# Patient Record
Sex: FEMALE | Race: BLACK OR AFRICAN AMERICAN | NOT HISPANIC OR LATINO | Employment: OTHER | URBAN - METROPOLITAN AREA
[De-identification: names, ages, dates, MRNs, and addresses within clinical notes are randomized per-mention and may not be internally consistent; named-entity substitution may affect disease eponyms.]

---

## 2017-04-01 ENCOUNTER — HISTORICAL (OUTPATIENT)
Dept: LAB | Facility: HOSPITAL | Age: 67
End: 2017-04-01

## 2017-07-03 ENCOUNTER — HISTORICAL (OUTPATIENT)
Dept: RADIOLOGY | Facility: HOSPITAL | Age: 67
End: 2017-07-03

## 2017-09-30 ENCOUNTER — HISTORICAL (OUTPATIENT)
Dept: LAB | Facility: HOSPITAL | Age: 67
End: 2017-09-30

## 2017-09-30 LAB
BUN SERPL-MCNC: 22 MG/DL (ref 7–18)
CALCIUM SERPL-MCNC: 9.8 MG/DL (ref 8.5–10.1)
CHLORIDE SERPL-SCNC: 104 MMOL/L (ref 98–107)
CO2 SERPL-SCNC: 30.2 MMOL/L (ref 21–32)
CREAT SERPL-MCNC: 1.04 MG/DL (ref 0.6–1.3)
EST. AVERAGE GLUCOSE BLD GHB EST-MCNC: 177 MG/DL
GLUCOSE SERPL-MCNC: 160 MG/DL (ref 74–106)
HBA1C MFR BLD: 7.8 % (ref 4.5–6.2)
POTASSIUM SERPL-SCNC: 4.1 MMOL/L (ref 3.5–5.1)
SODIUM SERPL-SCNC: 141 MMOL/L (ref 136–145)

## 2018-01-31 ENCOUNTER — HISTORICAL (OUTPATIENT)
Dept: LAB | Facility: HOSPITAL | Age: 68
End: 2018-01-31

## 2018-01-31 LAB
ALBUMIN SERPL-MCNC: 3.8 GM/DL (ref 3.4–5)
ALP SERPL-CCNC: 63 UNIT/L (ref 46–116)
ALT SERPL-CCNC: 24 UNIT/L (ref 12–78)
AST SERPL-CCNC: 13 UNIT/L (ref 15–37)
BILIRUB SERPL-MCNC: 0.7 MG/DL (ref 0.2–1)
BILIRUBIN DIRECT+TOT PNL SERPL-MCNC: 0.18 MG/DL (ref 0–0.2)
BILIRUBIN DIRECT+TOT PNL SERPL-MCNC: 0.53 MG/DL (ref 0–0.8)
BUN SERPL-MCNC: 12.1 MG/DL (ref 7–18)
CALCIUM SERPL-MCNC: 10 MG/DL (ref 8.5–10.1)
CHLORIDE SERPL-SCNC: 102 MMOL/L (ref 98–107)
CHOLEST SERPL-MCNC: 197 MG/DL (ref 0–200)
CHOLEST/HDLC SERPL: 3.2 {RATIO} (ref 0–4)
CO2 SERPL-SCNC: 32.9 MMOL/L (ref 21–32)
CREAT SERPL-MCNC: 0.84 MG/DL (ref 0.6–1.3)
ERYTHROCYTE [DISTWIDTH] IN BLOOD BY AUTOMATED COUNT: 14.6 % (ref 11.5–17)
EST. AVERAGE GLUCOSE BLD GHB EST-MCNC: 169 MG/DL
FT4I SERPL CALC-MCNC: 2.28
GLUCOSE SERPL-MCNC: 161 MG/DL (ref 74–106)
HBA1C MFR BLD: 7.5 % (ref 4.5–6.2)
HCT VFR BLD AUTO: 40 % (ref 37–47)
HDLC SERPL-MCNC: 61 MG/DL (ref 40–60)
HGB BLD-MCNC: 12.7 GM/DL (ref 12–16)
LDLC SERPL CALC-MCNC: 113 MG/DL (ref 0–129)
MCH RBC QN AUTO: 28.9 PG (ref 27–31)
MCHC RBC AUTO-ENTMCNC: 31.8 GM/DL (ref 33–36)
MCV RBC AUTO: 91.1 FL (ref 80–94)
PLATELET # BLD AUTO: 187 X10(3)/MCL (ref 130–400)
PMV BLD AUTO: 9.9 FL (ref 7.4–10.4)
POTASSIUM SERPL-SCNC: 4.1 MMOL/L (ref 3.5–5.1)
PROT SERPL-MCNC: 7.7 GM/DL (ref 6.4–8.2)
RBC # BLD AUTO: 4.39 X10(6)/MCL (ref 4.2–5.4)
SODIUM SERPL-SCNC: 140 MMOL/L (ref 136–145)
T3RU NFR SERPL: 35 % (ref 31–39)
T4 SERPL-MCNC: 6.5 MCG/DL (ref 4.7–13.3)
TRIGL SERPL-MCNC: 113 MG/DL
TSH SERPL-ACNC: 1.42 MIU/ML (ref 0.36–3.74)
VLDLC SERPL CALC-MCNC: 23 MG/DL
WBC # SPEC AUTO: 5.1 X10(3)/MCL (ref 4.5–11.5)

## 2018-05-14 ENCOUNTER — HISTORICAL (OUTPATIENT)
Dept: RADIOLOGY | Facility: HOSPITAL | Age: 68
End: 2018-05-14

## 2018-05-18 ENCOUNTER — HISTORICAL (OUTPATIENT)
Dept: LAB | Facility: HOSPITAL | Age: 68
End: 2018-05-18

## 2018-05-18 LAB
ABS NEUT (OLG): 2.3 X10(3)/MCL (ref 2.1–9.2)
ALBUMIN SERPL-MCNC: 3.5 GM/DL (ref 3.4–5)
ALP SERPL-CCNC: 112 UNIT/L (ref 46–116)
ALT SERPL-CCNC: 108 UNIT/L (ref 12–78)
AST SERPL-CCNC: 66 UNIT/L (ref 15–37)
BASOPHILS NFR BLD AUTO: 1 % (ref 0–2)
BILIRUB SERPL-MCNC: 0.5 MG/DL (ref 0.2–1)
BILIRUBIN DIRECT+TOT PNL SERPL-MCNC: 0.14 MG/DL (ref 0–0.2)
BILIRUBIN DIRECT+TOT PNL SERPL-MCNC: 0.36 MG/DL (ref 0–0.8)
BUN SERPL-MCNC: 15.3 MG/DL (ref 7–18)
CALCIUM SERPL-MCNC: 9.6 MG/DL (ref 8.5–10.1)
CHLORIDE SERPL-SCNC: 101 MMOL/L (ref 98–107)
CO2 SERPL-SCNC: 30.5 MMOL/L (ref 21–32)
CREAT SERPL-MCNC: 0.92 MG/DL (ref 0.6–1.3)
CREAT/UREA NIT SERPL: 17
EOSINOPHIL # BLD AUTO: 0.1 X10(3)/MCL
EOSINOPHIL NFR BLD AUTO: 1 %
ERYTHROCYTE [DISTWIDTH] IN BLOOD BY AUTOMATED COUNT: 15.1 % (ref 11.5–17)
GLUCOSE SERPL-MCNC: 193 MG/DL (ref 74–106)
HCT VFR BLD AUTO: 40.1 % (ref 37–47)
HGB BLD-MCNC: 13 GM/DL (ref 12–16)
LYMPHOCYTES # BLD AUTO: 1.9 X10(3)/MCL
LYMPHOCYTES NFR BLD AUTO: 41 % (ref 13–40)
MCH RBC QN AUTO: 29.3 PG (ref 27–31)
MCHC RBC AUTO-ENTMCNC: 32.3 GM/DL (ref 33–36)
MCV RBC AUTO: 90.4 FL (ref 80–94)
MONOCYTES # BLD AUTO: 0.4 X10(3)/MCL
MONOCYTES NFR BLD AUTO: 8 % (ref 2–11)
NEUTROPHILS # BLD AUTO: 2.3 X10(3)/MCL (ref 2.1–9.2)
NEUTROPHILS NFR BLD AUTO: 49 % (ref 47–80)
PLATELET # BLD AUTO: 180 X10(3)/MCL (ref 130–400)
PMV BLD AUTO: 10 FL (ref 7.4–10.4)
POTASSIUM SERPL-SCNC: 4.1 MMOL/L (ref 3.5–5.1)
PROT SERPL-MCNC: 7.5 GM/DL (ref 6.4–8.2)
RBC # BLD AUTO: 4.43 X10(6)/MCL (ref 4.2–5.4)
SODIUM SERPL-SCNC: 140 MMOL/L (ref 136–145)
WBC # SPEC AUTO: 4.6 X10(3)/MCL (ref 4.5–11.5)

## 2018-06-27 ENCOUNTER — HISTORICAL (OUTPATIENT)
Dept: RADIOLOGY | Facility: HOSPITAL | Age: 68
End: 2018-06-27

## 2018-10-03 ENCOUNTER — HISTORICAL (OUTPATIENT)
Dept: RADIOLOGY | Facility: HOSPITAL | Age: 68
End: 2018-10-03

## 2018-12-03 ENCOUNTER — HISTORICAL (OUTPATIENT)
Dept: LAB | Facility: HOSPITAL | Age: 68
End: 2018-12-03

## 2018-12-03 LAB
ABS NEUT (OLG): 2.66 X10(3)/MCL (ref 2.1–9.2)
ALBUMIN SERPL-MCNC: 3.7 GM/DL (ref 3.4–5)
ALP SERPL-CCNC: 68 UNIT/L (ref 46–116)
ALT SERPL-CCNC: 31 UNIT/L (ref 12–78)
AST SERPL-CCNC: 15 UNIT/L (ref 15–37)
BASOPHILS # BLD AUTO: 0 X10(3)/MCL (ref 0–0.2)
BASOPHILS NFR BLD AUTO: 0 %
BILIRUB SERPL-MCNC: 0.8 MG/DL (ref 0.2–1)
BILIRUBIN DIRECT+TOT PNL SERPL-MCNC: 0.19 MG/DL (ref 0–0.2)
BILIRUBIN DIRECT+TOT PNL SERPL-MCNC: 0.61 MG/DL (ref 0–0.8)
BUN SERPL-MCNC: 19.3 MG/DL (ref 7–18)
CALCIUM SERPL-MCNC: 10.4 MG/DL (ref 8.5–10.1)
CHLORIDE SERPL-SCNC: 99 MMOL/L (ref 98–107)
CHOLEST SERPL-MCNC: 234 MG/DL (ref 0–200)
CHOLEST/HDLC SERPL: 4.2 {RATIO} (ref 0–4)
CO2 SERPL-SCNC: 34.1 MMOL/L (ref 21–32)
CREAT SERPL-MCNC: 1.02 MG/DL (ref 0.6–1.3)
CREAT/UREA NIT SERPL: 19
DEPRECATED CALCIDIOL+CALCIFEROL SERPL-MC: 72.71 NG/ML (ref 30–80)
EOSINOPHIL # BLD AUTO: 0.1 X10(3)/MCL (ref 0–0.9)
EOSINOPHIL NFR BLD AUTO: 2 %
ERYTHROCYTE [DISTWIDTH] IN BLOOD BY AUTOMATED COUNT: 13.6 % (ref 11.5–17)
EST. AVERAGE GLUCOSE BLD GHB EST-MCNC: 197 MG/DL
FT4I SERPL CALC-MCNC: 1.73
GLUCOSE SERPL-MCNC: 194 MG/DL (ref 74–106)
HBA1C MFR BLD: 8.5 % (ref 4.5–6.2)
HCT VFR BLD AUTO: 41.1 % (ref 37–47)
HDLC SERPL-MCNC: 56 MG/DL (ref 40–60)
HGB BLD-MCNC: 12.8 GM/DL (ref 12–16)
LDLC SERPL CALC-MCNC: 128 MG/DL (ref 0–129)
LYMPHOCYTES # BLD AUTO: 1.8 X10(3)/MCL (ref 0.6–4.6)
LYMPHOCYTES NFR BLD AUTO: 37 %
MCH RBC QN AUTO: 29.3 PG (ref 27–31)
MCHC RBC AUTO-ENTMCNC: 31.1 GM/DL (ref 33–36)
MCV RBC AUTO: 94.1 FL (ref 80–94)
MONOCYTES # BLD AUTO: 0.3 X10(3)/MCL (ref 0.1–1.3)
MONOCYTES NFR BLD AUTO: 7 %
NEUTROPHILS # BLD AUTO: 2.66 X10(3)/MCL (ref 1.4–7.9)
NEUTROPHILS NFR BLD AUTO: 54 %
PLATELET # BLD AUTO: 199 X10(3)/MCL (ref 130–400)
PMV BLD AUTO: 10.9 FL (ref 9.4–12.4)
POTASSIUM SERPL-SCNC: 4.5 MMOL/L (ref 3.5–5.1)
PROT SERPL-MCNC: 7.6 GM/DL (ref 6.4–8.2)
RBC # BLD AUTO: 4.37 X10(6)/MCL (ref 4.2–5.4)
SODIUM SERPL-SCNC: 139 MMOL/L (ref 136–145)
T3RU NFR SERPL: 32 % (ref 31–39)
T4 SERPL-MCNC: 5.4 MCG/DL (ref 4.7–13.3)
TRIGL SERPL-MCNC: 251 MG/DL
TSH SERPL-ACNC: 1.73 MIU/ML (ref 0.36–3.74)
VLDLC SERPL CALC-MCNC: 50 MG/DL
WBC # SPEC AUTO: 4.9 X10(3)/MCL (ref 4.5–11.5)

## 2019-01-09 ENCOUNTER — HISTORICAL (OUTPATIENT)
Dept: RADIOLOGY | Facility: HOSPITAL | Age: 69
End: 2019-01-09

## 2019-05-24 ENCOUNTER — HISTORICAL (OUTPATIENT)
Dept: LAB | Facility: HOSPITAL | Age: 69
End: 2019-05-24

## 2019-05-24 LAB
BUN SERPL-MCNC: 21.4 MG/DL (ref 7–18)
CALCIUM SERPL-MCNC: 10 MG/DL (ref 8.5–10.1)
CHLORIDE SERPL-SCNC: 102 MMOL/L (ref 98–107)
CO2 SERPL-SCNC: 31.5 MMOL/L (ref 21–32)
CREAT SERPL-MCNC: 0.97 MG/DL (ref 0.6–1.3)
CREAT/UREA NIT SERPL: 22
EST. AVERAGE GLUCOSE BLD GHB EST-MCNC: 157 MG/DL
GLUCOSE SERPL-MCNC: 157 MG/DL (ref 74–106)
HBA1C MFR BLD: 7.1 % (ref 4.5–6.2)
POTASSIUM SERPL-SCNC: 4 MMOL/L (ref 3.5–5.1)
SODIUM SERPL-SCNC: 143 MMOL/L (ref 136–145)

## 2019-09-03 ENCOUNTER — HISTORICAL (OUTPATIENT)
Dept: LAB | Facility: HOSPITAL | Age: 69
End: 2019-09-03

## 2019-09-03 LAB
ABS NEUT (OLG): 3.1 X10(3)/MCL (ref 2.1–9.2)
BASOPHILS # BLD AUTO: 0 X10(3)/MCL (ref 0–0.2)
BASOPHILS NFR BLD AUTO: 0 %
BUN SERPL-MCNC: 35 MG/DL (ref 7–18)
CALCIUM SERPL-MCNC: 9.2 MG/DL (ref 8.5–10.1)
CHLORIDE SERPL-SCNC: 103 MMOL/L (ref 98–107)
CO2 SERPL-SCNC: 30.9 MMOL/L (ref 21–32)
CREAT SERPL-MCNC: 1.54 MG/DL (ref 0.6–1.3)
CREAT/UREA NIT SERPL: 23
EOSINOPHIL # BLD AUTO: 0.1 X10(3)/MCL (ref 0–0.9)
EOSINOPHIL NFR BLD AUTO: 1 %
ERYTHROCYTE [DISTWIDTH] IN BLOOD BY AUTOMATED COUNT: 14.9 % (ref 11.5–17)
EST. AVERAGE GLUCOSE BLD GHB EST-MCNC: 214 MG/DL
GLUCOSE SERPL-MCNC: 176 MG/DL (ref 74–106)
HBA1C MFR BLD: 9.1 % (ref 4.2–6.3)
HCT VFR BLD AUTO: 40.2 % (ref 37–47)
HGB BLD-MCNC: 12.1 GM/DL (ref 12–16)
LYMPHOCYTES # BLD AUTO: 2.1 X10(3)/MCL (ref 0.6–4.6)
LYMPHOCYTES NFR BLD AUTO: 37 %
MCH RBC QN AUTO: 27.9 PG (ref 27–31)
MCHC RBC AUTO-ENTMCNC: 30.1 GM/DL (ref 33–36)
MCV RBC AUTO: 92.6 FL (ref 80–94)
MONOCYTES # BLD AUTO: 0.4 X10(3)/MCL (ref 0.1–1.3)
MONOCYTES NFR BLD AUTO: 7 %
NEUTROPHILS # BLD AUTO: 3.1 X10(3)/MCL (ref 1.4–7.9)
NEUTROPHILS NFR BLD AUTO: 55 %
PLATELET # BLD AUTO: 212 X10(3)/MCL (ref 130–400)
PMV BLD AUTO: 10.5 FL (ref 9.4–12.4)
POTASSIUM SERPL-SCNC: 4.1 MMOL/L (ref 3.5–5.1)
RBC # BLD AUTO: 4.34 X10(6)/MCL (ref 4.2–5.4)
SODIUM SERPL-SCNC: 141 MMOL/L (ref 136–145)
WBC # SPEC AUTO: 5.6 X10(3)/MCL (ref 4.5–11.5)

## 2019-10-07 ENCOUNTER — HISTORICAL (OUTPATIENT)
Dept: RADIOLOGY | Facility: HOSPITAL | Age: 69
End: 2019-10-07

## 2019-11-25 ENCOUNTER — HISTORICAL (OUTPATIENT)
Dept: ADMINISTRATIVE | Facility: HOSPITAL | Age: 69
End: 2019-11-25

## 2019-11-25 LAB
ABS NEUT (OLG): 3.05 X10(3)/MCL (ref 2.1–9.2)
ALBUMIN SERPL-MCNC: 3.7 GM/DL (ref 3.4–5)
ALBUMIN/GLOB SERPL: 0.9 RATIO (ref 1.1–2)
ALP SERPL-CCNC: 56 UNIT/L (ref 45–117)
ALT SERPL-CCNC: 16 UNIT/L (ref 12–78)
AST SERPL-CCNC: 11 UNIT/L (ref 15–37)
BASOPHILS # BLD AUTO: 0 X10(3)/MCL (ref 0–0.2)
BASOPHILS NFR BLD AUTO: 0 %
BILIRUB SERPL-MCNC: 0.4 MG/DL (ref 0.2–1)
BILIRUBIN DIRECT+TOT PNL SERPL-MCNC: 0.1 MG/DL (ref 0–0.2)
BILIRUBIN DIRECT+TOT PNL SERPL-MCNC: 0.3 MG/DL
BUN SERPL-MCNC: 25 MG/DL (ref 7–18)
CALCIUM SERPL-MCNC: 9.9 MG/DL (ref 8.5–10.1)
CHLORIDE SERPL-SCNC: 106 MMOL/L (ref 98–107)
CO2 SERPL-SCNC: 31 MMOL/L (ref 21–32)
CREAT SERPL-MCNC: 1.3 MG/DL (ref 0.6–1.3)
CRP SERPL-MCNC: <0.3 MG/DL
EOSINOPHIL # BLD AUTO: 0.1 X10(3)/MCL (ref 0–0.9)
EOSINOPHIL NFR BLD AUTO: 2 %
ERYTHROCYTE [DISTWIDTH] IN BLOOD BY AUTOMATED COUNT: 15.7 % (ref 11.5–14.5)
ERYTHROCYTE [SEDIMENTATION RATE] IN BLOOD: 28 MM/HR (ref 0–20)
GLOBULIN SER-MCNC: 4 GM/ML (ref 2.3–3.5)
GLUCOSE SERPL-MCNC: 66 MG/DL (ref 74–106)
HCT VFR BLD AUTO: 38.6 % (ref 35–46)
HGB BLD-MCNC: 11.5 GM/DL (ref 12–16)
IMM GRANULOCYTES # BLD AUTO: 0.01 10*3/UL
IMM GRANULOCYTES NFR BLD AUTO: 0 %
LYMPHOCYTES # BLD AUTO: 2.8 X10(3)/MCL (ref 0.6–4.6)
LYMPHOCYTES NFR BLD AUTO: 43 %
MCH RBC QN AUTO: 28.2 PG (ref 26–34)
MCHC RBC AUTO-ENTMCNC: 29.8 GM/DL (ref 31–37)
MCV RBC AUTO: 94.6 FL (ref 80–100)
MONOCYTES # BLD AUTO: 0.5 X10(3)/MCL (ref 0.1–1.3)
MONOCYTES NFR BLD AUTO: 8 %
NEUTROPHILS # BLD AUTO: 3.05 X10(3)/MCL (ref 2.1–9.2)
NEUTROPHILS NFR BLD AUTO: 47 %
PLATELET # BLD AUTO: 220 X10(3)/MCL (ref 130–400)
PMV BLD AUTO: 10.5 FL (ref 7.4–10.4)
POTASSIUM SERPL-SCNC: 3.8 MMOL/L (ref 3.5–5.1)
PROT SERPL-MCNC: 7.7 GM/DL (ref 6.4–8.2)
RBC # BLD AUTO: 4.08 X10(6)/MCL (ref 4–5.2)
SODIUM SERPL-SCNC: 140 MMOL/L (ref 136–145)
WBC # SPEC AUTO: 6.5 X10(3)/MCL (ref 4.5–11)

## 2020-02-25 ENCOUNTER — HISTORICAL (OUTPATIENT)
Dept: LAB | Facility: HOSPITAL | Age: 70
End: 2020-02-25

## 2020-02-26 LAB
ABS NEUT (OLG): 2.22 X10(3)/MCL (ref 2.1–9.2)
BASOPHILS # BLD AUTO: 0 X10(3)/MCL (ref 0–0.2)
BASOPHILS NFR BLD AUTO: 0 %
BUN SERPL-MCNC: 23.4 MG/DL (ref 7–18)
CALCIUM SERPL-MCNC: 9.7 MG/DL (ref 8.5–10.1)
CHLORIDE SERPL-SCNC: 105 MMOL/L (ref 98–107)
CO2 SERPL-SCNC: 30 MMOL/L (ref 21–32)
CREAT SERPL-MCNC: 1.21 MG/DL (ref 0.6–1.3)
CREAT/UREA NIT SERPL: 19
EOSINOPHIL # BLD AUTO: 0.1 X10(3)/MCL (ref 0–0.9)
EOSINOPHIL NFR BLD AUTO: 2 %
ERYTHROCYTE [DISTWIDTH] IN BLOOD BY AUTOMATED COUNT: 15.4 % (ref 11.5–17)
EST. AVERAGE GLUCOSE BLD GHB EST-MCNC: 174 MG/DL
GLUCOSE SERPL-MCNC: 129 MG/DL (ref 74–106)
HBA1C MFR BLD: 7.7 % (ref 4.5–6.2)
HCT VFR BLD AUTO: 34.9 % (ref 37–47)
HGB BLD-MCNC: 10.4 GM/DL (ref 12–16)
LYMPHOCYTES # BLD AUTO: 1.9 X10(3)/MCL (ref 0.6–4.6)
LYMPHOCYTES NFR BLD AUTO: 41 %
MCH RBC QN AUTO: 26.4 PG (ref 27–31)
MCHC RBC AUTO-ENTMCNC: 29.8 GM/DL (ref 33–36)
MCV RBC AUTO: 88.6 FL (ref 80–94)
MONOCYTES # BLD AUTO: 0.4 X10(3)/MCL (ref 0.1–1.3)
MONOCYTES NFR BLD AUTO: 8 %
NEUTROPHILS # BLD AUTO: 2.22 X10(3)/MCL (ref 1.4–7.9)
NEUTROPHILS NFR BLD AUTO: 49 %
PLATELET # BLD AUTO: 241 X10(3)/MCL (ref 130–400)
PMV BLD AUTO: 11.4 FL (ref 9.4–12.4)
POTASSIUM SERPL-SCNC: 4.2 MMOL/L (ref 3.5–5.1)
RBC # BLD AUTO: 3.94 X10(6)/MCL (ref 4.2–5.4)
SODIUM SERPL-SCNC: 145 MMOL/L (ref 136–145)
WBC # SPEC AUTO: 4.5 X10(3)/MCL (ref 4.5–11.5)

## 2020-06-01 ENCOUNTER — HISTORICAL (OUTPATIENT)
Dept: LAB | Facility: HOSPITAL | Age: 70
End: 2020-06-01

## 2020-06-01 LAB
ABS NEUT (OLG): 4.28 X10(3)/MCL (ref 2.1–9.2)
ALBUMIN SERPL-MCNC: 3.7 GM/DL (ref 3.4–5)
ALP SERPL-CCNC: 59 UNIT/L (ref 46–116)
ALT SERPL-CCNC: 13 UNIT/L (ref 12–78)
AST SERPL-CCNC: 17 UNIT/L (ref 15–37)
BASOPHILS # BLD AUTO: 0 X10(3)/MCL (ref 0–0.2)
BASOPHILS NFR BLD AUTO: 0 %
BILIRUB SERPL-MCNC: 0.6 MG/DL (ref 0.2–1)
BILIRUBIN DIRECT+TOT PNL SERPL-MCNC: 0.12 MG/DL (ref 0–0.2)
BILIRUBIN DIRECT+TOT PNL SERPL-MCNC: 0.48 MG/DL (ref 0–0.8)
BUN SERPL-MCNC: 27.9 MG/DL (ref 7–18)
CALCIUM SERPL-MCNC: 10 MG/DL (ref 8.5–10.1)
CHLORIDE SERPL-SCNC: 106 MMOL/L (ref 98–107)
CHOLEST SERPL-MCNC: 226 MG/DL (ref 0–200)
CHOLEST/HDLC SERPL: 4.5 {RATIO} (ref 0–4)
CO2 SERPL-SCNC: 29.5 MMOL/L (ref 21–32)
CREAT SERPL-MCNC: 1.6 MG/DL (ref 0.6–1.3)
CREAT/UREA NIT SERPL: 17
DEPRECATED CALCIDIOL+CALCIFEROL SERPL-MC: 64.3 NG/ML (ref 6.6–49.9)
EOSINOPHIL # BLD AUTO: 0.1 X10(3)/MCL (ref 0–0.9)
EOSINOPHIL NFR BLD AUTO: 1 %
ERYTHROCYTE [DISTWIDTH] IN BLOOD BY AUTOMATED COUNT: 17.3 % (ref 11.5–17)
EST. AVERAGE GLUCOSE BLD GHB EST-MCNC: 166 MG/DL
FT4I SERPL CALC-MCNC: 2.18
GLUCOSE SERPL-MCNC: 158 MG/DL (ref 74–106)
HBA1C MFR BLD: 7.4 % (ref 4.5–6.2)
HCT VFR BLD AUTO: 35.1 % (ref 37–47)
HDLC SERPL-MCNC: 50 MG/DL (ref 40–60)
HGB BLD-MCNC: 10.6 GM/DL (ref 12–16)
LDLC SERPL CALC-MCNC: 150 MG/DL (ref 0–129)
LYMPHOCYTES # BLD AUTO: 1.9 X10(3)/MCL (ref 0.6–4.6)
LYMPHOCYTES NFR BLD AUTO: 28 %
MCH RBC QN AUTO: 24.9 PG (ref 27–31)
MCHC RBC AUTO-ENTMCNC: 30.2 GM/DL (ref 33–36)
MCV RBC AUTO: 82.6 FL (ref 80–94)
MONOCYTES # BLD AUTO: 0.5 X10(3)/MCL (ref 0.1–1.3)
MONOCYTES NFR BLD AUTO: 7 %
NEUTROPHILS # BLD AUTO: 4.28 X10(3)/MCL (ref 1.4–7.9)
NEUTROPHILS NFR BLD AUTO: 63 %
PLATELET # BLD AUTO: 256 X10(3)/MCL (ref 130–400)
PMV BLD AUTO: 10.2 FL (ref 9.4–12.4)
POTASSIUM SERPL-SCNC: 4.4 MMOL/L (ref 3.5–5.1)
PROT SERPL-MCNC: 7.8 GM/DL (ref 6.4–8.2)
RBC # BLD AUTO: 4.25 X10(6)/MCL (ref 4.2–5.4)
SODIUM SERPL-SCNC: 144 MMOL/L (ref 136–145)
T3RU NFR SERPL: 34 % (ref 31–39)
T4 SERPL-MCNC: 6.4 MCG/DL (ref 4.7–13.3)
TRIGL SERPL-MCNC: 132 MG/DL
TSH SERPL-ACNC: 2 MIU/ML (ref 0.36–3.74)
VLDLC SERPL CALC-MCNC: 26 MG/DL
WBC # SPEC AUTO: 6.8 X10(3)/MCL (ref 4.5–11.5)

## 2021-01-05 ENCOUNTER — HISTORICAL (OUTPATIENT)
Dept: ADMINISTRATIVE | Facility: HOSPITAL | Age: 71
End: 2021-01-05

## 2021-01-05 ENCOUNTER — HISTORICAL (OUTPATIENT)
Dept: LAB | Facility: HOSPITAL | Age: 71
End: 2021-01-05

## 2021-01-05 LAB
ALBUMIN SERPL-MCNC: 3.4 GM/DL (ref 3.4–4.8)
ALP SERPL-CCNC: 61 UNIT/L (ref 40–150)
ALT SERPL-CCNC: 14 UNIT/L (ref 0–55)
AST SERPL-CCNC: 16 UNIT/L (ref 5–34)
BILIRUB SERPL-MCNC: 0.7 MG/DL
BILIRUBIN DIRECT+TOT PNL SERPL-MCNC: 0.2 MG/DL (ref 0–0.5)
BILIRUBIN DIRECT+TOT PNL SERPL-MCNC: 0.5 MG/DL (ref 0–0.8)
BNP BLD-MCNC: 73 PG/ML (ref 0–125)
BUN SERPL-MCNC: 16.3 MG/DL (ref 9.8–20.1)
CALCIUM SERPL-MCNC: 9.5 MG/DL (ref 8.4–10.2)
CHLORIDE SERPL-SCNC: 100 MMOL/L (ref 98–107)
CHOLEST SERPL-MCNC: 226 MG/DL
CHOLEST/HDLC SERPL: 5 {RATIO} (ref 0–5)
CO2 SERPL-SCNC: 32 MMOL/L (ref 23–31)
CREAT SERPL-MCNC: 1.04 MG/DL (ref 0.55–1.02)
CREAT/UREA NIT SERPL: 16
DEPRECATED CALCIDIOL+CALCIFEROL SERPL-MC: 59.2 NG/ML (ref 30–80)
ERYTHROCYTE [DISTWIDTH] IN BLOOD BY AUTOMATED COUNT: 17.7 % (ref 11.5–17)
EST. AVERAGE GLUCOSE BLD GHB EST-MCNC: 180 MG/DL
GLUCOSE SERPL-MCNC: 154 MG/DL (ref 82–115)
HBA1C MFR BLD: 7.9 %
HCT VFR BLD AUTO: 34.9 % (ref 37–47)
HDLC SERPL-MCNC: 49 MG/DL (ref 35–60)
HGB BLD-MCNC: 10.2 GM/DL (ref 12–16)
LDLC SERPL CALC-MCNC: 148 MG/DL (ref 50–140)
MCH RBC QN AUTO: 24.4 PG (ref 27–31)
MCHC RBC AUTO-ENTMCNC: 29.2 GM/DL (ref 33–36)
MCV RBC AUTO: 83.5 FL (ref 80–94)
PLATELET # BLD AUTO: 237 X10(3)/MCL (ref 130–400)
PMV BLD AUTO: 10.4 FL (ref 9.4–12.4)
POTASSIUM SERPL-SCNC: 4.1 MMOL/L (ref 3.5–5.1)
PROT SERPL-MCNC: 6.5 GM/DL (ref 5.8–7.6)
RBC # BLD AUTO: 4.18 X10(6)/MCL (ref 4.2–5.4)
SODIUM SERPL-SCNC: 140 MMOL/L (ref 136–145)
TRIGL SERPL-MCNC: 143 MG/DL (ref 37–140)
TSH SERPL-ACNC: 1.53 UIU/ML (ref 0.35–4.94)
VLDLC SERPL CALC-MCNC: 29 MG/DL
WBC # SPEC AUTO: 5 X10(3)/MCL (ref 4.5–11.5)

## 2021-05-06 ENCOUNTER — HISTORICAL (OUTPATIENT)
Dept: RADIOLOGY | Facility: HOSPITAL | Age: 71
End: 2021-05-06

## 2021-07-22 ENCOUNTER — HISTORICAL (OUTPATIENT)
Dept: LAB | Facility: HOSPITAL | Age: 71
End: 2021-07-22

## 2021-07-22 LAB
ABS NEUT (OLG): 3.81 X10(3)/MCL (ref 2.1–9.2)
ALBUMIN SERPL-MCNC: 3.5 GM/DL (ref 3.4–4.8)
ALBUMIN/GLOB SERPL: 0.9 RATIO (ref 1.1–2)
ALP SERPL-CCNC: 67 UNIT/L (ref 40–150)
ALT SERPL-CCNC: 10 UNIT/L (ref 0–55)
ANTINUCLEAR ANTIBODY SCREEN (OHS): NEGATIVE
AST SERPL-CCNC: 11 UNIT/L (ref 5–34)
BASOPHILS # BLD AUTO: 0 X10(3)/MCL (ref 0–0.2)
BASOPHILS NFR BLD AUTO: 0 %
BILIRUB SERPL-MCNC: 0.8 MG/DL
BILIRUBIN DIRECT+TOT PNL SERPL-MCNC: 0.2 MG/DL (ref 0–0.5)
BILIRUBIN DIRECT+TOT PNL SERPL-MCNC: 0.6 MG/DL (ref 0–0.8)
BUN SERPL-MCNC: 18.1 MG/DL (ref 9.8–20.1)
CALCIUM SERPL-MCNC: 9.5 MG/DL (ref 8.4–10.2)
CENTROMERE PROTEIN ANTIBODY (OHS): NEGATIVE
CHLORIDE SERPL-SCNC: 99 MMOL/L (ref 98–107)
CO2 SERPL-SCNC: 23 MMOL/L (ref 23–31)
CREAT SERPL-MCNC: 1.48 MG/DL (ref 0.55–1.02)
CRP SERPL HS-MCNC: 3 MG/L
DEPRECATED CALCIDIOL+CALCIFEROL SERPL-MC: 50.2 NG/ML (ref 30–80)
DSDNA ANTIBODY (OHS): NEGATIVE
EOSINOPHIL # BLD AUTO: 0 X10(3)/MCL (ref 0–0.9)
EOSINOPHIL NFR BLD AUTO: 1 %
ERYTHROCYTE [DISTWIDTH] IN BLOOD BY AUTOMATED COUNT: 17.1 % (ref 11.5–17)
ERYTHROCYTE [SEDIMENTATION RATE] IN BLOOD: 35 MM/HR (ref 0–20)
GLOBULIN SER-MCNC: 3.8 GM/DL (ref 2.4–3.5)
GLUCOSE SERPL-MCNC: 293 MG/DL (ref 82–115)
HBV SURFACE AG SERPL QL IA: NONREACTIVE
HCT VFR BLD AUTO: 39.1 % (ref 37–47)
HCV AB SERPL QL IA: NONREACTIVE
HGB BLD-MCNC: 11.5 GM/DL (ref 12–16)
IMM GRANULOCYTES # BLD AUTO: 0.01 % (ref 0–0.02)
IMM GRANULOCYTES NFR BLD AUTO: 0.2 % (ref 0–0.43)
JO-1 ANTIBODY (OHS): NEGATIVE
LYMPHOCYTES # BLD AUTO: 2.3 X10(3)/MCL (ref 0.6–4.6)
LYMPHOCYTES NFR BLD AUTO: 36 %
MCH RBC QN AUTO: 24.9 PG (ref 27–31)
MCHC RBC AUTO-ENTMCNC: 29.4 GM/DL (ref 33–36)
MCV RBC AUTO: 84.8 FL (ref 80–94)
MONOCYTES # BLD AUTO: 0.4 X10(3)/MCL (ref 0.1–1.3)
MONOCYTES NFR BLD AUTO: 6 %
NEUTROPHILS # BLD AUTO: 3.81 X10(3)/MCL (ref 1.4–7.9)
NEUTROPHILS NFR BLD AUTO: 58 %
PLATELET # BLD AUTO: 228 X10(3)/MCL (ref 130–400)
PMV BLD AUTO: 10.8 FL (ref 9.4–12.4)
POTASSIUM SERPL-SCNC: 3.5 MMOL/L (ref 3.5–5.1)
PROT SERPL-MCNC: 7.3 GM/DL (ref 5.8–7.6)
RBC # BLD AUTO: 4.61 X10(6)/MCL (ref 4.2–5.4)
RHEUMATOID FACT SERPL-ACNC: <13 IU/ML
RNP70 ANTIBODY (OHS): NEGATIVE
SCLERODERMA (SCL-70S) ANTIBODY (OHS): NEGATIVE
SMITH DP IGG (OHS): NEGATIVE
SODIUM SERPL-SCNC: 135 MMOL/L (ref 136–145)
SSA(RO) ANTIBODY (OHS): NEGATIVE
SSB(LA) ANTIBODY (OHS): NEGATIVE
TSH SERPL-ACNC: 1.55 UIU/ML (ref 0.35–4.94)
U1RNP ANTIBODY (OHS): NEGATIVE
WBC # SPEC AUTO: 6.6 X10(3)/MCL (ref 4.5–11.5)

## 2021-10-02 ENCOUNTER — HISTORICAL (OUTPATIENT)
Dept: LAB | Facility: HOSPITAL | Age: 71
End: 2021-10-02

## 2021-10-02 LAB
ABS NEUT (OLG): 2.35 X10(3)/MCL (ref 2.1–9.2)
ALBUMIN SERPL-MCNC: 3.6 GM/DL (ref 3.4–4.8)
ALP SERPL-CCNC: 65 UNIT/L (ref 40–150)
ALT SERPL-CCNC: 15 UNIT/L (ref 0–55)
AST SERPL-CCNC: 16 UNIT/L (ref 5–34)
BASOPHILS # BLD AUTO: 0 X10(3)/MCL (ref 0–0.2)
BASOPHILS NFR BLD AUTO: 0 %
BILIRUB SERPL-MCNC: 0.6 MG/DL
BILIRUBIN DIRECT+TOT PNL SERPL-MCNC: 0.2 MG/DL (ref 0–0.5)
BILIRUBIN DIRECT+TOT PNL SERPL-MCNC: 0.4 MG/DL (ref 0–0.8)
BUN SERPL-MCNC: 23.1 MG/DL (ref 9.8–20.1)
CALCIUM SERPL-MCNC: 9.9 MG/DL (ref 8.4–10.2)
CHLORIDE SERPL-SCNC: 103 MMOL/L (ref 98–107)
CHOLEST SERPL-MCNC: 211 MG/DL
CHOLEST/HDLC SERPL: 5 {RATIO} (ref 0–5)
CO2 SERPL-SCNC: 30 MMOL/L (ref 23–31)
CREAT SERPL-MCNC: 1.58 MG/DL (ref 0.55–1.02)
CREAT/UREA NIT SERPL: 15
EOSINOPHIL # BLD AUTO: 0.1 X10(3)/MCL (ref 0–0.9)
EOSINOPHIL NFR BLD AUTO: 3 %
ERYTHROCYTE [DISTWIDTH] IN BLOOD BY AUTOMATED COUNT: 16.6 % (ref 11.5–17)
EST. AVERAGE GLUCOSE BLD GHB EST-MCNC: 200.1 MG/DL
FT4I SERPL CALC-MCNC: 2.48 (ref 2.6–3.6)
GLUCOSE SERPL-MCNC: 141 MG/DL (ref 82–115)
HBA1C MFR BLD: 8.6 %
HCT VFR BLD AUTO: 40.9 % (ref 37–47)
HDLC SERPL-MCNC: 46 MG/DL (ref 35–60)
HGB BLD-MCNC: 12 GM/DL (ref 12–16)
IMM GRANULOCYTES # BLD AUTO: 0.01 % (ref 0–0.02)
IMM GRANULOCYTES NFR BLD AUTO: 0.2 % (ref 0–0.43)
LDLC SERPL CALC-MCNC: 137 MG/DL (ref 50–140)
LYMPHOCYTES # BLD AUTO: 1.9 X10(3)/MCL (ref 0.6–4.6)
LYMPHOCYTES NFR BLD AUTO: 40 %
MCH RBC QN AUTO: 26.3 PG (ref 27–31)
MCHC RBC AUTO-ENTMCNC: 29.3 GM/DL (ref 33–36)
MCV RBC AUTO: 89.5 FL (ref 80–94)
MONOCYTES # BLD AUTO: 0.3 X10(3)/MCL (ref 0.1–1.3)
MONOCYTES NFR BLD AUTO: 7 %
NEUTROPHILS # BLD AUTO: 2.35 X10(3)/MCL (ref 1.4–7.9)
NEUTROPHILS NFR BLD AUTO: 50 %
PLATELET # BLD AUTO: 193 X10(3)/MCL (ref 130–400)
PMV BLD AUTO: 11.2 FL (ref 9.4–12.4)
POTASSIUM SERPL-SCNC: 4.2 MMOL/L (ref 3.5–5.1)
PROT SERPL-MCNC: 7.5 GM/DL (ref 5.8–7.6)
RBC # BLD AUTO: 4.57 X10(6)/MCL (ref 4.2–5.4)
SODIUM SERPL-SCNC: 142 MMOL/L (ref 136–145)
T3RU NFR SERPL: 39.6 % (ref 31–39)
T4 SERPL-MCNC: 6.27 UG/DL (ref 4.87–11.72)
TRIGL SERPL-MCNC: 140 MG/DL (ref 37–140)
TSH SERPL-ACNC: 1.37 UIU/ML (ref 0.35–4.94)
VLDLC SERPL CALC-MCNC: 28 MG/DL
WBC # SPEC AUTO: 4.7 X10(3)/MCL (ref 4.5–11.5)

## 2022-02-16 ENCOUNTER — HISTORICAL (OUTPATIENT)
Dept: LAB | Facility: HOSPITAL | Age: 72
End: 2022-02-16

## 2022-02-16 LAB
ABS NEUT (OLG): 2.4 (ref 2.1–9.2)
ALBUMIN SERPL-MCNC: 3.5 G/DL (ref 3.4–4.8)
ALP SERPL-CCNC: 50 U/L (ref 40–150)
ALT SERPL-CCNC: 16 U/L (ref 0–55)
AST SERPL-CCNC: 15 U/L (ref 5–34)
BASOPHILS # BLD AUTO: 0 10*3/UL (ref 0–0.2)
BASOPHILS NFR BLD AUTO: 1 %
BILIRUB SERPL-MCNC: 0.9 MG/DL
BILIRUBIN DIRECT+TOT PNL SERPL-MCNC: 0.3 (ref 0–0.5)
BILIRUBIN DIRECT+TOT PNL SERPL-MCNC: 0.6 (ref 0–0.8)
BUN SERPL-MCNC: 21.8 MG/DL (ref 9.8–20.1)
CALCIUM SERPL-MCNC: 10.4 MG/DL (ref 8.7–10.5)
CHLORIDE SERPL-SCNC: 101 MMOL/L (ref 98–107)
CHOLEST SERPL-MCNC: 199 MG/DL
CHOLEST/HDLC SERPL: 5 {RATIO} (ref 0–5)
CO2 SERPL-SCNC: 30 MMOL/L (ref 23–31)
CREAT SERPL-MCNC: 1.14 MG/DL (ref 0.55–1.02)
CREAT/UREA NIT SERPL: 19
DEPRECATED CALCIDIOL+CALCIFEROL SERPL-MC: 70.1 NG/ML (ref 30–80)
EOSINOPHIL # BLD AUTO: 0.1 10*3/UL (ref 0–0.9)
EOSINOPHIL NFR BLD AUTO: 2 %
ERYTHROCYTE [DISTWIDTH] IN BLOOD BY AUTOMATED COUNT: 16.1 % (ref 11.5–17)
EST. AVERAGE GLUCOSE BLD GHB EST-MCNC: 165.7 MG/DL
FT4I SERPL CALC-MCNC: 2.91 (ref 2.6–3.6)
GLUCOSE SERPL-MCNC: 172 MG/DL (ref 82–115)
HBA1C MFR BLD: 7.4 %
HCT VFR BLD AUTO: 40.2 % (ref 37–47)
HDLC SERPL-MCNC: 39 MG/DL (ref 35–60)
HEMOLYSIS INTERF INDEX SERPL-ACNC: <0
HEMOLYSIS INTERF INDEX SERPL-ACNC: <0
HGB BLD-MCNC: 11.9 G/DL (ref 12–16)
ICTERIC INTERF INDEX SERPL-ACNC: 1
ICTERIC INTERF INDEX SERPL-ACNC: 1
IMM GRANULOCYTES # BLD AUTO: 0.01 10*3/UL (ref 0–0.02)
IMM GRANULOCYTES NFR BLD AUTO: 0.2 % (ref 0–0.43)
LDLC SERPL CALC-MCNC: 124 MG/DL (ref 50–140)
LIPEMIC INTERF INDEX SERPL-ACNC: 1
LIPEMIC INTERF INDEX SERPL-ACNC: 1
LYMPHOCYTES # BLD AUTO: 1.3 10*3/UL (ref 0.6–4.6)
LYMPHOCYTES NFR BLD AUTO: 31 %
MANUAL DIFF? (OHS): NO
MCH RBC QN AUTO: 28.1 PG (ref 27–31)
MCHC RBC AUTO-ENTMCNC: 29.6 G/DL (ref 33–36)
MCV RBC AUTO: 95 FL (ref 80–94)
MONOCYTES # BLD AUTO: 0.4 10*3/UL (ref 0.1–1.3)
MONOCYTES NFR BLD AUTO: 10 %
NEUTROPHILS # BLD AUTO: 2.4 10*3/UL (ref 1.4–7.9)
NEUTROPHILS NFR BLD AUTO: 56 %
PLATELET # BLD AUTO: 432 10*3/UL (ref 130–400)
PMV BLD AUTO: 9.7 FL (ref 9.4–12.4)
POTASSIUM SERPL-SCNC: 4.5 MMOL/L (ref 3.5–5.1)
PROT SERPL-MCNC: 7.3 G/DL (ref 5.8–7.6)
RBC # BLD AUTO: 4.23 10*6/UL (ref 4.2–5.4)
SODIUM SERPL-SCNC: 141 MMOL/L (ref 136–145)
T3RU NFR SERPL: 40.59 % (ref 31–39)
T4 SERPL-MCNC: 7.16 UG/DL (ref 4.87–11.72)
TRIGL SERPL-MCNC: 178 MG/DL (ref 37–140)
TSH SERPL-ACNC: 1.17 M[IU]/L (ref 0.35–4.94)
VLDLC SERPL CALC-MCNC: 36 MG/DL
WBC # SPEC AUTO: 4.3 10*3/UL (ref 4.5–11.5)

## 2022-04-11 ENCOUNTER — HISTORICAL (OUTPATIENT)
Dept: ADMINISTRATIVE | Facility: HOSPITAL | Age: 72
End: 2022-04-11
Payer: MEDICARE

## 2022-04-28 VITALS
OXYGEN SATURATION: 96 % | HEIGHT: 64 IN | BODY MASS INDEX: 41.5 KG/M2 | WEIGHT: 243.06 LBS | DIASTOLIC BLOOD PRESSURE: 81 MMHG | SYSTOLIC BLOOD PRESSURE: 123 MMHG

## 2022-05-12 DIAGNOSIS — Z12.31 SCREENING MAMMOGRAM FOR BREAST CANCER: Primary | ICD-10-CM

## 2022-05-17 ENCOUNTER — HOSPITAL ENCOUNTER (OUTPATIENT)
Dept: RADIOLOGY | Facility: HOSPITAL | Age: 72
Discharge: HOME | End: 2022-05-17
Attending: FAMILY MEDICINE
Payer: MEDICARE

## 2022-05-17 DIAGNOSIS — Z12.31 SCREENING MAMMOGRAM FOR BREAST CANCER: ICD-10-CM

## 2022-05-17 PROCEDURE — 77067 MAMMO DIGITAL SCREENING BILAT WITH TOMO: ICD-10-PCS | Mod: 26,,, | Performed by: RADIOLOGY

## 2022-05-17 PROCEDURE — 77067 SCR MAMMO BI INCL CAD: CPT | Mod: TC

## 2022-05-17 PROCEDURE — 77067 SCR MAMMO BI INCL CAD: CPT | Mod: 26,,, | Performed by: RADIOLOGY

## 2022-05-17 PROCEDURE — 77063 BREAST TOMOSYNTHESIS BI: CPT | Mod: 26,,, | Performed by: RADIOLOGY

## 2022-05-17 PROCEDURE — 77063 MAMMO DIGITAL SCREENING BILAT WITH TOMO: ICD-10-PCS | Mod: 26,,, | Performed by: RADIOLOGY

## 2022-09-09 ENCOUNTER — LAB VISIT (OUTPATIENT)
Dept: LAB | Facility: HOSPITAL | Age: 72
End: 2022-09-09
Attending: FAMILY MEDICINE
Payer: MEDICARE

## 2022-09-09 DIAGNOSIS — E11.69 DIABETES MELLITUS ASSOCIATED WITH HORMONAL ETIOLOGY: Primary | ICD-10-CM

## 2022-09-09 LAB
ANION GAP SERPL CALC-SCNC: 9 MEQ/L
BUN SERPL-MCNC: 31.9 MG/DL (ref 9.8–20.1)
CALCIUM SERPL-MCNC: 9.7 MG/DL (ref 8.4–10.2)
CHLORIDE SERPL-SCNC: 108 MMOL/L (ref 98–107)
CO2 SERPL-SCNC: 27 MMOL/L (ref 23–31)
CREAT SERPL-MCNC: 2.3 MG/DL (ref 0.55–1.02)
CREAT/UREA NIT SERPL: 14
EST. AVERAGE GLUCOSE BLD GHB EST-MCNC: 122.6 MG/DL
GFR SERPLBLD CREATININE-BSD FMLA CKD-EPI: 22 MLS/MIN/1.73/M2
GLUCOSE SERPL-MCNC: 91 MG/DL (ref 82–115)
HBA1C MFR BLD: 5.9 %
POTASSIUM SERPL-SCNC: 5.1 MMOL/L (ref 3.5–5.1)
SODIUM SERPL-SCNC: 144 MMOL/L (ref 136–145)

## 2022-09-09 PROCEDURE — 80048 BASIC METABOLIC PNL TOTAL CA: CPT

## 2022-09-09 PROCEDURE — 36415 COLL VENOUS BLD VENIPUNCTURE: CPT

## 2022-09-09 PROCEDURE — 83036 HEMOGLOBIN GLYCOSYLATED A1C: CPT

## 2022-09-20 ENCOUNTER — HOSPITAL ENCOUNTER (OUTPATIENT)
Dept: RADIOLOGY | Facility: HOSPITAL | Age: 72
Discharge: HOME | End: 2022-09-20
Attending: FAMILY MEDICINE
Payer: MEDICARE

## 2022-09-20 DIAGNOSIS — Z78.0 MENOPAUSE: ICD-10-CM

## 2022-09-20 PROCEDURE — 77080 DXA BONE DENSITY AXIAL: CPT | Mod: TC

## 2022-10-06 ENCOUNTER — LAB VISIT (OUTPATIENT)
Dept: LAB | Facility: HOSPITAL | Age: 72
End: 2022-10-06
Attending: FAMILY MEDICINE
Payer: MEDICARE

## 2022-10-06 DIAGNOSIS — R03.0 ELEVATED BLOOD PRESSURE READING WITHOUT DIAGNOSIS OF HYPERTENSION: Primary | ICD-10-CM

## 2022-10-06 DIAGNOSIS — E11.69 DIABETES MELLITUS ASSOCIATED WITH HORMONAL ETIOLOGY: ICD-10-CM

## 2022-10-06 LAB
ANION GAP SERPL CALC-SCNC: 11 MEQ/L
BUN SERPL-MCNC: 28.6 MG/DL (ref 9.8–20.1)
CALCIUM SERPL-MCNC: 10.2 MG/DL (ref 8.4–10.2)
CHLORIDE SERPL-SCNC: 103 MMOL/L (ref 98–107)
CO2 SERPL-SCNC: 29 MMOL/L (ref 23–31)
CREAT SERPL-MCNC: 1.27 MG/DL (ref 0.55–1.02)
CREAT/UREA NIT SERPL: 23
GFR SERPLBLD CREATININE-BSD FMLA CKD-EPI: 45 MLS/MIN/1.73/M2
GLUCOSE SERPL-MCNC: 102 MG/DL (ref 82–115)
POTASSIUM SERPL-SCNC: 4.5 MMOL/L (ref 3.5–5.1)
SODIUM SERPL-SCNC: 143 MMOL/L (ref 136–145)

## 2022-10-06 PROCEDURE — 80048 BASIC METABOLIC PNL TOTAL CA: CPT

## 2022-10-06 PROCEDURE — 36415 COLL VENOUS BLD VENIPUNCTURE: CPT

## 2022-11-08 ENCOUNTER — LAB VISIT (OUTPATIENT)
Dept: LAB | Facility: HOSPITAL | Age: 72
End: 2022-11-08
Attending: FAMILY MEDICINE
Payer: MEDICARE

## 2022-11-08 DIAGNOSIS — R79.89 ELEVATED SERUM CREATININE: Primary | ICD-10-CM

## 2022-11-08 DIAGNOSIS — I10 ESSENTIAL HYPERTENSION, MALIGNANT: ICD-10-CM

## 2022-11-08 LAB
ANION GAP SERPL CALC-SCNC: 8 MEQ/L
BUN SERPL-MCNC: 27 MG/DL (ref 9.8–20.1)
CALCIUM SERPL-MCNC: 9.6 MG/DL (ref 8.4–10.2)
CHLORIDE SERPL-SCNC: 102 MMOL/L (ref 98–107)
CO2 SERPL-SCNC: 33 MMOL/L (ref 23–31)
CREAT SERPL-MCNC: 1.41 MG/DL (ref 0.55–1.02)
CREAT/UREA NIT SERPL: 19
GFR SERPLBLD CREATININE-BSD FMLA CKD-EPI: 39 MLS/MIN/1.73/M2
GLUCOSE SERPL-MCNC: 100 MG/DL (ref 82–115)
POTASSIUM SERPL-SCNC: 4.3 MMOL/L (ref 3.5–5.1)
SODIUM SERPL-SCNC: 143 MMOL/L (ref 136–145)

## 2022-11-08 PROCEDURE — 36415 COLL VENOUS BLD VENIPUNCTURE: CPT

## 2022-11-08 PROCEDURE — 80048 BASIC METABOLIC PNL TOTAL CA: CPT

## 2023-02-21 ENCOUNTER — LAB VISIT (OUTPATIENT)
Dept: LAB | Facility: HOSPITAL | Age: 73
End: 2023-02-21
Attending: FAMILY MEDICINE
Payer: MEDICARE

## 2023-02-21 DIAGNOSIS — Z00.00 ROUTINE GENERAL MEDICAL EXAMINATION AT A HEALTH CARE FACILITY: ICD-10-CM

## 2023-02-21 DIAGNOSIS — E55.9 AVITAMINOSIS D: ICD-10-CM

## 2023-02-21 DIAGNOSIS — E78.5 HYPERLIPIDEMIA, UNSPECIFIED HYPERLIPIDEMIA TYPE: ICD-10-CM

## 2023-02-21 DIAGNOSIS — E11.69 DIABETES MELLITUS ASSOCIATED WITH HORMONAL ETIOLOGY: Primary | ICD-10-CM

## 2023-02-21 LAB
ALBUMIN SERPL-MCNC: 3.8 G/DL (ref 3.4–4.8)
ALP SERPL-CCNC: 57 UNIT/L (ref 40–150)
ALT SERPL-CCNC: 15 UNIT/L (ref 0–55)
ANION GAP SERPL CALC-SCNC: 11 MEQ/L
AST SERPL-CCNC: 14 UNIT/L (ref 5–34)
BASOPHILS # BLD AUTO: 0.02 X10(3)/MCL (ref 0–0.2)
BASOPHILS NFR BLD AUTO: 0.4 %
BILIRUBIN DIRECT+TOT PNL SERPL-MCNC: 0.3 MG/DL (ref 0–?)
BILIRUBIN DIRECT+TOT PNL SERPL-MCNC: 0.4 MG/DL (ref 0–0.8)
BILIRUBIN DIRECT+TOT PNL SERPL-MCNC: 0.7 MG/DL
BUN SERPL-MCNC: 30.9 MG/DL (ref 9.8–20.1)
CALCIUM SERPL-MCNC: 10 MG/DL (ref 8.4–10.2)
CHLORIDE SERPL-SCNC: 101 MMOL/L (ref 98–107)
CHOLEST SERPL-MCNC: 251 MG/DL
CHOLEST/HDLC SERPL: 5 {RATIO} (ref 0–5)
CO2 SERPL-SCNC: 29 MMOL/L (ref 23–31)
CREAT SERPL-MCNC: 1.23 MG/DL (ref 0.55–1.02)
CREAT/UREA NIT SERPL: 25
DEPRECATED CALCIDIOL+CALCIFEROL SERPL-MC: 72.6 NG/ML (ref 30–80)
EOSINOPHIL # BLD AUTO: 0.06 X10(3)/MCL (ref 0–0.9)
EOSINOPHIL NFR BLD AUTO: 1.3 %
ERYTHROCYTE [DISTWIDTH] IN BLOOD BY AUTOMATED COUNT: 15.1 % (ref 11.5–17)
EST. AVERAGE GLUCOSE BLD GHB EST-MCNC: 137 MG/DL
FT4I SERPL CALC-MCNC: 2.36 (ref 2.6–3.6)
GFR SERPLBLD CREATININE-BSD FMLA CKD-EPI: 46 MLS/MIN/1.73/M2
GLUCOSE SERPL-MCNC: 108 MG/DL (ref 82–115)
HBA1C MFR BLD: 6.4 %
HCT VFR BLD AUTO: 39.6 % (ref 37–47)
HDLC SERPL-MCNC: 53 MG/DL (ref 35–60)
HGB BLD-MCNC: 12.1 G/DL (ref 12–16)
IMM GRANULOCYTES # BLD AUTO: 0 X10(3)/MCL (ref 0–0.04)
IMM GRANULOCYTES NFR BLD AUTO: 0 %
LDLC SERPL CALC-MCNC: 172 MG/DL (ref 50–140)
LYMPHOCYTES # BLD AUTO: 1.98 X10(3)/MCL (ref 0.6–4.6)
LYMPHOCYTES NFR BLD AUTO: 41.9 %
MCH RBC QN AUTO: 28 PG
MCHC RBC AUTO-ENTMCNC: 30.6 G/DL (ref 33–36)
MCV RBC AUTO: 91.7 FL (ref 80–94)
MONOCYTES # BLD AUTO: 0.32 X10(3)/MCL (ref 0.1–1.3)
MONOCYTES NFR BLD AUTO: 6.8 %
NEUTROPHILS # BLD AUTO: 2.34 X10(3)/MCL (ref 2.1–9.2)
NEUTROPHILS NFR BLD AUTO: 49.6 %
PLATELET # BLD AUTO: 205 X10(3)/MCL (ref 130–400)
PMV BLD AUTO: 10.3 FL (ref 7.4–10.4)
POTASSIUM SERPL-SCNC: 4 MMOL/L (ref 3.5–5.1)
PROT SERPL-MCNC: 7.1 GM/DL (ref 5.8–7.6)
RBC # BLD AUTO: 4.32 X10(6)/MCL (ref 4.2–5.4)
SODIUM SERPL-SCNC: 141 MMOL/L (ref 136–145)
T3RU NFR SERPL: 42.04 % (ref 31–39)
T4 SERPL-MCNC: 5.62 UG/DL (ref 4.87–11.72)
TRIGL SERPL-MCNC: 130 MG/DL (ref 37–140)
TSH SERPL-ACNC: 1.3 UIU/ML (ref 0.35–4.94)
VLDLC SERPL CALC-MCNC: 26 MG/DL
WBC # SPEC AUTO: 4.7 X10(3)/MCL (ref 4.5–11.5)

## 2023-02-21 PROCEDURE — 84479 ASSAY OF THYROID (T3 OR T4): CPT

## 2023-02-21 PROCEDURE — 80076 HEPATIC FUNCTION PANEL: CPT

## 2023-02-21 PROCEDURE — 85025 COMPLETE CBC W/AUTO DIFF WBC: CPT

## 2023-02-21 PROCEDURE — 84443 ASSAY THYROID STIM HORMONE: CPT

## 2023-02-21 PROCEDURE — 36415 COLL VENOUS BLD VENIPUNCTURE: CPT

## 2023-02-21 PROCEDURE — 80061 LIPID PANEL: CPT

## 2023-02-21 PROCEDURE — 82306 VITAMIN D 25 HYDROXY: CPT

## 2023-02-21 PROCEDURE — 80048 BASIC METABOLIC PNL TOTAL CA: CPT

## 2023-02-21 PROCEDURE — 84436 ASSAY OF TOTAL THYROXINE: CPT

## 2023-02-21 PROCEDURE — 83036 HEMOGLOBIN GLYCOSYLATED A1C: CPT

## 2023-07-14 ENCOUNTER — HOSPITAL ENCOUNTER (OUTPATIENT)
Dept: RADIOLOGY | Facility: HOSPITAL | Age: 73
Discharge: HOME | End: 2023-07-14
Attending: FAMILY MEDICINE
Payer: MEDICARE

## 2023-07-14 DIAGNOSIS — Z12.31 SCREENING MAMMOGRAM, ENCOUNTER FOR: ICD-10-CM

## 2023-07-14 PROCEDURE — 77063 MAMMO DIGITAL SCREENING BILAT WITH TOMO: ICD-10-PCS | Mod: 26,,, | Performed by: RADIOLOGY

## 2023-07-14 PROCEDURE — 77063 BREAST TOMOSYNTHESIS BI: CPT | Mod: 26,,, | Performed by: RADIOLOGY

## 2023-07-14 PROCEDURE — 77067 SCR MAMMO BI INCL CAD: CPT | Mod: 26,,, | Performed by: RADIOLOGY

## 2023-07-14 PROCEDURE — 77067 SCR MAMMO BI INCL CAD: CPT | Mod: TC

## 2023-07-14 PROCEDURE — 77067 MAMMO DIGITAL SCREENING BILAT WITH TOMO: ICD-10-PCS | Mod: 26,,, | Performed by: RADIOLOGY

## 2023-08-14 ENCOUNTER — LAB VISIT (OUTPATIENT)
Dept: LAB | Facility: HOSPITAL | Age: 73
End: 2023-08-14
Attending: FAMILY MEDICINE
Payer: MEDICARE

## 2023-08-14 DIAGNOSIS — E11.69 DIABETES MELLITUS ASSOCIATED WITH HORMONAL ETIOLOGY: ICD-10-CM

## 2023-08-14 DIAGNOSIS — I10 ESSENTIAL HYPERTENSION, MALIGNANT: Primary | ICD-10-CM

## 2023-08-14 LAB
ANION GAP SERPL CALC-SCNC: 9 MEQ/L
BUN SERPL-MCNC: 25 MG/DL (ref 9.8–20.1)
CALCIUM SERPL-MCNC: 9.8 MG/DL (ref 8.4–10.2)
CHLORIDE SERPL-SCNC: 105 MMOL/L (ref 98–107)
CO2 SERPL-SCNC: 29 MMOL/L (ref 23–31)
CREAT SERPL-MCNC: 1.41 MG/DL (ref 0.55–1.02)
CREAT/UREA NIT SERPL: 18
GFR SERPLBLD CREATININE-BSD FMLA CKD-EPI: 39 MLS/MIN/1.73/M2
GLUCOSE SERPL-MCNC: 98 MG/DL (ref 82–115)
POTASSIUM SERPL-SCNC: 5.1 MMOL/L (ref 3.5–5.1)
SODIUM SERPL-SCNC: 143 MMOL/L (ref 136–145)

## 2023-08-14 PROCEDURE — 36415 COLL VENOUS BLD VENIPUNCTURE: CPT

## 2023-08-14 PROCEDURE — 80048 BASIC METABOLIC PNL TOTAL CA: CPT

## 2023-09-07 ENCOUNTER — LAB VISIT (OUTPATIENT)
Dept: LAB | Facility: HOSPITAL | Age: 73
End: 2023-09-07
Attending: FAMILY MEDICINE
Payer: MEDICARE

## 2023-09-07 DIAGNOSIS — L40.3 SAPHO SYNDROME: ICD-10-CM

## 2023-09-07 DIAGNOSIS — M85.80 SAPHO SYNDROME: ICD-10-CM

## 2023-09-07 DIAGNOSIS — I10 ESSENTIAL HYPERTENSION, MALIGNANT: ICD-10-CM

## 2023-09-07 DIAGNOSIS — E11.42 DIABETIC POLYNEUROPATHY: ICD-10-CM

## 2023-09-07 DIAGNOSIS — M86.9 SAPHO SYNDROME: ICD-10-CM

## 2023-09-07 DIAGNOSIS — Z79.899 ENCOUNTER FOR LONG-TERM (CURRENT) USE OF OTHER MEDICATIONS: ICD-10-CM

## 2023-09-07 DIAGNOSIS — E11.69 DIABETES MELLITUS ASSOCIATED WITH HORMONAL ETIOLOGY: Primary | ICD-10-CM

## 2023-09-07 DIAGNOSIS — E55.9 AVITAMINOSIS D: ICD-10-CM

## 2023-09-07 DIAGNOSIS — R79.89 HYPOURICEMIA: ICD-10-CM

## 2023-09-07 DIAGNOSIS — K21.9 GASTROESOPHAGEAL REFLUX DISEASE, UNSPECIFIED WHETHER ESOPHAGITIS PRESENT: ICD-10-CM

## 2023-09-07 DIAGNOSIS — L70.9 SAPHO SYNDROME: ICD-10-CM

## 2023-09-07 DIAGNOSIS — M35.9 DIFFUSE DISEASE OF CONNECTIVE TISSUE: ICD-10-CM

## 2023-09-07 DIAGNOSIS — M65.9 SAPHO SYNDROME: ICD-10-CM

## 2023-09-07 DIAGNOSIS — E78.5 HYPERLIPIDEMIA, UNSPECIFIED HYPERLIPIDEMIA TYPE: ICD-10-CM

## 2023-09-07 LAB
ANION GAP SERPL CALC-SCNC: 8 MEQ/L
BUN SERPL-MCNC: 21.6 MG/DL (ref 9.8–20.1)
CALCIUM SERPL-MCNC: 9.7 MG/DL (ref 8.4–10.2)
CHLORIDE SERPL-SCNC: 105 MMOL/L (ref 98–107)
CO2 SERPL-SCNC: 30 MMOL/L (ref 23–31)
CREAT SERPL-MCNC: 1.31 MG/DL (ref 0.55–1.02)
CREAT/UREA NIT SERPL: 16
EST. AVERAGE GLUCOSE BLD GHB EST-MCNC: 125.5 MG/DL
GFR SERPLBLD CREATININE-BSD FMLA CKD-EPI: 43 MLS/MIN/1.73/M2
GLUCOSE SERPL-MCNC: 126 MG/DL (ref 82–115)
HBA1C MFR BLD: 6 %
POTASSIUM SERPL-SCNC: 4.2 MMOL/L (ref 3.5–5.1)
SODIUM SERPL-SCNC: 143 MMOL/L (ref 136–145)

## 2023-09-07 PROCEDURE — 83036 HEMOGLOBIN GLYCOSYLATED A1C: CPT

## 2023-09-07 PROCEDURE — 80048 BASIC METABOLIC PNL TOTAL CA: CPT

## 2023-09-07 PROCEDURE — 36415 COLL VENOUS BLD VENIPUNCTURE: CPT

## 2023-11-27 DIAGNOSIS — R10.30 PAIN IN THE GROIN: Primary | ICD-10-CM

## 2023-11-29 ENCOUNTER — HOSPITAL ENCOUNTER (OUTPATIENT)
Dept: RADIOLOGY | Facility: HOSPITAL | Age: 73
Discharge: HOME | End: 2023-11-29
Payer: MEDICARE

## 2023-11-29 DIAGNOSIS — R10.30 PAIN IN THE GROIN: ICD-10-CM

## 2023-11-29 PROCEDURE — 76700 US EXAM ABDOM COMPLETE: CPT | Mod: TC

## 2023-11-29 PROCEDURE — 76830 TRANSVAGINAL US NON-OB: CPT | Mod: TC

## 2024-01-31 ENCOUNTER — OFFICE VISIT (OUTPATIENT)
Dept: INTERNAL MEDICINE | Facility: CLINIC | Age: 74
End: 2024-01-31
Payer: MEDICARE

## 2024-01-31 VITALS
BODY MASS INDEX: 43.23 KG/M2 | DIASTOLIC BLOOD PRESSURE: 81 MMHG | HEART RATE: 80 BPM | TEMPERATURE: 98 F | SYSTOLIC BLOOD PRESSURE: 116 MMHG | WEIGHT: 244 LBS | RESPIRATION RATE: 18 BRPM | HEIGHT: 63 IN

## 2024-01-31 DIAGNOSIS — M35.9 CONNECTIVE TISSUE DISORDER: ICD-10-CM

## 2024-01-31 DIAGNOSIS — M79.7 FIBROMYALGIA: Primary | ICD-10-CM

## 2024-01-31 DIAGNOSIS — E11.22 TYPE 2 DIABETES MELLITUS WITH STAGE 3B CHRONIC KIDNEY DISEASE, WITHOUT LONG-TERM CURRENT USE OF INSULIN: ICD-10-CM

## 2024-01-31 DIAGNOSIS — M17.0 PRIMARY OSTEOARTHRITIS OF BOTH KNEES: ICD-10-CM

## 2024-01-31 DIAGNOSIS — E11.22 CONTROLLED TYPE 2 DIABETES MELLITUS WITH STAGE 3 CHRONIC KIDNEY DISEASE, WITHOUT LONG-TERM CURRENT USE OF INSULIN: ICD-10-CM

## 2024-01-31 DIAGNOSIS — N18.30 CONTROLLED TYPE 2 DIABETES MELLITUS WITH STAGE 3 CHRONIC KIDNEY DISEASE, WITHOUT LONG-TERM CURRENT USE OF INSULIN: ICD-10-CM

## 2024-01-31 DIAGNOSIS — E66.9 OBESITY, UNSPECIFIED CLASSIFICATION, UNSPECIFIED OBESITY TYPE, UNSPECIFIED WHETHER SERIOUS COMORBIDITY PRESENT: ICD-10-CM

## 2024-01-31 DIAGNOSIS — M85.80 OSTEOPENIA, UNSPECIFIED LOCATION: ICD-10-CM

## 2024-01-31 DIAGNOSIS — R79.89 ELEVATED SERUM CREATININE: ICD-10-CM

## 2024-01-31 DIAGNOSIS — N18.32 STAGE 3B CHRONIC KIDNEY DISEASE: ICD-10-CM

## 2024-01-31 DIAGNOSIS — E78.2 MIXED HYPERLIPIDEMIA: ICD-10-CM

## 2024-01-31 DIAGNOSIS — N18.32 TYPE 2 DIABETES MELLITUS WITH STAGE 3B CHRONIC KIDNEY DISEASE, WITHOUT LONG-TERM CURRENT USE OF INSULIN: ICD-10-CM

## 2024-01-31 DIAGNOSIS — E66.01 CLASS 3 OBESITY: ICD-10-CM

## 2024-01-31 PROBLEM — I10 HYPERTENSION: Status: ACTIVE | Noted: 2024-01-31

## 2024-01-31 PROBLEM — I10 HYPERTENSION: Status: RESOLVED | Noted: 2024-01-31 | Resolved: 2024-01-31

## 2024-01-31 PROBLEM — N18.9 CKD (CHRONIC KIDNEY DISEASE): Status: ACTIVE | Noted: 2024-01-31

## 2024-01-31 PROBLEM — E78.5 HYPERLIPIDEMIA: Status: ACTIVE | Noted: 2024-01-31

## 2024-01-31 PROBLEM — E66.813 CLASS 3 OBESITY: Status: ACTIVE | Noted: 2024-01-31

## 2024-01-31 PROBLEM — E11.29 CONTROLLED TYPE 2 DIABETES MELLITUS WITH KIDNEY COMPLICATION, WITHOUT LONG-TERM CURRENT USE OF INSULIN: Status: ACTIVE | Noted: 2024-01-31

## 2024-01-31 PROCEDURE — 1126F AMNT PAIN NOTED NONE PRSNT: CPT | Mod: CPTII,,, | Performed by: NURSE PRACTITIONER

## 2024-01-31 PROCEDURE — 99215 OFFICE O/P EST HI 40 MIN: CPT | Mod: PBBFAC | Performed by: NURSE PRACTITIONER

## 2024-01-31 PROCEDURE — 99417 PROLNG OP E/M EACH 15 MIN: CPT | Mod: S$PBB,,, | Performed by: NURSE PRACTITIONER

## 2024-01-31 PROCEDURE — 3288F FALL RISK ASSESSMENT DOCD: CPT | Mod: CPTII,,, | Performed by: NURSE PRACTITIONER

## 2024-01-31 PROCEDURE — 1101F PT FALLS ASSESS-DOCD LE1/YR: CPT | Mod: CPTII,,, | Performed by: NURSE PRACTITIONER

## 2024-01-31 PROCEDURE — 3074F SYST BP LT 130 MM HG: CPT | Mod: CPTII,,, | Performed by: NURSE PRACTITIONER

## 2024-01-31 PROCEDURE — 99215 OFFICE O/P EST HI 40 MIN: CPT | Mod: S$PBB,,, | Performed by: NURSE PRACTITIONER

## 2024-01-31 PROCEDURE — 1159F MED LIST DOCD IN RCRD: CPT | Mod: CPTII,,, | Performed by: NURSE PRACTITIONER

## 2024-01-31 PROCEDURE — 3079F DIAST BP 80-89 MM HG: CPT | Mod: CPTII,,, | Performed by: NURSE PRACTITIONER

## 2024-01-31 RX ORDER — DICLOFENAC SODIUM 10 MG/G
4 GEL TOPICAL 4 TIMES DAILY PRN
Qty: 450 G | Refills: 4 | Status: SHIPPED | OUTPATIENT
Start: 2024-01-31

## 2024-01-31 RX ORDER — PRAVASTATIN SODIUM 40 MG/1
TABLET ORAL
Qty: 90 TABLET | Refills: 1 | Status: SHIPPED | OUTPATIENT
Start: 2024-01-31 | End: 2024-06-05 | Stop reason: SDUPTHER

## 2024-01-31 RX ORDER — BLOOD SUGAR DIAGNOSTIC
STRIP MISCELLANEOUS
COMMUNITY
Start: 2024-01-24 | End: 2024-03-15 | Stop reason: SDUPTHER

## 2024-01-31 RX ORDER — CLOBETASOL PROPIONATE 0.5 MG/G
CREAM TOPICAL
COMMUNITY
Start: 2023-11-07

## 2024-01-31 RX ORDER — DICLOFENAC SODIUM AND MISOPROSTOL 50; 200 MG/1; UG/1
TABLET, DELAYED RELEASE ORAL
COMMUNITY
Start: 2023-10-16 | End: 2024-01-31

## 2024-01-31 RX ORDER — SPIRONOLACTONE 25 MG/1
TABLET ORAL
Qty: 90 TABLET | Refills: 1 | Status: SHIPPED | OUTPATIENT
Start: 2024-01-31 | End: 2024-06-05 | Stop reason: SDUPTHER

## 2024-01-31 RX ORDER — DICLOFENAC SODIUM 10 MG/G
4 GEL TOPICAL
COMMUNITY
Start: 2021-07-22 | End: 2024-01-31 | Stop reason: SDUPTHER

## 2024-01-31 RX ORDER — DAPAGLIFLOZIN 5 MG/1
5 TABLET, FILM COATED ORAL DAILY
Qty: 90 TABLET | Refills: 1 | Status: SHIPPED | OUTPATIENT
Start: 2024-01-31 | End: 2024-06-05 | Stop reason: SDUPTHER

## 2024-01-31 RX ORDER — PIOGLITAZONEHYDROCHLORIDE 15 MG/1
TABLET ORAL
COMMUNITY
Start: 2023-11-07 | End: 2024-01-31 | Stop reason: SDUPTHER

## 2024-01-31 RX ORDER — SPIRONOLACTONE 25 MG/1
TABLET ORAL
COMMUNITY
Start: 2023-09-29 | End: 2024-01-31 | Stop reason: SDUPTHER

## 2024-01-31 RX ORDER — GLIPIZIDE 5 MG/1
TABLET ORAL
Qty: 90 TABLET | Refills: 1 | Status: SHIPPED | OUTPATIENT
Start: 2024-01-31 | End: 2024-06-05 | Stop reason: SDUPTHER

## 2024-01-31 RX ORDER — ERGOCALCIFEROL 1.25 MG/1
50000 CAPSULE ORAL
COMMUNITY
Start: 2023-12-11

## 2024-01-31 RX ORDER — DESONIDE 0.5 MG/G
CREAM TOPICAL
COMMUNITY
Start: 2023-10-31

## 2024-01-31 RX ORDER — GLIPIZIDE 5 MG/1
TABLET ORAL
COMMUNITY
Start: 2023-11-07 | End: 2024-01-31 | Stop reason: SDUPTHER

## 2024-01-31 RX ORDER — LANCETS
EACH MISCELLANEOUS
COMMUNITY
Start: 2024-01-24 | End: 2024-04-22

## 2024-01-31 RX ORDER — PRAVASTATIN SODIUM 40 MG/1
TABLET ORAL
COMMUNITY
Start: 2023-11-07 | End: 2024-01-31 | Stop reason: SDUPTHER

## 2024-01-31 RX ORDER — PIOGLITAZONEHYDROCHLORIDE 15 MG/1
TABLET ORAL
Qty: 90 TABLET | Refills: 1 | Status: SHIPPED | OUTPATIENT
Start: 2024-01-31 | End: 2024-06-05 | Stop reason: SDUPTHER

## 2024-01-31 RX ORDER — METFORMIN HYDROCHLORIDE 1000 MG/1
1000 TABLET ORAL
COMMUNITY
Start: 2021-07-22 | End: 2024-01-31

## 2024-01-31 NOTE — PROGRESS NOTES
"   Patient ID: [id number]     Chief Complaint: Establish Care        HPI:     HPI      Fabiola Mejia is a 73 y.o. female here today to establish care. Pt has hx Fibromyalgia, Connective tissue disorder, Bilat knee arthritis, Diabetes, HTN, HLD, Osteopenia, Elevated creatinine, CKD. Pt was previously followed by Dr Robby Laird but requesting to be seen here for PCP. Pt followed by  for Rheum.       Immunizations:   There is no immunization history on file for this patient.     ----------------------------  CKD (chronic kidney disease) stage 3, GFR 30-59 ml/min  Connective tissue disorder  Degenerative arthritis of knee, bilateral  Diabetes mellitus due to underlying condition with stage 3 chronic   kidney disease, without long-term current use of insulin, unspecified   whether stage 3a or 3b CKD  Fibromyalgia  HLD (hyperlipidemia)  HTN (hypertension)  Osteopenia     Past Surgical History:   Procedure Laterality Date    TONSILLECTOMY         Review of patient's allergies indicates:   Allergen Reactions    Codeine Other (See Comments)    Gabapentin     Hydroxychloroquine      Other Reaction(s): Loose bowel movement    Methocarbamol      Other Reaction(s): makes her sleepy and feel "funny"    Acetaminophen-codeine Other (See Comments)    Tramadol Rash       Current Outpatient Medications   Medication Instructions    clobetasoL (TEMOVATE) 0.05 % cream     CONTOUR NEXT TEST STRIPS Strp USE TO TEST BLOOD SUGAR DAILY    dapagliflozin propanediol (FARXIGA) 5 mg, Oral, Daily    desonide (DESOWEN) 0.05 % cream     diclofenac sodium (VOLTAREN) 4 g, Topical (Top), 4 times daily PRN    ergocalciferol (ERGOCALCIFEROL) 50,000 Units, Oral, Every 7 days    glipiZIDE (GLUCOTROL) 5 MG tablet Take 1 tab po daily.    MICROLET LANCET Misc USE TO TEST BLOOD SUGAR ONCE DAILY    pioglitazone (ACTOS) 15 MG tablet Take 1 tab po daily.    pravastatin (PRAVACHOL) 40 MG tablet Take 1 tab po Q hs.    spironolactone (ALDACTONE) 25 " MG tablet Take 1 tab po daily.       Social History     Socioeconomic History    Marital status:    Tobacco Use    Smoking status: Never    Smokeless tobacco: Never   Substance and Sexual Activity    Alcohol use: Never    Drug use: Never    Sexual activity: Not Currently     Social Determinants of Health     Financial Resource Strain: Low Risk  (1/31/2024)    Overall Financial Resource Strain (CARDIA)     Difficulty of Paying Living Expenses: Not hard at all   Food Insecurity: No Food Insecurity (1/31/2024)    Hunger Vital Sign     Worried About Running Out of Food in the Last Year: Never true     Ran Out of Food in the Last Year: Never true   Transportation Needs: No Transportation Needs (1/31/2024)    PRAPARE - Transportation     Lack of Transportation (Medical): No     Lack of Transportation (Non-Medical): No   Physical Activity: Inactive (1/31/2024)    Exercise Vital Sign     Days of Exercise per Week: 0 days     Minutes of Exercise per Session: 0 min   Stress: No Stress Concern Present (1/31/2024)    [place] [employer] - Occupational Stress Questionnaire     Feeling of Stress : Not at all   Social Connections: Moderately Isolated (1/31/2024)    Social Connection and Isolation Panel [NHANES]     Frequency of Communication with Friends and Family: More than three times a week     Frequency of Social Gatherings with Friends and Family: More than three times a week     Attends [employer] Services: More than 4 times per year     Active Member of Clubs or Organizations: No     Attends Club or Organization Meetings: Never     Marital Status:    Housing Stability: Low Risk  (1/31/2024)    Housing Stability Vital Sign     Unable to Pay for Housing in the Last Year: No     Number of Places Lived in the Last Year: 1     Unstable Housing in the Last Year: No        Family History   Problem Relation Age of Onset    Hypertension Mother     Diabetes Mother     Heart  "disease Mother     No Known Problems Father         Patient Care Team:  Mariama Rios FNP as PCP - General (Family Medicine)     Subjective:     Review of Systems     See HPI for details    Constitutional: Denies Change in appetite. Denies Chills. Denies Fever. Denies Night sweats.  Eye: Denies Blurred vision. Denies Discharge. Denies Eye pain.  ENT: Denies Decreased hearing. Denies Sore throat. Denies Swollen glands.  Respiratory: Denies Cough. Denies Shortness of breath. Denies Shortness of breath with exertion. Denies Wheezing.  Cardiovascular: DeniesChest pain at rest. Denies Chest pain with exertion. Denies Irregular heartbeat. Denies Palpitations. Denies Edema.  Gastrointestinal: Denies Abdominal pain. DeniesDiarrhea. Denies Nausea. Denies Vomiting. Denies Hematemesis or Hematochezia.  Genitourinary: Denies Dysuria. Denies Urinary frequency. Denies Urinary urgency. Denies Blood in urine.  Endocrine: Denies Cold intolerance. Denies Excessive thirst. Denies Heat intolerance. Denies Weight loss. Denies Weight gain.  Musculoskeletal: Admits Painful joints. Denies Weakness.  Integumentary: Denies Rash. Denies Itching. Denies Dry skin.  Neurologic: Denies Dizziness. Denies Fainting. Denies Headache.  Psychiatric: Denies Depression. Denies Anxiety. Denies Suicidal/Homicidal ideations.    All Other ROS: Negative except as stated in HPI.       Objective:     Visit Vitals  /81 (BP Location: Right arm, Patient Position: Sitting, BP Method: Large (Automatic))   Pulse 80   Temp 97.7 °F (36.5 °C) (Oral)   Resp 18   Ht 5' 3" (1.6 m)   Wt 110.7 kg (244 lb)   BMI 43.22 kg/m²       Physical Exam    General: Alert and oriented, No acute distress.  Head: Normocephalic, Atraumatic.  Eye: Pupils are equal, round and reactive to light, Extraocular movements are intact, Sclera non-icteric.  Ears/Nose/Throat: Normal, Mucosa moist,Clear.  Neck/Thyroid: Supple, Non-tender, No carotid bruit, No lymphadenopathy, No JVD, Full " range of motion.  Respiratory: Clear to auscultation bilaterally; No wheezes, rales or rhonchi,Non-labored respirations, Symmetrical chest wall expansion.  Cardiovascular: Regular rate and rhythm, S1/S2 normal, No murmurs, rubs or gallops.  Gastrointestinal: Soft, Non-tender, Non-distended, Normal bowel sounds, No palpable organomegaly.  Musculoskeletal: Normal range of motion.  Integumentary: Warm, Dry, Intact, No suspicious lesions or rashes.  Extremities: No clubbing, cyanosis or edema  Neurologic: No focal deficits, Cranial Nerves II-XII are grossly intact, Motor strength normal upper and lower extremities, Sensory exam intact.  Psychiatric: Normal interaction, Coherent speech, Euthymic mood, Appropriate affect       Labs Reviewed:     Chemistry:  Lab Results   Component Value Date     09/07/2023    K 4.2 09/07/2023    CHLORIDE 105 09/07/2023    BUN 21.6 (H) 09/07/2023    CREATININE 1.31 (H) 09/07/2023    EGFRNORACEVR 43 09/07/2023    GLUCOSE 126 (H) 09/07/2023    CALCIUM 9.7 09/07/2023    ALKPHOS 57 02/21/2023    LABPROT 7.1 02/21/2023    ALBUMIN 3.8 02/21/2023    BILIDIR 0.3 02/21/2023    IBILI 0.40 02/21/2023    AST 14 02/21/2023    ALT 15 02/21/2023    TWUHPAIZ17IP 72.6 02/21/2023        Lab Results   Component Value Date    HGBA1C 6.0 09/07/2023        Hematology:  Lab Results   Component Value Date    WBC 4.7 02/21/2023    HGB 12.1 02/21/2023    HCT 39.6 02/21/2023     02/21/2023       Lipid Panel:  Lab Results   Component Value Date    CHOL 251 (H) 02/21/2023    HDL 53 02/21/2023    .00 (H) 02/21/2023    TRIG 130 02/21/2023    TOTALCHOLEST 5 02/21/2023        Urine:  Lab Results   Component Value Date    APPEARANCEUA Clear 02/24/2019    PROTEINUA Negative 02/24/2019    LEUKOCYTESUR 250 (A) 02/24/2019    RBCUA 0-2 02/24/2019    WBCUA 11-25 (A) 02/24/2019    BACTERIA None Seen 02/24/2019    SQEPUA  (A) 02/24/2019    HYALINECASTS 0-2 (A) 02/24/2019        Assessment:       ICD-10-CM  ICD-9-CM   1. Fibromyalgia  M79.7 729.1   2. Connective tissue disorder  M35.9 710.9   3. Primary osteoarthritis of both knees  M17.0 715.16   4. Mixed hyperlipidemia  E78.2 272.2   5. Osteopenia, unspecified location  M85.80 733.90   6. Elevated serum creatinine  R79.89 790.99   7. Stage 3b chronic kidney disease  N18.32 585.3   8. Controlled type 2 diabetes mellitus with stage 3 chronic kidney disease, without long-term current use of insulin  E11.22 250.40    N18.30 585.3   9. Obesity, unspecified classification, unspecified obesity type, unspecified whether serious comorbidity present  E66.9 278.00   10. Class 3 obesity  E66.01 278.01   11. Type 2 diabetes mellitus with stage 3b chronic kidney disease, without long-term current use of insulin  E11.22 250.40    N18.32 585.3   12. Type 2 diabetes mellitus with stage 3b chronic kidney disease, without long-term current use of insulin  E11.22 250.40    N18.32 585.3        Plan:     1. Fibromyalgia  Pt was seeing her PCP in past for management. Refer to  Rheum cl for further eval and mgmt.     2. Connective tissue disorder  Pt was seeing her PCP in past for management, was seen by Iliana in 2021. Refer  Rheum cl for further eval and mgmt.     3. Primary osteoarthritis of both knees  Encouraged Tylenol arthritis OTC as prescribed per package prn pain. Refilled Diclofenac gel apply to affected area QID prn pain.     4. Mixed hyperlipidemia  Low fat diet and exercise.     5 Osteopenia, unspecified location  Bone Density done 9-20-22 showing:  DXA Bone Density Spine And Hip  Order: [id number]  Status: Final result       Visible to patient: No (inaccessible in Patient Portal)       Next appt: None       Dx: Menopause    0 Result Notes       1 HM Topic  Details    Reading Physician Reading Date Result Priority   Lester Bragg MD  [phone] 9/20/2022 Routine     Narrative & Impression  EXAMINATION:  DEXA BONE DENSITY SPINE HIP     CLINICAL HISTORY:  Asymptomatic  menopausal state     TECHNIQUE:  DXA scanning of the lumbar spine and hips.     COMPARISON:  No relevant comparison studies available.     FINDINGS:  Mean bone mineral density within the lumbar spine is 1.265 g/sq cm with a T score of 0.6.     Bone mineral density within the left femoral neck is 0.819 g/sq cm with a T score of -1.6.     Bone mineral density within the right femoral neck is 0.841 g/sq cm with a T score of -1.4.     Based on the left femoral density, the FRAX 10 year probability of a major osteoporotic fracture is 4.4 %. For a hip fracture, the 10 year probability is 0.7 %.     Impression:     Osteopenia in the femoral necks.        Electronically signed by: Lester Bragg  Date:                                            09/20/2022  Time:                                           11:56           Exam Ended: 09/20/22 11:17 CDT Last Resulted: 09/20/22 11:56 CDT           Encouraged Calcium with Vit D 600 mg 1 tab po BID OTC. Encouraged wt bearing exercise such as walking daily. Next DEXA due 09-20-24. Vit D level in 1 month.    6. Elevated serum creatinine  Creatinine 1.31. Encouraged low protein low salt diet. Maintain good BP and CBG levels to prevent worsening of levels.     7. Stage 3b chronic kidney disease  GFR 43. Encouraged low protein low salt diet. Maintain good BP and CBG levels to prevent worsening of levels. Informed to avoid NSAIDs. Pt was taking Arthrotec for pain. Informed to D/C Arthrotec due to CKD. Refilled Diclofenac gel apply to affected area QID prn pain. Informed she can alternate Tylenol Arthritis OTC as prescribed per package prn pain.     8. Controlled type 2 diabetes mellitus with stage 3 chronic kidney disease, without long-term current use of insulin  A1c 6.0 ADA diet and exercise. Cont Glipzide, pioglitazone. Pt currently on Metformin however Creatinine level has been elevated. Informed to stop Metformin. Will RX Farxiga 5 mg 1 tab po daily. Drink plenty of water.     9.  Obesity, unspecified classification, unspecified obesity type, unspecified whether serious comorbidity present   BMI 43. Low fat diet and exercise. Education provided.      Total Time spent with patient regarding patient care/medication mgmt/medication adjustment 99 minutes. I called pt's pharmacy coverage to determine mail order processing for all meds.       Follow up in about 1 month (around 2/29/2024) for with labs 1 week prior to appt. . In addition to their scheduled follow up, the patient has also been instructed to follow up on as needed basis.     Future Appointments   Date Time Provider Department Center   2/24/2024 10:30 AM SATURDAY PROVIDER, [facility] INTERNAL MEDICINE RESIDENTS [facility] AMILCAR Anthony   3/5/2024 10:00 AM [facility]ios FNP [facility]        [facility] [place]

## 2024-02-21 ENCOUNTER — TELEPHONE (OUTPATIENT)
Dept: ORTHOPEDICS | Facility: CLINIC | Age: 74
End: 2024-02-21
Payer: MEDICARE

## 2024-02-21 NOTE — TELEPHONE ENCOUNTER
Patient called for return call about injections.     Attempted to call patient. No answer. Left vm with callback number.

## 2024-02-24 ENCOUNTER — OFFICE VISIT (OUTPATIENT)
Dept: INTERNAL MEDICINE | Facility: CLINIC | Age: 74
End: 2024-02-24
Payer: MEDICARE

## 2024-02-24 VITALS
DIASTOLIC BLOOD PRESSURE: 83 MMHG | HEART RATE: 81 BPM | HEIGHT: 63 IN | SYSTOLIC BLOOD PRESSURE: 137 MMHG | RESPIRATION RATE: 18 BRPM | TEMPERATURE: 98 F | OXYGEN SATURATION: 99 % | WEIGHT: 224 LBS | BODY MASS INDEX: 39.69 KG/M2

## 2024-02-24 DIAGNOSIS — M85.852 OSTEOPENIA OF NECKS OF BOTH FEMURS: ICD-10-CM

## 2024-02-24 DIAGNOSIS — N18.32 TYPE 2 DIABETES MELLITUS WITH STAGE 3B CHRONIC KIDNEY DISEASE, WITHOUT LONG-TERM CURRENT USE OF INSULIN: ICD-10-CM

## 2024-02-24 DIAGNOSIS — N18.32 STAGE 3B CHRONIC KIDNEY DISEASE: ICD-10-CM

## 2024-02-24 DIAGNOSIS — Z00.00 ENCOUNTER FOR PREVENTIVE HEALTH EXAMINATION: Primary | ICD-10-CM

## 2024-02-24 DIAGNOSIS — E11.22 TYPE 2 DIABETES MELLITUS WITH STAGE 3B CHRONIC KIDNEY DISEASE, WITHOUT LONG-TERM CURRENT USE OF INSULIN: ICD-10-CM

## 2024-02-24 DIAGNOSIS — M85.851 OSTEOPENIA OF NECKS OF BOTH FEMURS: ICD-10-CM

## 2024-02-24 PROCEDURE — 99214 OFFICE O/P EST MOD 30 MIN: CPT | Mod: PBBFAC | Performed by: STUDENT IN AN ORGANIZED HEALTH CARE EDUCATION/TRAINING PROGRAM

## 2024-02-24 NOTE — PROGRESS NOTES
Faculty addendum:     I have reviewed and concur with the resident's history, physical, assessment, and plan.  I have discussed with him all issues related to the diagnosis, workup and treatment plan.Care provided as reasonable and necessary.     Patient seen and examined

## 2024-02-24 NOTE — PATIENT INSTRUCTIONS
Counseling and Referral of Other Preventative  (Italic type indicates deductible and co-insurance are waived)    Patient Name: Fabiola Mejia  Today's Date: 2/24/2024    Health Maintenance       Date Due Completion Date    TETANUS VACCINE Never done ---    Colorectal Cancer Screening Never done ---    Shingles Vaccine (1 of 2) Never done ---    RSV Vaccine (Age 60+ and Pregnant patients) (1 - 1-dose 60+ series) Never done ---    COVID-19 Vaccine (2 - 2023-24 season) 09/01/2023 3/23/2021    Mammogram 07/14/2024 7/14/2023    Hemoglobin A1c (Prediabetes) 09/07/2024 9/7/2023    DEXA Scan 09/20/2025 9/20/2022    Lipid Panel 02/21/2028 2/21/2023        No orders of the defined types were placed in this encounter.    The following information is provided to all patients.  This information is to help you find resources for any of the problems found today that may be affecting your health:                  Living healthy guide: www.[facility].[place].gov      Understanding Diabetes: www.diabetes.org      Eating healthy: www.cdc.gov/healthyweight      CDC home safety checklist: www.cdc.gov/steadi/patient.html      Agency on Aging: www.goea.[place].gov      Alcoholics anonymous (AA): www.aa.org      Physical Activity: www.[place].nih.gov/lk4hbqk      Tobacco use: www.quitwithusla.org

## 2024-02-24 NOTE — PROGRESS NOTES
"  Fabiola Mejia presented for a  Medicare AWV and comprehensive Health Risk Assessment today. The following components were reviewed and updated:    Medical history  Family History  Social history  Allergies and Current Medications  Health Risk Assessment  Health Maintenance  Care Team         ** See Completed Assessments for Annual Wellness Visit within the encounter summary.**         The following assessments were completed:  Living Situation  CAGE  Depression Screening  Timed Get Up and Go  Whisper Test  Cognitive Function Screening  Nutrition Screening  ADL Screening  PAQ Screening        Vitals:    02/24/24 1030   BP: 137/83   BP Location: Right arm   Patient Position: Sitting   BP Method: Large (Automatic)   Pulse: 81   Resp: 18   Temp: 97.7 °F (36.5 °C)   TempSrc: Oral   SpO2: 99%   Weight: 101.6 kg (224 lb)   Height: 5' 3" (1.6 m)     Body mass index is 39.68 kg/m².  Physical Exam  Constitutional:       Appearance: Normal appearance.   HENT:      Right Ear: External ear normal.      Left Ear: External ear normal.      Nose: Nose normal.      Mouth/Throat:      Mouth: Mucous membranes are moist.   Eyes:      Extraocular Movements: Extraocular movements intact.      Pupils: Pupils are equal, round, and reactive to light.   Cardiovascular:      Rate and Rhythm: Normal rate and regular rhythm.      Pulses:           Dorsalis pedis pulses are 0 on the right side and 0 on the left side.        Posterior tibial pulses are 0 on the right side and 0 on the left side.   Pulmonary:      Effort: No respiratory distress.      Breath sounds: No wheezing, rhonchi or rales.   Abdominal:      General: There is no distension.      Palpations: Abdomen is soft.      Tenderness: There is no abdominal tenderness.   Musculoskeletal:      Cervical back: Normal range of motion.      Right lower leg: Edema present.      Left lower leg: Edema present.   Feet:      Right foot:      Protective Sensation: 10 sites tested.  10 sites " sensed.      Skin integrity: Callus present.      Left foot:      Protective Sensation: 10 sites tested.  8 sites sensed.      Skin integrity: Callus present.   Skin:     General: Skin is warm and dry.   Neurological:      General: No focal deficit present.      Mental Status: She is alert and oriented to person, place, and time.         Protective Sensation (w/ 10 gram monofilament):  Right: Intact  Left: Absent    Visual Inspection:  Callus -  Bilateral    Pedal Pulses:   Right: Absent  Left: Absent    Posterior Tibialis Pulses:   Right:Absent  Left: Absent            Diagnoses and health risks identified today and associated recommendations/orders:    1. Encounter for preventive health examination  - Mammogram due 7/2024  - Follows Dr. Barnes for colonoscopies  - Lifetime nonsmoker  - Currently due for shingles and tetanus vaccinations but she declines  - Does not see or want to see GYN at this time  - Eye exam next visit - patient has to leave with daughter as she has to go to work    2. Type 2 diabetes mellitus with stage 3b chronic kidney disease, without long-term current use of insulin  - A1c 6.0 9/6/23  - Would benefit from shoes, have evidence of callus formation on both feet.  Also has areas of decreased sensation on the left foot.  Unable to palpate pedal pulses, patient would benefit from ABIs in the future.  Will prescribed diabetic shoes    3. Stage 3b chronic kidney disease  - has been referred to nephrology clinic already    4. Osteopenia of necks of both femurs  - on vitamin-D supplementation.  Management per PCP      Provided Fabiola with a 5-10 year written screening schedule and personal prevention plan. Recommendations were developed using the USPSTF age appropriate recommendations. Education, counseling, and referrals were provided as needed. After Visit Summary printed and given to patient which includes a list of additional screenings\tests needed.    Follow up in about 1 year (around  2/24/2025).    Taye Avina MD

## 2024-02-29 ENCOUNTER — TELEPHONE (OUTPATIENT)
Dept: INTERNAL MEDICINE | Facility: CLINIC | Age: 74
End: 2024-02-29
Payer: MEDICARE

## 2024-02-29 ENCOUNTER — LAB VISIT (OUTPATIENT)
Dept: LAB | Facility: HOSPITAL | Age: 74
End: 2024-02-29
Attending: NURSE PRACTITIONER
Payer: MEDICARE

## 2024-02-29 DIAGNOSIS — N18.30 CONTROLLED TYPE 2 DIABETES MELLITUS WITH STAGE 3 CHRONIC KIDNEY DISEASE, WITHOUT LONG-TERM CURRENT USE OF INSULIN: ICD-10-CM

## 2024-02-29 DIAGNOSIS — E11.22 TYPE 2 DIABETES MELLITUS WITH STAGE 3B CHRONIC KIDNEY DISEASE, WITHOUT LONG-TERM CURRENT USE OF INSULIN: ICD-10-CM

## 2024-02-29 DIAGNOSIS — N18.32 TYPE 2 DIABETES MELLITUS WITH STAGE 3B CHRONIC KIDNEY DISEASE, WITHOUT LONG-TERM CURRENT USE OF INSULIN: ICD-10-CM

## 2024-02-29 DIAGNOSIS — E11.22 CONTROLLED TYPE 2 DIABETES MELLITUS WITH STAGE 3 CHRONIC KIDNEY DISEASE, WITHOUT LONG-TERM CURRENT USE OF INSULIN: ICD-10-CM

## 2024-02-29 DIAGNOSIS — E78.2 MIXED HYPERLIPIDEMIA: ICD-10-CM

## 2024-02-29 DIAGNOSIS — M85.80 OSTEOPENIA, UNSPECIFIED LOCATION: ICD-10-CM

## 2024-02-29 DIAGNOSIS — N18.32 STAGE 3B CHRONIC KIDNEY DISEASE: ICD-10-CM

## 2024-02-29 LAB
ALBUMIN SERPL-MCNC: 3.9 G/DL (ref 3.4–4.8)
ALBUMIN/GLOB SERPL: 1 RATIO (ref 1.1–2)
ALP SERPL-CCNC: 59 UNIT/L (ref 40–150)
ALT SERPL-CCNC: 11 UNIT/L (ref 0–55)
AST SERPL-CCNC: 16 UNIT/L (ref 5–34)
BASOPHILS # BLD AUTO: 0.03 X10(3)/MCL
BASOPHILS NFR BLD AUTO: 0.6 %
BILIRUB SERPL-MCNC: 0.8 MG/DL
BUN SERPL-MCNC: 22.9 MG/DL (ref 9.8–20.1)
CALCIUM SERPL-MCNC: 10 MG/DL (ref 8.4–10.2)
CHLORIDE SERPL-SCNC: 104 MMOL/L (ref 98–107)
CHOLEST SERPL-MCNC: 219 MG/DL
CHOLEST/HDLC SERPL: 5 {RATIO} (ref 0–5)
CO2 SERPL-SCNC: 29 MMOL/L (ref 23–31)
CREAT SERPL-MCNC: 1.36 MG/DL (ref 0.55–1.02)
DEPRECATED CALCIDIOL+CALCIFEROL SERPL-MC: 54.5 NG/ML (ref 30–80)
EOSINOPHIL # BLD AUTO: 0.09 X10(3)/MCL (ref 0–0.9)
EOSINOPHIL NFR BLD AUTO: 1.8 %
ERYTHROCYTE [DISTWIDTH] IN BLOOD BY AUTOMATED COUNT: 14.2 % (ref 11.5–17)
GFR SERPLBLD CREATININE-BSD FMLA CKD-EPI: 41 MLS/MIN/1.73/M2
GLOBULIN SER-MCNC: 4 GM/DL (ref 2.4–3.5)
GLUCOSE SERPL-MCNC: 118 MG/DL (ref 82–115)
HCT VFR BLD AUTO: 41.8 % (ref 37–47)
HDLC SERPL-MCNC: 47 MG/DL (ref 35–60)
HGB BLD-MCNC: 13.2 G/DL (ref 12–16)
IMM GRANULOCYTES # BLD AUTO: 0 X10(3)/MCL (ref 0–0.04)
IMM GRANULOCYTES NFR BLD AUTO: 0 %
LDLC SERPL CALC-MCNC: 145 MG/DL (ref 50–140)
LYMPHOCYTES # BLD AUTO: 2.02 X10(3)/MCL (ref 0.6–4.6)
LYMPHOCYTES NFR BLD AUTO: 41.1 %
MCH RBC QN AUTO: 29.5 PG (ref 27–31)
MCHC RBC AUTO-ENTMCNC: 31.6 G/DL (ref 33–36)
MCV RBC AUTO: 93.5 FL (ref 80–94)
MONOCYTES # BLD AUTO: 0.38 X10(3)/MCL (ref 0.1–1.3)
MONOCYTES NFR BLD AUTO: 7.7 %
NEUTROPHILS # BLD AUTO: 2.39 X10(3)/MCL (ref 2.1–9.2)
NEUTROPHILS NFR BLD AUTO: 48.8 %
NRBC BLD AUTO-RTO: 0 %
PLATELET # BLD AUTO: 205 X10(3)/MCL (ref 130–400)
PMV BLD AUTO: 11.1 FL (ref 7.4–10.4)
POTASSIUM SERPL-SCNC: 4.4 MMOL/L (ref 3.5–5.1)
PROT SERPL-MCNC: 7.9 GM/DL (ref 5.8–7.6)
RBC # BLD AUTO: 4.47 X10(6)/MCL (ref 4.2–5.4)
SODIUM SERPL-SCNC: 142 MMOL/L (ref 136–145)
T4 FREE SERPL-MCNC: 0.78 NG/DL (ref 0.7–1.48)
TRIGL SERPL-MCNC: 135 MG/DL (ref 37–140)
TSH SERPL-ACNC: 1.35 UIU/ML (ref 0.35–4.94)
VLDLC SERPL CALC-MCNC: 27 MG/DL
WBC # SPEC AUTO: 4.91 X10(3)/MCL (ref 4.5–11.5)

## 2024-02-29 PROCEDURE — 80053 COMPREHEN METABOLIC PANEL: CPT

## 2024-02-29 PROCEDURE — 36415 COLL VENOUS BLD VENIPUNCTURE: CPT

## 2024-02-29 PROCEDURE — 85025 COMPLETE CBC W/AUTO DIFF WBC: CPT

## 2024-02-29 PROCEDURE — 82306 VITAMIN D 25 HYDROXY: CPT

## 2024-02-29 PROCEDURE — 80061 LIPID PANEL: CPT

## 2024-02-29 PROCEDURE — 84439 ASSAY OF FREE THYROXINE: CPT

## 2024-02-29 PROCEDURE — 84443 ASSAY THYROID STIM HORMONE: CPT

## 2024-02-29 NOTE — TELEPHONE ENCOUNTER
----- Message from Hazel Ghosh sent at 2/29/2024 12:05 PM CST -----  Regarding: return call  Type:  Patient Returning Call    Who Called: pt      Does the patient know what this is regarding?: Rx for wheelchair    Would the patient rather a call back or a response via [facility]?  Yes    Best Call Back Number: [phone]    Additional Information:  pt called for return callback to discuss getting Rx for wheelchair from ([place], [place] [phone]), please advise, thanks

## 2024-03-05 ENCOUNTER — OFFICE VISIT (OUTPATIENT)
Dept: INTERNAL MEDICINE | Facility: CLINIC | Age: 74
End: 2024-03-05
Payer: MEDICARE

## 2024-03-05 VITALS
DIASTOLIC BLOOD PRESSURE: 77 MMHG | BODY MASS INDEX: 38.62 KG/M2 | WEIGHT: 218 LBS | HEIGHT: 63 IN | SYSTOLIC BLOOD PRESSURE: 132 MMHG | HEART RATE: 81 BPM | RESPIRATION RATE: 18 BRPM | TEMPERATURE: 98 F

## 2024-03-05 DIAGNOSIS — N18.32 TYPE 2 DIABETES MELLITUS WITH STAGE 3B CHRONIC KIDNEY DISEASE, WITHOUT LONG-TERM CURRENT USE OF INSULIN: ICD-10-CM

## 2024-03-05 DIAGNOSIS — E66.9 OBESITY, UNSPECIFIED CLASSIFICATION, UNSPECIFIED OBESITY TYPE, UNSPECIFIED WHETHER SERIOUS COMORBIDITY PRESENT: ICD-10-CM

## 2024-03-05 DIAGNOSIS — E11.22 TYPE 2 DIABETES MELLITUS WITH STAGE 3B CHRONIC KIDNEY DISEASE, WITHOUT LONG-TERM CURRENT USE OF INSULIN: ICD-10-CM

## 2024-03-05 DIAGNOSIS — M79.7 FIBROMYALGIA: Primary | ICD-10-CM

## 2024-03-05 DIAGNOSIS — N18.32 STAGE 3B CHRONIC KIDNEY DISEASE: ICD-10-CM

## 2024-03-05 DIAGNOSIS — M54.9 BACK PAIN, UNSPECIFIED BACK LOCATION, UNSPECIFIED BACK PAIN LATERALITY, UNSPECIFIED CHRONICITY: ICD-10-CM

## 2024-03-05 DIAGNOSIS — E78.2 MIXED HYPERLIPIDEMIA: ICD-10-CM

## 2024-03-05 DIAGNOSIS — Z12.31 BREAST CANCER SCREENING BY MAMMOGRAM: ICD-10-CM

## 2024-03-05 DIAGNOSIS — R79.89 ELEVATED SERUM CREATININE: ICD-10-CM

## 2024-03-05 DIAGNOSIS — M35.9 CONNECTIVE TISSUE DISORDER: ICD-10-CM

## 2024-03-05 DIAGNOSIS — Z12.11 COLON CANCER SCREENING: ICD-10-CM

## 2024-03-05 DIAGNOSIS — M17.0 PRIMARY OSTEOARTHRITIS OF BOTH KNEES: ICD-10-CM

## 2024-03-05 DIAGNOSIS — Z00.00 WELL ADULT EXAM: ICD-10-CM

## 2024-03-05 PROCEDURE — 99214 OFFICE O/P EST MOD 30 MIN: CPT | Mod: S$PBB,,, | Performed by: NURSE PRACTITIONER

## 2024-03-05 PROCEDURE — 1159F MED LIST DOCD IN RCRD: CPT | Mod: CPTII,,, | Performed by: NURSE PRACTITIONER

## 2024-03-05 PROCEDURE — 99215 OFFICE O/P EST HI 40 MIN: CPT | Mod: PBBFAC | Performed by: NURSE PRACTITIONER

## 2024-03-05 PROCEDURE — 1101F PT FALLS ASSESS-DOCD LE1/YR: CPT | Mod: CPTII,,, | Performed by: NURSE PRACTITIONER

## 2024-03-05 PROCEDURE — 3078F DIAST BP <80 MM HG: CPT | Mod: CPTII,,, | Performed by: NURSE PRACTITIONER

## 2024-03-05 PROCEDURE — 3288F FALL RISK ASSESSMENT DOCD: CPT | Mod: CPTII,,, | Performed by: NURSE PRACTITIONER

## 2024-03-05 PROCEDURE — 3075F SYST BP GE 130 - 139MM HG: CPT | Mod: CPTII,,, | Performed by: NURSE PRACTITIONER

## 2024-03-05 PROCEDURE — 3008F BODY MASS INDEX DOCD: CPT | Mod: CPTII,,, | Performed by: NURSE PRACTITIONER

## 2024-03-05 PROCEDURE — 1126F AMNT PAIN NOTED NONE PRSNT: CPT | Mod: CPTII,,, | Performed by: NURSE PRACTITIONER

## 2024-03-05 RX ORDER — PANTOPRAZOLE SODIUM 40 MG/1
40 TABLET, DELAYED RELEASE ORAL DAILY
COMMUNITY
Start: 2024-02-19

## 2024-03-05 NOTE — PROGRESS NOTES
"   Patient ID: [id number]     Chief Complaint: LAB RESULTS        HPI:     HPI      Fabiola Mejia is a 73 y.o. female here today for a follow up.           ----------------------------  CKD (chronic kidney disease) stage 3, GFR 30-59 ml/min  Connective tissue disorder  Degenerative arthritis of knee, bilateral  Diabetes mellitus due to underlying condition with stage 3 chronic   kidney disease, without long-term current use of insulin, unspecified   whether stage 3a or 3b CKD  Fibromyalgia  HLD (hyperlipidemia)  HTN (hypertension)  Osteopenia     Past Surgical History:   Procedure Laterality Date    TONSILLECTOMY         Review of patient's allergies indicates:   Allergen Reactions    Codeine Other (See Comments)    Gabapentin     Hydroxychloroquine      Other Reaction(s): Loose bowel movement    Methocarbamol      Other Reaction(s): makes her sleepy and feel "funny"    Acetaminophen-codeine Other (See Comments)    Tramadol Rash       Current Outpatient Medications   Medication Instructions    clobetasoL (TEMOVATE) 0.05 % cream     CONTOUR NEXT TEST STRIPS Strp USE TO TEST BLOOD SUGAR DAILY    dapagliflozin propanediol (FARXIGA) 5 mg, Oral, Daily    desonide (DESOWEN) 0.05 % cream     diclofenac sodium (VOLTAREN) 4 g, Topical (Top), 4 times daily PRN    ergocalciferol (ERGOCALCIFEROL) 50,000 Units, Oral, Every 7 days    glipiZIDE (GLUCOTROL) 5 MG tablet Take 1 tab po daily.    MICROLET LANCET Misc USE TO TEST BLOOD SUGAR ONCE DAILY    pantoprazole (PROTONIX) 40 mg, Oral    pioglitazone (ACTOS) 15 MG tablet Take 1 tab po daily.    pravastatin (PRAVACHOL) 40 MG tablet Take 1 tab po Q hs.    spironolactone (ALDACTONE) 25 MG tablet Take 1 tab po daily.       Social History     Socioeconomic History    Marital status:    Tobacco Use    Smoking status: Never    Smokeless tobacco: Never   Substance and Sexual Activity    Alcohol use: Never    Drug use: Never    Sexual activity: Not Currently     Social Determinants of " Health     Financial Resource Strain: Low Risk  (1/31/2024)    Overall Financial Resource Strain (CARDIA)     Difficulty of Paying Living Expenses: Not hard at all   Food Insecurity: No Food Insecurity (1/31/2024)    Hunger Vital Sign     Worried About Running Out of Food in the Last Year: Never true     Ran Out of Food in the Last Year: Never true   Transportation Needs: No Transportation Needs (1/31/2024)    PRAPARE - Transportation     Lack of Transportation (Medical): No     Lack of Transportation (Non-Medical): No   Physical Activity: Inactive (1/31/2024)    Exercise Vital Sign     Days of Exercise per Week: 0 days     Minutes of Exercise per Session: 0 min   Stress: No Stress Concern Present (1/31/2024)    [employer] [place] [employer] - Occupational Stress Questionnaire     Feeling of Stress : Not at all   Social Connections: Moderately Isolated (1/31/2024)    Social Connection and Isolation Panel [NHANES]     Frequency of Communication with Friends and Family: More than three times a week     Frequency of Social Gatherings with Friends and Family: More than three times a week     Attends Anabaptism Services: More than 4 times per year     Active Member of Clubs or Organizations: No     Attends Club or Organization Meetings: Never     Marital Status:    Housing Stability: Low Risk  (1/31/2024)    Housing Stability Vital Sign     Unable to Pay for Housing in the Last Year: No     Number of Places Lived in the Last Year: 1     Unstable Housing in the Last Year: No        Family History   Problem Relation Age of Onset    Hypertension Mother     Diabetes Mother     Heart disease Mother     No Known Problems Father         Patient Care Team:  Mariama Rios FNP as PCP - General (Family Medicine)     Subjective:     Review of Systems     See HPI for details    Constitutional: Denies Change in appetite. Denies Chills. Denies Fever. Denies Night sweats.  Eye: Denies Blurred vision. Denies  "Discharge. Denies Eye pain.  ENT: Denies Decreased hearing. Denies Sore throat. Denies Swollen glands.  Respiratory: Denies Cough. Denies Shortness of breath. Denies Shortness of breath with exertion. Denies Wheezing.  Cardiovascular: DeniesChest pain at rest. Denies Chest pain with exertion. Denies Irregular heartbeat. Denies Palpitations. Denies Edema.  Gastrointestinal: Denies Abdominal pain. DeniesDiarrhea. Denies Nausea. Denies Vomiting. Denies Hematemesis or Hematochezia.  Genitourinary: Denies Dysuria. Denies Urinary frequency. Denies Urinary urgency. Denies Blood in urine.  Endocrine: Denies Cold intolerance. Denies Excessive thirst. Denies Heat intolerance. Denies Weight loss. Denies Weight gain.  Musculoskeletal: Denies Painful joints. Denies Weakness.  Integumentary: Denies Rash. Denies Itching. Denies Dry skin.  Neurologic: Denies Dizziness. Denies Fainting. Denies Headache.  Psychiatric: Denies Depression. Denies Anxiety. Denies Suicidal/Homicidal ideations.    All Other ROS: Negative except as stated in HPI.       Objective:     Visit Vitals  /77 (BP Location: Right arm, Patient Position: Sitting, BP Method: Large (Automatic))   Pulse 81   Temp 97.8 °F (36.6 °C) (Oral)   Resp 18   Ht 5' 3" (1.6 m)   Wt 98.9 kg (218 lb)   BMI 38.62 kg/m²       Physical Exam    General: Alert and oriented, No acute distress.  Head: Normocephalic, Atraumatic.  Eye: Pupils are equal, round and reactive to light, Extraocular movements are intact, Sclera non-icteric.  Ears/Nose/Throat: Normal, Mucosa moist,Clear.  Neck/Thyroid: Supple, Non-tender, No carotid bruit, No lymphadenopathy, No JVD, Full range of motion.  Respiratory: Clear to auscultation bilaterally; No wheezes, rales or rhonchi,Non-labored respirations, Symmetrical chest wall expansion.  Cardiovascular: Regular rate and rhythm, S1/S2 normal, No murmurs, rubs or gallops.  Gastrointestinal: Soft, Non-tender, Non-distended, Normal bowel sounds, No palpable " organomegaly.  Musculoskeletal: Normal range of motion.  Integumentary: Warm, Dry, Intact, No suspicious lesions or rashes.  Extremities: No clubbing, cyanosis or edema  Neurologic: No focal deficits, Cranial Nerves II-XII are grossly intact, Motor strength normal upper and lower extremities, Sensory exam intact.  Psychiatric: Normal interaction, Coherent speech, Euthymic mood, Appropriate affect       Labs Reviewed:     Chemistry:  Lab Results   Component Value Date     02/29/2024    K 4.4 02/29/2024    CHLORIDE 104 02/29/2024    BUN 22.9 (H) 02/29/2024    CREATININE 1.36 (H) 02/29/2024    EGFRNORACEVR 41 02/29/2024    GLUCOSE 118 (H) 02/29/2024    CALCIUM 10.0 02/29/2024    ALKPHOS 59 02/29/2024    LABPROT 7.9 (H) 02/29/2024    ALBUMIN 3.9 02/29/2024    BILIDIR 0.3 02/21/2023    IBILI 0.40 02/21/2023    AST 16 02/29/2024    ALT 11 02/29/2024    MCHGNBKA45LD 54.5 02/29/2024        Lab Results   Component Value Date    HGBA1C 6.0 09/07/2023        Hematology:  Lab Results   Component Value Date    WBC 4.91 02/29/2024    HGB 13.2 02/29/2024    HCT 41.8 02/29/2024     02/29/2024       Lipid Panel:  Lab Results   Component Value Date    CHOL 219 (H) 02/29/2024    HDL 47 02/29/2024    .00 (H) 02/29/2024    TRIG 135 02/29/2024    TOTALCHOLEST 5 02/29/2024        Urine:  Lab Results   Component Value Date    APPEARANCEUA Clear 02/24/2019    PROTEINUA Negative 02/24/2019    LEUKOCYTESUR 250 (A) 02/24/2019    RBCUA 0-2 02/24/2019    WBCUA 11-25 (A) 02/24/2019    BACTERIA None Seen 02/24/2019    SQEPUA  (A) 02/24/2019    HYALINECASTS 0-2 (A) 02/24/2019        Assessment:       ICD-10-CM ICD-9-CM   1. Fibromyalgia  M79.7 729.1   2. Connective tissue disorder  M35.9 710.9   3. Mixed hyperlipidemia  E78.2 272.2   4. Elevated serum creatinine  R79.89 790.99   5. Stage 3b chronic kidney disease  N18.32 585.3   6. Type 2 diabetes mellitus with stage 3b chronic kidney disease, without long-term current use  of insulin  E11.22 250.40    N18.32 585.3   7. Obesity, unspecified classification, unspecified obesity type, unspecified whether serious comorbidity present  E66.9 278.00   8. Breast cancer screening by mammogram  Z12.31 V76.12   9. Well adult exam  Z00.00 V70.0   10. Primary osteoarthritis of both knees  M17.0 715.16        Plan:     1. Fibromyalgia  Pt was seeing her PCP in past for management. Referred to Rheum cl for further eval and mgmt. Keep appt 4-29-25.    2. Connective tissue disorder  Pt was seeing her PCP in past for management. Referred to Rheum cl for further eval and mgmt. Keep appt 4-29-25.    3. Mixed hyperlipidemia  Improvement noted. Low fat diet and exercise.  Cont Pravastatin as prescribed.     4. Elevated serum creatinine  Creatinine 1.36 up from 1.31. Encouraged low protein low salt diet, maintain good BP levels, drink plenty of water, avoid NSAIDs. Keep renal clinic appt 4-24-24.     5. Stage 3b chronic kidney disease  Creatinine 1.36 up from 1.31. Encouraged low protein low salt diet, maintain good BP levels, drink plenty of water, avoid NSAIDs. Keep renal clinic appt 4-24-24.     6. Type 2 diabetes mellitus with stage 3b chronic kidney disease, without long-term current use of insulin  A1c 6.0. ADA diet and exercise. Cont Glipzide, pioglitazone.  Pt started on  Farxiga 5 mg 1 tab po daily on last visit. Drink plenty of water. Urine microalbumin in 3 months. DM foot exam done 2-24-24. DM eye exam scheduled with Dr Mcpherson at [facility] 5-5-24- pt will get Dr Mcpherson's office to fax records for her chart.     7. Obesity, unspecified classification, unspecified obesity type, unspecified whether serious comorbidity present  BMI 39. Encouraged low fat diet and exercise. Education provided.     8. Well adult  Labs in 3 months. MMG in 5 months. No papsmears due to age.     9. Osteoarthritis knee  Pt has OA left knee with knee pain affecting ambulation.  Pt requesting RX for rollator due to knee  pain. The patient needs more stability than provided ty a cane. RX given to pt for rollator to get filled at DME company of her choice covered on her insurance. Pt needs a rollator with a seat for rest when needed.   XR knee done at [facility]:  XR Knee 3 View Left (09/15/2018 10:21 PM CDT)  Imaging Results - XR Knee 3 View Left (09/15/2018 10:21 PM CDT)  Anatomical Region Laterality Modality   Knee   Digital Radiography     Imaging Results - XR Knee 3 View Left (09/15/2018 10:21 PM CDT)  Specimen (Source) Anatomical Location / Laterality Collection Method / Volume Collection Time Received Time                 Imaging Results - XR Knee 3 View Left (09/15/2018 10:21 PM CDT)  Impressions   09/16/2018 8:21 AM CDT   No acute osseous, joint or soft tissue abnormality.      Imaging Results - XR Knee 3 View Left (09/15/2018 10:21 PM CDT)  Narrative   09/16/2018 8:21 AM CDT   REASON FOR EXAM: fall on cement c/o of pain everywhere    FINDINGS: No evidence of fracture or subluxation. Medial compartment osteoarthritis. No evidence of capsular distention. Soft tissues are unremarkable.        Imaging Results - XR Knee 3 View Left (09/15/2018 10:21 PM CDT)  Procedure Note   Pramod Izquierdo III, MD - 09/16/2018   Formatting of this note might be different from the original.  REASON FOR EXAM: fall on cement c/o of pain everywhere    FINDINGS: No evidence of fracture or subluxation. Medial compartment osteoarthritis. No evidence of capsular distention. Soft tissues are unremarkable.    IMPRESSION:  No acute osseous, joint or soft tissue abnormality.     Imaging Results - XR Knee 3 View Left (09/15/2018 10:21 PM CDT)  Authorizing Provider Result Type   Wilman Benedict Jr., MD IMG DIAGNOSTIC IMAGING ORDERABLES      10. Back pain  Pt c/o of low back pain especially when sweeping. Denies hx of back injury in past. Will get XR T-spine and L-spine before next visit.         Follow up in about 3 months (around 6/5/2024) for with labs 1 week  prior to appt. . In addition to their scheduled follow up, the patient has also been instructed to follow up on as needed basis.     Future Appointments   Date Time Provider Department Center   4/24/2024 11:45 AM Shikha Ramirez FNP [facility]    4/29/2025 10:00 AM Mariana Whelan MD [facility]        [facility] [place]

## 2024-03-07 ENCOUNTER — TELEPHONE (OUTPATIENT)
Dept: INTERNAL MEDICINE | Facility: CLINIC | Age: 74
End: 2024-03-07
Payer: MEDICARE

## 2024-03-07 NOTE — TELEPHONE ENCOUNTER
Returned patients call I informed her she had enough refills on her diclofenac gel. Patient informed me the RX was too expensive and she was going to purchased something over the counter.

## 2024-03-11 ENCOUNTER — TELEPHONE (OUTPATIENT)
Dept: INTERNAL MEDICINE | Facility: CLINIC | Age: 74
End: 2024-03-11
Payer: MEDICARE

## 2024-03-11 NOTE — TELEPHONE ENCOUNTER
----- Message from Abelino Sullivan sent at 3/8/2024  1:54 PM CST -----  Type:  Needs Medical Advice    Who Called: Tosha [facility]    Best Call Back Number: f# [phone]    Additional Information: please fax recent chart notes, patient order received

## 2024-03-13 ENCOUNTER — HOSPITAL ENCOUNTER (OUTPATIENT)
Dept: RADIOLOGY | Facility: HOSPITAL | Age: 74
Discharge: HOME | End: 2024-03-13
Attending: NURSE PRACTITIONER
Payer: MEDICARE

## 2024-03-13 DIAGNOSIS — M54.9 BACK PAIN, UNSPECIFIED BACK LOCATION, UNSPECIFIED BACK PAIN LATERALITY, UNSPECIFIED CHRONICITY: ICD-10-CM

## 2024-03-13 PROCEDURE — 72070 X-RAY EXAM THORAC SPINE 2VWS: CPT | Mod: TC

## 2024-03-13 PROCEDURE — 72100 X-RAY EXAM L-S SPINE 2/3 VWS: CPT | Mod: TC

## 2024-03-15 DIAGNOSIS — E11.22 TYPE 2 DIABETES MELLITUS WITH STAGE 3B CHRONIC KIDNEY DISEASE, WITHOUT LONG-TERM CURRENT USE OF INSULIN: Primary | ICD-10-CM

## 2024-03-15 DIAGNOSIS — N18.32 TYPE 2 DIABETES MELLITUS WITH STAGE 3B CHRONIC KIDNEY DISEASE, WITHOUT LONG-TERM CURRENT USE OF INSULIN: Primary | ICD-10-CM

## 2024-03-15 NOTE — TELEPHONE ENCOUNTER
----- Message from Abelino Sullivan sent at 3/15/2024  9:03 AM CDT -----  Type:  RX Refill Request    Who Called: pt  Refill or New Rx:refill  RX Name and Strength:CONTOUR NEXT TEST STRIPS Strp    Preferred Pharmacy with phone number:[facility] in [facility]       Best Call Back Number: [phone]    Additional Information: pt would like test strips sent to above pharmacy (pt does not want strips sent via mail order), pt asked for a call to confirm

## 2024-03-17 RX ORDER — BLOOD SUGAR DIAGNOSTIC
STRIP MISCELLANEOUS
Qty: 50 STRIP | Refills: 11 | Status: SHIPPED | OUTPATIENT
Start: 2024-03-17 | End: 2024-04-22

## 2024-03-28 ENCOUNTER — NURSE TRIAGE (OUTPATIENT)
Dept: ADMINISTRATIVE | Facility: CLINIC | Age: 74
End: 2024-03-28
Payer: MEDICARE

## 2024-03-28 NOTE — TELEPHONE ENCOUNTER
Pt calls stating that she was expecting a callback from a nurse today regarding a RX for a walker. Pt reports that her medical equipment company has not received the correct paperwork from her PCP's office. She states that she has been trying to get a walker since 3/8/24.  [employer] is contacted at  [phone] by this NT. Carolyn from [employer] advises that the office is closed until Monday. She will route a message to have pt and NT called back on Monday. Pt is informed. She again states that she has not heard from her PCP's office and that her NP needs to get a signature for the MD that she works under. Nt advises pt that a high priority message is being routed to her PCP's office. Pt is instructed to call back if she does not hear from PCP during clinic hours on Monday, 4/1/24, or if she has any additional questions or concerns. Pt verbalizes understanding.   Reason for Disposition   [1] Caller requesting NON-URGENT health information AND [2] PCP's office is the best resource    Protocols used: Information Only Call - No Triage-A-

## 2024-04-02 NOTE — TELEPHONE ENCOUNTER
Spoke to pt . Pt stated Order for her walker needs to be signed by PCP'S Collaborator. States PCP is not in network, but MD is . Order is noted in pt's chart .  Also, order has been printed and placed in PCP's folder.   Please advise .

## 2024-04-05 ENCOUNTER — TELEPHONE (OUTPATIENT)
Dept: INTERNAL MEDICINE | Facility: CLINIC | Age: 74
End: 2024-04-05
Payer: MEDICARE

## 2024-04-05 NOTE — TELEPHONE ENCOUNTER
Received fax from pts medical supply. Spoke with joanna, she said they need signed order and notes addended and faxed to them, received it all and faxed over this morning and will scan in once receive confirmation fax. Left pt vml notifying her to contact them for further info later today/monday

## 2024-04-05 NOTE — TELEPHONE ENCOUNTER
----- Message from Nunu Ornelas sent at 4/4/2024  3:52 PM CDT -----  Regarding: FW: med advice    ----- Message -----  From: Ning Dempsey  Sent: 4/4/2024  10:46 AM CDT  To: [facility] Internal Medicine Clerical Pool  Subject: med advice                                       .Type:  Needs Medical Advice    Who Called: Pt  Symptoms (please be specific):    How long has patient had these symptoms:    Pharmacy name and phone #:    Would the patient rather a call back or a response via [facility]? Call back  Best Call Back Number: [phone]  Additional Information: pt wants a update about paperwork needed to get her walker.

## 2024-04-17 DIAGNOSIS — N18.9 CHRONIC KIDNEY DISEASE, UNSPECIFIED CKD STAGE: Primary | ICD-10-CM

## 2024-04-22 ENCOUNTER — LAB VISIT (OUTPATIENT)
Dept: LAB | Facility: HOSPITAL | Age: 74
End: 2024-04-22
Attending: NURSE PRACTITIONER
Payer: MEDICARE

## 2024-04-22 DIAGNOSIS — E11.22 TYPE 2 DIABETES MELLITUS WITH STAGE 3B CHRONIC KIDNEY DISEASE, WITHOUT LONG-TERM CURRENT USE OF INSULIN: Primary | ICD-10-CM

## 2024-04-22 DIAGNOSIS — N18.32 TYPE 2 DIABETES MELLITUS WITH STAGE 3B CHRONIC KIDNEY DISEASE, WITHOUT LONG-TERM CURRENT USE OF INSULIN: Primary | ICD-10-CM

## 2024-04-22 DIAGNOSIS — N18.32 TYPE 2 DIABETES MELLITUS WITH STAGE 3B CHRONIC KIDNEY DISEASE, WITHOUT LONG-TERM CURRENT USE OF INSULIN: ICD-10-CM

## 2024-04-22 DIAGNOSIS — E11.22 TYPE 2 DIABETES MELLITUS WITH STAGE 3B CHRONIC KIDNEY DISEASE, WITHOUT LONG-TERM CURRENT USE OF INSULIN: ICD-10-CM

## 2024-04-22 DIAGNOSIS — N18.9 CHRONIC KIDNEY DISEASE, UNSPECIFIED CKD STAGE: ICD-10-CM

## 2024-04-22 DIAGNOSIS — E78.2 MIXED HYPERLIPIDEMIA: ICD-10-CM

## 2024-04-22 LAB
ALBUMIN SERPL-MCNC: 3.7 G/DL (ref 3.4–4.8)
ALBUMIN/GLOB SERPL: 0.9 RATIO (ref 1.1–2)
ALP SERPL-CCNC: 61 UNIT/L (ref 40–150)
ALT SERPL-CCNC: 15 UNIT/L (ref 0–55)
APPEARANCE UR: CLEAR
AST SERPL-CCNC: 21 UNIT/L (ref 5–34)
BACTERIA #/AREA URNS AUTO: ABNORMAL /HPF
BASOPHILS # BLD AUTO: 0.03 X10(3)/MCL
BASOPHILS NFR BLD AUTO: 0.6 %
BILIRUB SERPL-MCNC: 0.9 MG/DL
BILIRUB UR QL STRIP.AUTO: NEGATIVE
BUN SERPL-MCNC: 19.5 MG/DL (ref 9.8–20.1)
CALCIUM SERPL-MCNC: 10 MG/DL (ref 8.4–10.2)
CHLORIDE SERPL-SCNC: 107 MMOL/L (ref 98–107)
CHOLEST SERPL-MCNC: 203 MG/DL
CHOLEST/HDLC SERPL: 4 {RATIO} (ref 0–5)
CO2 SERPL-SCNC: 29 MMOL/L (ref 23–31)
COLOR UR AUTO: COLORLESS
CREAT SERPL-MCNC: 1.15 MG/DL (ref 0.55–1.02)
CREAT UR-MCNC: 46.5 MG/DL (ref 45–106)
EOSINOPHIL # BLD AUTO: 0.05 X10(3)/MCL (ref 0–0.9)
EOSINOPHIL NFR BLD AUTO: 1.1 %
ERYTHROCYTE [DISTWIDTH] IN BLOOD BY AUTOMATED COUNT: 14.6 % (ref 11.5–17)
EST. AVERAGE GLUCOSE BLD GHB EST-MCNC: 134.1 MG/DL
GFR SERPLBLD CREATININE-BSD FMLA CKD-EPI: 50 MLS/MIN/1.73/M2
GLOBULIN SER-MCNC: 4 GM/DL (ref 2.4–3.5)
GLUCOSE SERPL-MCNC: 114 MG/DL (ref 82–115)
GLUCOSE UR QL STRIP.AUTO: ABNORMAL
HBA1C MFR BLD: 6.3 %
HCT VFR BLD AUTO: 40.2 % (ref 37–47)
HDLC SERPL-MCNC: 55 MG/DL (ref 35–60)
HGB BLD-MCNC: 13.1 G/DL (ref 12–16)
HYALINE CASTS #/AREA URNS LPF: ABNORMAL /LPF
IMM GRANULOCYTES # BLD AUTO: 0 X10(3)/MCL (ref 0–0.04)
IMM GRANULOCYTES NFR BLD AUTO: 0 %
KETONES UR QL STRIP.AUTO: NEGATIVE
LDLC SERPL CALC-MCNC: 132 MG/DL (ref 50–140)
LEUKOCYTE ESTERASE UR QL STRIP.AUTO: 250
LYMPHOCYTES # BLD AUTO: 1.64 X10(3)/MCL (ref 0.6–4.6)
LYMPHOCYTES NFR BLD AUTO: 35.4 %
MCH RBC QN AUTO: 30 PG (ref 27–31)
MCHC RBC AUTO-ENTMCNC: 32.6 G/DL (ref 33–36)
MCV RBC AUTO: 92 FL (ref 80–94)
MONOCYTES # BLD AUTO: 0.33 X10(3)/MCL (ref 0.1–1.3)
MONOCYTES NFR BLD AUTO: 7.1 %
NEUTROPHILS # BLD AUTO: 2.58 X10(3)/MCL (ref 2.1–9.2)
NEUTROPHILS NFR BLD AUTO: 55.8 %
NITRITE UR QL STRIP.AUTO: NEGATIVE
NRBC BLD AUTO-RTO: 0 %
PH UR STRIP.AUTO: 6 [PH]
PLATELET # BLD AUTO: 158 X10(3)/MCL (ref 130–400)
PMV BLD AUTO: 11.3 FL (ref 7.4–10.4)
POTASSIUM SERPL-SCNC: 4.2 MMOL/L (ref 3.5–5.1)
PROT SERPL-MCNC: 7.7 GM/DL (ref 5.8–7.6)
PROT UR QL STRIP.AUTO: NEGATIVE
PROT UR STRIP-MCNC: <6.8 MG/DL
RBC # BLD AUTO: 4.37 X10(6)/MCL (ref 4.2–5.4)
RBC #/AREA URNS AUTO: ABNORMAL /HPF
RBC UR QL AUTO: NEGATIVE
RENAL EPI CELLS #/AREA UR COMP ASSIST: ABNORMAL /HPF
SODIUM SERPL-SCNC: 142 MMOL/L (ref 136–145)
SP GR UR STRIP.AUTO: 1.01 (ref 1–1.03)
SQUAMOUS #/AREA URNS LPF: ABNORMAL /HPF
TRIGL SERPL-MCNC: 78 MG/DL (ref 37–140)
UROBILINOGEN UR STRIP-ACNC: NORMAL
VLDLC SERPL CALC-MCNC: 16 MG/DL
WBC # SPEC AUTO: 4.63 X10(3)/MCL (ref 4.5–11.5)
WBC #/AREA URNS AUTO: ABNORMAL /HPF

## 2024-04-22 PROCEDURE — 82570 ASSAY OF URINE CREATININE: CPT

## 2024-04-22 PROCEDURE — 85025 COMPLETE CBC W/AUTO DIFF WBC: CPT

## 2024-04-22 PROCEDURE — 80061 LIPID PANEL: CPT

## 2024-04-22 PROCEDURE — 83036 HEMOGLOBIN GLYCOSYLATED A1C: CPT

## 2024-04-22 PROCEDURE — 81001 URINALYSIS AUTO W/SCOPE: CPT

## 2024-04-22 PROCEDURE — 87086 URINE CULTURE/COLONY COUNT: CPT

## 2024-04-22 PROCEDURE — 87077 CULTURE AEROBIC IDENTIFY: CPT

## 2024-04-22 PROCEDURE — 80053 COMPREHEN METABOLIC PANEL: CPT

## 2024-04-22 PROCEDURE — 36415 COLL VENOUS BLD VENIPUNCTURE: CPT

## 2024-04-22 RX ORDER — LANCETS
EACH MISCELLANEOUS
Qty: 100 EACH | Refills: 11 | Status: SHIPPED | OUTPATIENT
Start: 2024-04-22 | End: 2024-06-05 | Stop reason: SDUPTHER

## 2024-04-22 RX ORDER — INSULIN PUMP SYRINGE, 3 ML
EACH MISCELLANEOUS
Qty: 1 EACH | Refills: 0 | Status: SHIPPED | OUTPATIENT
Start: 2024-04-22 | End: 2024-06-05 | Stop reason: SDUPTHER

## 2024-04-24 ENCOUNTER — OFFICE VISIT (OUTPATIENT)
Dept: NEPHROLOGY | Facility: CLINIC | Age: 74
End: 2024-04-24
Payer: MEDICARE

## 2024-04-24 VITALS
SYSTOLIC BLOOD PRESSURE: 132 MMHG | RESPIRATION RATE: 20 BRPM | WEIGHT: 221.13 LBS | DIASTOLIC BLOOD PRESSURE: 82 MMHG | HEIGHT: 63 IN | TEMPERATURE: 98 F | OXYGEN SATURATION: 99 % | BODY MASS INDEX: 39.18 KG/M2 | HEART RATE: 63 BPM

## 2024-04-24 DIAGNOSIS — N18.31 TYPE 2 DIABETES MELLITUS WITH STAGE 3A CHRONIC KIDNEY DISEASE, WITHOUT LONG-TERM CURRENT USE OF INSULIN: ICD-10-CM

## 2024-04-24 DIAGNOSIS — N18.31 CKD STAGE G3A/A1, GFR 45-59 AND ALBUMIN CREATININE RATIO <30 MG/G: Primary | ICD-10-CM

## 2024-04-24 DIAGNOSIS — I10 PRIMARY HYPERTENSION: ICD-10-CM

## 2024-04-24 DIAGNOSIS — E11.22 TYPE 2 DIABETES MELLITUS WITH STAGE 3A CHRONIC KIDNEY DISEASE, WITHOUT LONG-TERM CURRENT USE OF INSULIN: ICD-10-CM

## 2024-04-24 DIAGNOSIS — E66.01 SEVERE OBESITY (BMI 35.0-39.9) WITH COMORBIDITY: ICD-10-CM

## 2024-04-24 DIAGNOSIS — N39.0 UTI (URINARY TRACT INFECTION), UNCOMPLICATED: ICD-10-CM

## 2024-04-24 LAB
BACTERIA UR CULT: ABNORMAL
BACTERIA UR CULT: ABNORMAL

## 2024-04-24 PROCEDURE — 1159F MED LIST DOCD IN RCRD: CPT | Mod: CPTII,,, | Performed by: NURSE PRACTITIONER

## 2024-04-24 PROCEDURE — 3061F NEG MICROALBUMINURIA REV: CPT | Mod: CPTII,,, | Performed by: NURSE PRACTITIONER

## 2024-04-24 PROCEDURE — 3288F FALL RISK ASSESSMENT DOCD: CPT | Mod: CPTII,,, | Performed by: NURSE PRACTITIONER

## 2024-04-24 PROCEDURE — 1160F RVW MEDS BY RX/DR IN RCRD: CPT | Mod: CPTII,,, | Performed by: NURSE PRACTITIONER

## 2024-04-24 PROCEDURE — 3066F NEPHROPATHY DOC TX: CPT | Mod: CPTII,,, | Performed by: NURSE PRACTITIONER

## 2024-04-24 PROCEDURE — 1101F PT FALLS ASSESS-DOCD LE1/YR: CPT | Mod: CPTII,,, | Performed by: NURSE PRACTITIONER

## 2024-04-24 PROCEDURE — 3044F HG A1C LEVEL LT 7.0%: CPT | Mod: CPTII,,, | Performed by: NURSE PRACTITIONER

## 2024-04-24 PROCEDURE — 3075F SYST BP GE 130 - 139MM HG: CPT | Mod: CPTII,,, | Performed by: NURSE PRACTITIONER

## 2024-04-24 PROCEDURE — 99203 OFFICE O/P NEW LOW 30 MIN: CPT | Mod: S$PBB,,, | Performed by: NURSE PRACTITIONER

## 2024-04-24 PROCEDURE — 3079F DIAST BP 80-89 MM HG: CPT | Mod: CPTII,,, | Performed by: NURSE PRACTITIONER

## 2024-04-24 PROCEDURE — 99215 OFFICE O/P EST HI 40 MIN: CPT | Mod: PBBFAC | Performed by: NURSE PRACTITIONER

## 2024-04-24 PROCEDURE — 1125F AMNT PAIN NOTED PAIN PRSNT: CPT | Mod: CPTII,,, | Performed by: NURSE PRACTITIONER

## 2024-04-24 RX ORDER — MULTIVITAMIN
1 TABLET ORAL DAILY
COMMUNITY

## 2024-04-24 RX ORDER — VITAMIN E 268 MG
400 CAPSULE ORAL DAILY
COMMUNITY

## 2024-04-24 RX ORDER — AMOXICILLIN 500 MG/1
500 TABLET, FILM COATED ORAL EVERY 12 HOURS
Qty: 14 TABLET | Refills: 0 | Status: SHIPPED | OUTPATIENT
Start: 2024-04-24 | End: 2024-04-24

## 2024-04-24 RX ORDER — AMOXICILLIN 500 MG/1
500 TABLET, FILM COATED ORAL EVERY 12 HOURS
Qty: 14 TABLET | Refills: 0 | Status: SHIPPED | OUTPATIENT
Start: 2024-04-24 | End: 2024-05-01

## 2024-04-24 NOTE — PROGRESS NOTES
"[facility]  Nephrology Clinic Note    Chief complaint: Chronic Kidney Disease (New referral)    History of present illness:   Fabiola Mejia is a 73 y.o. Black or  female with past medical history of chronic kidney disease, non-insulin dependent diabetes mellitus type 2 diagnosed greater than 20 years ago, hypertension, fibromyalgia, dyslipidemia, osteopenia, and obesity.  Presents to establish care in Nephrology Clinic today, complains of left knee pain due to arthritis.    Review of Systems  12 point review of systems conducted, negative except as stated in the history of present illness.    Allergies: Patient is allergic to codeine, gabapentin, hydroxychloroquine, methocarbamol, acetaminophen-codeine, and tramadol.     Past Medical History:  has a past medical history of CKD (chronic kidney disease) stage 3, GFR 30-59 ml/min, Connective tissue disorder, Degenerative arthritis of knee, bilateral, Diabetes mellitus due to underlying condition with stage 3 chronic kidney disease, without long-term current use of insulin, unspecified whether stage 3a or 3b CKD, Fibromyalgia, HLD (hyperlipidemia), HTN (hypertension), and Osteopenia.    Procedure History:  has a past surgical history that includes Tonsillectomy.    Family History: family history includes Diabetes in her mother; Heart disease in her mother; Hypertension in her mother; No Known Problems in her father.    Social History:  reports that she has never smoked. She has never used smokeless tobacco. She reports that she does not drink alcohol and does not use drugs.    Physical exam  /82 (BP Location: Left arm, Patient Position: Sitting, BP Method: Large (Manual))   Pulse 63   Temp 97.6 °F (36.4 °C) (Oral)   Resp 20   Ht 5' 3" (1.6 m)   Wt 100.3 kg (221 lb 1.9 oz)   SpO2 99%   BMI 39.17 kg/m²   General appearance: Patient is in no acute distress.  Skin: No rashes or wounds.  HEENT: PERRLA, EOMI, no scleral " icterus, no JVD. Neck is supple.  Chest: Respirations are unlabored. Lungs sounds are clear.   Heart: S1, S2.   Abdomen: Benign.  : Deferred.  Extremities: No edema, peripheral pulses are palpable.   Neuro: No focal deficits.     Home Medications:    Current Outpatient Medications:     blood sugar diagnostic Strp, To check BG 1 times daily, to use with insurance preferred meter, Disp: 100 strip, Rfl: 11    blood-glucose meter kit, To check BG 1 times daily, to use with insurance preferred meter, Disp: 1 each, Rfl: 0    dapagliflozin propanediol (FARXIGA) 5 mg Tab tablet, Take 1 tablet (5 mg total) by mouth once daily., Disp: 90 tablet, Rfl: 1    desonide (DESOWEN) 0.05 % cream, , Disp: , Rfl:     ergocalciferol (ERGOCALCIFEROL) 50,000 unit Cap, Take 50,000 Units by mouth every 7 days., Disp: , Rfl:     glipiZIDE (GLUCOTROL) 5 MG tablet, Take 1 tab po daily., Disp: 90 tablet, Rfl: 1    lancets Misc, To check BG 1 times daily, to use with insurance preferred meter, Disp: 100 each, Rfl: 11    multivitamin (THERAGRAN) per tablet, Take 1 tablet by mouth once daily., Disp: , Rfl:     pantoprazole (PROTONIX) 40 MG tablet, Take 40 mg by mouth once daily., Disp: , Rfl:     pioglitazone (ACTOS) 15 MG tablet, Take 1 tab po daily., Disp: 90 tablet, Rfl: 1    pravastatin (PRAVACHOL) 40 MG tablet, Take 1 tab po Q hs., Disp: 90 tablet, Rfl: 1    spironolactone (ALDACTONE) 25 MG tablet, Take 1 tab po daily., Disp: 90 tablet, Rfl: 1    UNABLE TO FIND, Take 1 capsule by mouth once daily. medication name: tumeric curcumin with adrian powder, Disp: , Rfl:     VITAMIN A ORAL, Take 2,400 mcg by mouth Daily., Disp: , Rfl:     vitamin E 400 UNIT capsule, Take 400 Units by mouth once daily., Disp: , Rfl:     amoxicillin (AMOXIL) 500 MG Tab, Take 1 tablet (500 mg total) by mouth every 12 (twelve) hours. for 7 days, Disp: 14 tablet, Rfl: 0    clobetasoL (TEMOVATE) 0.05 % cream, , Disp: , Rfl:     diclofenac sodium (VOLTAREN) 1 % Gel, Apply  4 g topically 4 (four) times daily as needed (pain). (Patient not taking: Reported on 4/24/2024), Disp: 450 g, Rfl: 4    Laboratory data    Lab Results   Component Value Date    WBC 4.63 04/22/2024    HGB 13.1 04/22/2024    HCT 40.2 04/22/2024     04/22/2024     04/22/2024    K 4.2 04/22/2024    CHLORIDE 107 04/22/2024    CO2 29 04/22/2024    BUN 19.5 04/22/2024    CREATININE 1.15 (H) 04/22/2024    EGFRNORACEVR 50 04/22/2024    GLUCOSE 114 04/22/2024    CALCIUM 10.0 04/22/2024    ALKPHOS 61 04/22/2024    LABPROT 7.7 (H) 04/22/2024    ALBUMIN 3.7 04/22/2024    BILIDIR 0.3 02/21/2023    IBILI 0.40 02/21/2023    AST 21 04/22/2024    ALT 15 04/22/2024      Lab Results   Component Value Date    HGBA1C 6.3 04/22/2024    QMJQZYTR00CN 54.5 02/29/2024    HEPCAB Nonreactive 07/22/2021    HEPBSURFAG Nonreactive 07/22/2021     Urine:  Lab Results   Component Value Date    COLORUA Colorless (A) 04/22/2024    APPEARANCEUA Clear 04/22/2024    SGUA 1.013 04/22/2024    PHUA 6.0 04/22/2024    PROTEINUA Negative 04/22/2024    GLUCOSEUA 3+ (A) 04/22/2024    KETONESUA Negative 04/22/2024    BLOODUA Negative 04/22/2024    NITRITESUA Negative 04/22/2024    LEUKOCYTESUR 250 (A) 04/22/2024    RBCUA 0-5 04/22/2024    WBCUA 11-20 (A) 04/22/2024    BACTERIA Trace (A) 04/22/2024    SQEPUA Occ (A) 04/22/2024    HYALINECASTS None Seen 04/22/2024    CREATRANDUR 46.5 04/22/2024    PROTEINURINE <6.8 04/22/2024         Imaging  Ultrasound of the abdomen 11/29/2023:  FINDINGS: EXAM IS LIMITED DUE TO PATIENT'S BODY HABITUS  Liver:  Size: 17.9 cm in the right midclavicular line, normal  Appearance: There is increased echogenicity of the liver parenchyma increased echogenicity decreases sensitivity to detect focal lesions  Mass: No focal masses  Gallbladder:  Stones/Sludge: None  Appearance: No wall thickening, pericholecystic fluid or hydrops  Sonographic Pan's Sign: Negative  Bile Ducts:  Intrahepatic Ducts: No dilatation  Extrahepatic  Ducts: Common bile duct measures 0.20 cm, no dilatation  Pancreas:   Visualized portions of the pancreas are normal.   Both kidneys are of normal size shape and contour with no abnormal masses or hydronephrosis.  Subcentimeter cyst identified in the right kidney measuring 7 mm x 8 mm x 7 mm   Spleen is unremarkable   Vessels:  Aorta: Visualized portions are normal.   Inferior Vena Cava: Visualized portions are normal.   Main Portal Vein: Patent with hepatopedal  flow.   Free Fluid:  No ascites or pleural effusions  Impression:   Limited exam due to patient's body habitus.   Hepatic steatosis.   Cyst of the right kidney     Impression    ICD-10-CM ICD-9-CM   1. CKD stage G3a/A1, GFR 45-59 and albumin creatinine ratio <30 mg/g  N18.31 585.3   2. Primary hypertension  I10 401.9   3. Type 2 diabetes mellitus with stage 3a chronic kidney disease, without long-term current use of insulin  E11.22 250.40    N18.31 585.3   4. UTI (urinary tract infection), uncomplicated  N39.0 599.0   5. Severe obesity (BMI 35.0-39.9) with comorbidity  E66.01 278.01        Plan  CKD stage G3a/A1, GFR 45-59 and albumin creatinine ratio <30 mg/g  -     Ambulatory referral/consult to Nephrology  -     Comprehensive Metabolic Panel; Future; Expected date: 10/04/2024  -     Protein/Creatinine Ratio, Urine; Future; Expected date: 10/04/2024  -     Urinalysis, Reflex to Urine Culture; Future; Expected date: 10/04/2024  -     PTH, Intact; Future; Expected date: 10/04/2024  Diabetic kidney disease.  Imaging of the kidneys was unremarkable.  There is no significant proteinuria.  Continue risk factor management, MRA and SGLT2i.     Primary hypertension  Blood pressure reading is at goal, continue current antihypertensive regimen and 2 g a day dietary sodium restriction.      Type 2 diabetes mellitus with stage 3a chronic kidney disease, without long-term current use of insulin  A1C is at goal. Continue MRA and SGLT2i.     UTI (urinary tract infection),  uncomplicated  -     amoxicillin (AMOXIL) 500 MG Tab; Take 1 tablet (500 mg total) by mouth every 12 (twelve) hours. for 7 days  Dispense: 14 tablet; Refill: 0  Will treat with amoxicillin.     Severe obesity (BMI 35.0-39.9) with comorbidity  Lifestyle and dietary interventions discussed, patient encouraged to maintain non-sedentary lifestyle and well-balanced diet.     Follow up in about 6 months (around 10/24/2024).     4/24/2024  Shikha Ramirez NP  [facility] Nephrology

## 2024-05-03 ENCOUNTER — TELEPHONE (OUTPATIENT)
Dept: INTERNAL MEDICINE | Facility: CLINIC | Age: 74
End: 2024-05-03
Payer: MEDICARE

## 2024-05-03 NOTE — TELEPHONE ENCOUNTER
Unable to contact patient or her sister at number provided, unable to leave message as voice mail is full.

## 2024-05-03 NOTE — TELEPHONE ENCOUNTER
----- Message from Floresita Robb sent at 5/1/2024 10:00 AM CDT -----  .Type:  Needs Medical Advice    Who Called: pt sister   Symptoms (please be specific):    How long has patient had these symptoms:    Pharmacy name and phone #:    Would the patient rather a call back or a response via [facility]? Call back   Best Call Back Number: [phone]  Additional Information: pt needs referral sent to Dr Sanchez for her shoes

## 2024-05-03 NOTE — TELEPHONE ENCOUNTER
Informed patient I spoke with a representative at the medical supply company at [phone]. Representative informed me they showed her two different types of rollators and patient refused both of them. Patient informed me she does not want her rollator to come from that medical supply company. I informed the patient when she gets the number of the company let me know. Patient verbalized understanding of information given to her today.

## 2024-05-14 ENCOUNTER — TELEPHONE (OUTPATIENT)
Dept: INTERNAL MEDICINE | Facility: CLINIC | Age: 74
End: 2024-05-14
Payer: MEDICARE

## 2024-05-15 NOTE — TELEPHONE ENCOUNTER
----- Message from Louise Soria sent at 5/14/2024  2:32 PM CDT -----  Regarding: orders  Who Called: Fabiola Mejia    Caller is requesting assistance/information from provider's office.    Symptoms (please be specific):      How long has patient had these symptoms:      List of preferred pharmacies on file (remove unneeded): [unfilled]  If different, enter pharmacy into here including location and phone number:         Preferred Method of Contact: Phone Call  Patient's Preferred Phone Number on File: [phone]   Best Call Back Number, if different:  Additional Information: pt is requesting to speak with Silke; she states that she needs information on the orders for both her rollator walker and diabetic shoes; she states that she has not received a call regarding the status and she needs the shoes for her physical; please advise. Thanks.

## 2024-05-16 ENCOUNTER — TELEPHONE (OUTPATIENT)
Dept: INTERNAL MEDICINE | Facility: CLINIC | Age: 74
End: 2024-05-16
Payer: MEDICARE

## 2024-05-16 NOTE — TELEPHONE ENCOUNTER
----- Message from Louise Soria sent at 5/16/2024 12:20 PM CDT -----  Regarding: diabetic shoes  .Type:  Needs Medical Advice    Who Called: pt    Symptoms (please be specific):      How long has patient had these symptoms:      Pharmacy name and phone #:      Would the patient rather a call back or a response via [facility]?     Best Call Back Number: [phone]    Additional Information: pt called to request a signature needed from the provider for diabetic shoes; prescription can be faxed over, but a physican's signature is required; please contact pt with an update as soon as possible; thanks.     Dr. Laura Walker   Phone # - [phone]                  Fax # - [phone]

## 2024-05-16 NOTE — TELEPHONE ENCOUNTER
[employer] needed for DM [facility]. Order placed in folder for you to sign

## 2024-05-17 ENCOUNTER — TELEPHONE (OUTPATIENT)
Dept: INTERNAL MEDICINE | Facility: CLINIC | Age: 74
End: 2024-05-17
Payer: MEDICARE

## 2024-05-17 NOTE — TELEPHONE ENCOUNTER
Anesthesia Pre Eval Note    Anesthesia ROS/Med Hx        Anesthetic Complication History:  Patient does not have a history of anesthetic complications  Comment: 08/24/20; 0742; MAC size 3; ETT; II; 7; Cuffed; Air; Capnometer; 1; 08/24/20; 1733; Anesthesiologist; Easy; Placed Without Trauma    Pulmonary Review:  Patient does not have a pulmonary history      Neuro/Psych Review:  Patient does not have a neuro/psych history       Cardiovascular Review:  Exercise tolerance: good (>4 METS)  Positive for hypertension    GI/HEPATIC/RENAL Review:  Patient does not have a GI/hepatic/renalhistory       End/Other Review:    Positive for obesity   Positive for cancer  Additional Results:     ALLERGIES:  No Known Allergies       No results found for: WBC, RBC, HGB, HCT, MCV, MCH, MCHC, RDWCV, SODIUM, POTASSIUM, CHLORIDE, CO2, GLUCOSE, BUN, CREATININE, GFRESTIMATE, EGFRNONAFR, GFRA, GFRNA, CALCIUM, HCG, PLT, PTT, INR   Past Medical History:  No date: Breast cancer (CMS/HCC)      Comment:  left  01/13/2016: Colon cancer screening  No date: Hypertension  No date: Obesity    Past Surgical History:  08/24/2020: Breast surgery; Bilateral      Comment:  reconstruction  01/13/2016: Colonoscopy      Comment:  1 sessile adenoma, 1 hyperplastic polyp, recall 3 yrs  No date: Dental surgery      Comment:  Local anesthesia  09/17/2018: Mastectomy modified radical; Bilateral       Prior to Admission medications :  Medication anastrozole (ARIMIDEX) 1 MG tablet, Sig TAKE 1 TABLET(1 MG) BY MOUTH DAILY, Start Date 6/20/22, End Date , Taking? Yes, Authorizing Provider Outside Provider    Medication gabapentin (NEURONTIN) 300 MG capsule, Sig Take 300 mg by mouth 2 (two) times a day., Start Date 7/30/22, End Date , Taking? Yes, Authorizing Provider Outside Provider    Medication acetaminophen (Tylenol 8 Hour) 650 MG CR tablet, Sig Take 1 tablet by mouth every 6 hours as needed for Pain., Start Date 1/11/21, End Date , Taking? Yes, Authorizing  Spoke to PCP and pt has been offered 2 rollators and declined both, pt informed she will have to take one of the rollators that was offered. Pt states she doesn't want those rollators and has one in mind. I told her she will need to speak with Cyndee and accept one of those as those are what insurance pays for. They dont allow her to pick and choose. Pt advised our hands were tied and she would need to contact insurance or [facility] about a rollator.   Provider Traci Billy PA-C    Medication lisinopril-hydrochlorothiazide (PRINZIDE,ZESTORETIC) 20-25 MG per tablet, Sig Take 1 tablet by mouth daily. Indications: High Blood Pressure, Start Date , End Date , Taking? Yes, Authorizing Provider Outside Provider    Medication Probiotic Product (PROBIOTIC PO), Sig Take 1 capsule by mouth daily., Start Date , End Date , Taking? , Authorizing Provider Outside Provider    Medication electrolyte/PEG 3350 (Nulytely with Flavor Packs) 420 g solution, Sig Take 4,000 mLs by mouth 1 time., Start Date 7/28/22, End Date , Taking? , Authorizing Provider Milan Matta MD    Medication bacitracin 500 UNIT/GM ointment, Sig Apply topically 2 times daily., Start Date 1/11/21, End Date , Taking? , Authorizing Provider Traci Billy PA-C         Patient Vitals in the past 24 hrs:      Relevant Problems   No relevant active problems       Physical Exam     Airway   Mallampati: II  TM Distance: >3 FB  Neck ROM: Full  TMJ Mobility: Good    Cardiovascular  Cardiovascular exam normal    Head Assessment  Head assessment: Atraumatic and Normocephalic    General Assessment  General Assessment: Alert and oriented and No acute distress    Dental Exam  Dental exam normal    Pulmonary Exam  Pulmonary exam normal    Abdominal Exam    Patient Demonstrates:  Obese      Anesthesia Plan:    ASA Status: 2  Anesthesia Type: MAC    Induction: Intravenous  Preferred Airway Type: Mask  Maintenance: TIVA  Premedication: None      Post-op Pain Management: Per Surgeon      Checklist  Reviewed: Lab Results, Consultations, NPO Status, Problem list, Medications, Allergies and Past Med History  Consent/Risks Discussed Statement:  The proposed anesthetic plan, including its risks and benefits, have been discussed with the Patient along with the risks and benefits of alternatives. Questions were encouraged and answered and the patient and/or representative understands and agrees to proceed.        I discussed with  the patient (and/or patient's legal representative) the risks and benefits of the proposed anesthesia plan, MAC, which may include services performed by other anesthesia providers.    Alternative anesthesia plans, if available, were reviewed with the patient (and/or patient's legal representative). Discussion has been held with the patient (and/or patient's legal representative) regarding risks of anesthesia, which include allergic reaction, anxiety, aspiration, nausea, headache, hypotension, sore throat, vomiting, memory loss, emergence delirium, intra-operative awareness and depressed breathing and emergent situations that may require change in anesthesia plan.    The patient (and/or patient's legal representative) has indicated understanding, his/her questions have been answered, and he/she wishes to proceed with the planned anesthetic.    Blood Products: Not Anticipated    Comments  Plan Comments:  present

## 2024-05-17 NOTE — TELEPHONE ENCOUNTER
----- Message from Louise Yang sent at 5/16/2024 12:34 PM CDT -----  Regarding: walker  Who Called: Fabiola Mejia    Refill or New Rx:New Rx  What supplies are needed:rollator walker    What is the brand of the supplies:n/a    If checking glucose, how many times do they check it?:n/a    Who prescribed the original supplies:n/a    List of preferred pharmacies on file (remove uneeded): [unfilled]  If different, enter Pharmacy information here including location and phone number:      What Medical Supply company is the patient requesting?: any within network    Preferred Method of Contact: Phone Call  Patient's Preferred Phone Number on File: [phone]   Best Call Back Number, if different:  Additional Information: pt is requesting a walker with a seat; please advise; she states that she has called several times and has never received a callback regarding the walker; thanks.

## 2024-05-21 ENCOUNTER — TELEPHONE (OUTPATIENT)
Dept: INTERNAL MEDICINE | Facility: CLINIC | Age: 74
End: 2024-05-21
Payer: MEDICARE

## 2024-05-21 NOTE — TELEPHONE ENCOUNTER
----- Message from Louise Soria sent at 5/20/2024  1:34 PM CDT -----  Regarding: walker order  Type:  Needs Medical Advice    Who Called: Mala Magaña    Symptoms (please be specific):      How long has patient had these symptoms:      Pharmacy name and phone #:      Would the patient rather a call back or a response via [facility]?     Best Call Back Number: [phone]  Additional Information: insurance company called to request that the order for the walker, as well as the clinical notes be faxed over asap; please advise. Thanks.

## 2024-06-04 DIAGNOSIS — E11.22 TYPE 2 DIABETES MELLITUS WITH STAGE 3B CHRONIC KIDNEY DISEASE, WITHOUT LONG-TERM CURRENT USE OF INSULIN: Primary | ICD-10-CM

## 2024-06-04 DIAGNOSIS — N18.32 TYPE 2 DIABETES MELLITUS WITH STAGE 3B CHRONIC KIDNEY DISEASE, WITHOUT LONG-TERM CURRENT USE OF INSULIN: Primary | ICD-10-CM

## 2024-06-05 ENCOUNTER — TELEPHONE (OUTPATIENT)
Dept: INTERNAL MEDICINE | Facility: CLINIC | Age: 74
End: 2024-06-05

## 2024-06-05 ENCOUNTER — LAB VISIT (OUTPATIENT)
Dept: LAB | Facility: HOSPITAL | Age: 74
End: 2024-06-05
Attending: NURSE PRACTITIONER
Payer: MEDICARE

## 2024-06-05 ENCOUNTER — OFFICE VISIT (OUTPATIENT)
Dept: INTERNAL MEDICINE | Facility: CLINIC | Age: 74
End: 2024-06-05
Payer: MEDICARE

## 2024-06-05 VITALS
HEART RATE: 68 BPM | WEIGHT: 226 LBS | DIASTOLIC BLOOD PRESSURE: 75 MMHG | RESPIRATION RATE: 18 BRPM | TEMPERATURE: 98 F | SYSTOLIC BLOOD PRESSURE: 120 MMHG | HEIGHT: 63 IN | BODY MASS INDEX: 40.04 KG/M2

## 2024-06-05 DIAGNOSIS — N18.32 TYPE 2 DIABETES MELLITUS WITH STAGE 3B CHRONIC KIDNEY DISEASE, WITHOUT LONG-TERM CURRENT USE OF INSULIN: ICD-10-CM

## 2024-06-05 DIAGNOSIS — E11.22 TYPE 2 DIABETES MELLITUS WITH STAGE 3B CHRONIC KIDNEY DISEASE, WITHOUT LONG-TERM CURRENT USE OF INSULIN: ICD-10-CM

## 2024-06-05 DIAGNOSIS — E66.9 OBESITY, UNSPECIFIED CLASSIFICATION, UNSPECIFIED OBESITY TYPE, UNSPECIFIED WHETHER SERIOUS COMORBIDITY PRESENT: ICD-10-CM

## 2024-06-05 DIAGNOSIS — M35.9 CONNECTIVE TISSUE DISORDER: ICD-10-CM

## 2024-06-05 DIAGNOSIS — M79.7 FIBROMYALGIA: Primary | ICD-10-CM

## 2024-06-05 DIAGNOSIS — M85.851 OSTEOPENIA OF NECKS OF BOTH FEMURS: ICD-10-CM

## 2024-06-05 DIAGNOSIS — N18.30 CONTROLLED TYPE 2 DIABETES MELLITUS WITH STAGE 3 CHRONIC KIDNEY DISEASE, WITHOUT LONG-TERM CURRENT USE OF INSULIN: ICD-10-CM

## 2024-06-05 DIAGNOSIS — R79.89 ELEVATED SERUM CREATININE: ICD-10-CM

## 2024-06-05 DIAGNOSIS — E11.22 CONTROLLED TYPE 2 DIABETES MELLITUS WITH STAGE 3 CHRONIC KIDNEY DISEASE, WITHOUT LONG-TERM CURRENT USE OF INSULIN: ICD-10-CM

## 2024-06-05 DIAGNOSIS — M85.852 OSTEOPENIA OF NECKS OF BOTH FEMURS: ICD-10-CM

## 2024-06-05 DIAGNOSIS — E78.2 MIXED HYPERLIPIDEMIA: ICD-10-CM

## 2024-06-05 DIAGNOSIS — Z78.0 MENOPAUSE: ICD-10-CM

## 2024-06-05 DIAGNOSIS — Z12.31 BREAST CANCER SCREENING BY MAMMOGRAM: ICD-10-CM

## 2024-06-05 DIAGNOSIS — Z00.00 WELL ADULT EXAM: ICD-10-CM

## 2024-06-05 DIAGNOSIS — M17.0 PRIMARY OSTEOARTHRITIS OF BOTH KNEES: ICD-10-CM

## 2024-06-05 LAB
ANION GAP SERPL CALC-SCNC: 6 MEQ/L
BUN SERPL-MCNC: 22.7 MG/DL (ref 9.8–20.1)
CALCIUM SERPL-MCNC: 10 MG/DL (ref 8.4–10.2)
CHLORIDE SERPL-SCNC: 107 MMOL/L (ref 98–107)
CO2 SERPL-SCNC: 28 MMOL/L (ref 23–31)
CREAT SERPL-MCNC: 1.38 MG/DL (ref 0.55–1.02)
CREAT/UREA NIT SERPL: 16
GFR SERPLBLD CREATININE-BSD FMLA CKD-EPI: 40 ML/MIN/1.73/M2
GLUCOSE SERPL-MCNC: 123 MG/DL (ref 82–115)
POTASSIUM SERPL-SCNC: 4.7 MMOL/L (ref 3.5–5.1)
SODIUM SERPL-SCNC: 141 MMOL/L (ref 136–145)

## 2024-06-05 PROCEDURE — 1101F PT FALLS ASSESS-DOCD LE1/YR: CPT | Mod: CPTII,,, | Performed by: NURSE PRACTITIONER

## 2024-06-05 PROCEDURE — 3288F FALL RISK ASSESSMENT DOCD: CPT | Mod: CPTII,,, | Performed by: NURSE PRACTITIONER

## 2024-06-05 PROCEDURE — 3066F NEPHROPATHY DOC TX: CPT | Mod: CPTII,,, | Performed by: NURSE PRACTITIONER

## 2024-06-05 PROCEDURE — 3061F NEG MICROALBUMINURIA REV: CPT | Mod: CPTII,,, | Performed by: NURSE PRACTITIONER

## 2024-06-05 PROCEDURE — 3078F DIAST BP <80 MM HG: CPT | Mod: CPTII,,, | Performed by: NURSE PRACTITIONER

## 2024-06-05 PROCEDURE — 36415 COLL VENOUS BLD VENIPUNCTURE: CPT

## 2024-06-05 PROCEDURE — 1159F MED LIST DOCD IN RCRD: CPT | Mod: CPTII,,, | Performed by: NURSE PRACTITIONER

## 2024-06-05 PROCEDURE — 3044F HG A1C LEVEL LT 7.0%: CPT | Mod: CPTII,,, | Performed by: NURSE PRACTITIONER

## 2024-06-05 PROCEDURE — 1126F AMNT PAIN NOTED NONE PRSNT: CPT | Mod: CPTII,,, | Performed by: NURSE PRACTITIONER

## 2024-06-05 PROCEDURE — 3074F SYST BP LT 130 MM HG: CPT | Mod: CPTII,,, | Performed by: NURSE PRACTITIONER

## 2024-06-05 PROCEDURE — 99215 OFFICE O/P EST HI 40 MIN: CPT | Mod: PBBFAC | Performed by: NURSE PRACTITIONER

## 2024-06-05 PROCEDURE — 80048 BASIC METABOLIC PNL TOTAL CA: CPT

## 2024-06-05 PROCEDURE — 99214 OFFICE O/P EST MOD 30 MIN: CPT | Mod: S$PBB,,, | Performed by: NURSE PRACTITIONER

## 2024-06-05 PROCEDURE — 1160F RVW MEDS BY RX/DR IN RCRD: CPT | Mod: CPTII,,, | Performed by: NURSE PRACTITIONER

## 2024-06-05 RX ORDER — METHYLPREDNISOLONE 4 MG/1
TABLET ORAL
Qty: 21 EACH | Refills: 0 | Status: SHIPPED | OUTPATIENT
Start: 2024-06-05

## 2024-06-05 RX ORDER — LANCETS
EACH MISCELLANEOUS
Qty: 100 EACH | Refills: 11 | Status: SHIPPED | OUTPATIENT
Start: 2024-06-05

## 2024-06-05 RX ORDER — SPIRONOLACTONE 25 MG/1
TABLET ORAL
Qty: 90 TABLET | Refills: 1 | Status: SHIPPED | OUTPATIENT
Start: 2024-06-05

## 2024-06-05 RX ORDER — INSULIN PUMP SYRINGE, 3 ML
EACH MISCELLANEOUS
Qty: 1 EACH | Refills: 0 | Status: SHIPPED | OUTPATIENT
Start: 2024-06-05

## 2024-06-05 RX ORDER — GLIPIZIDE 5 MG/1
TABLET ORAL
Qty: 90 TABLET | Refills: 1 | Status: SHIPPED | OUTPATIENT
Start: 2024-06-05

## 2024-06-05 RX ORDER — DAPAGLIFLOZIN 5 MG/1
5 TABLET, FILM COATED ORAL DAILY
Qty: 90 TABLET | Refills: 1 | Status: SHIPPED | OUTPATIENT
Start: 2024-06-05

## 2024-06-05 RX ORDER — PIOGLITAZONEHYDROCHLORIDE 15 MG/1
TABLET ORAL
Qty: 90 TABLET | Refills: 1 | Status: SHIPPED | OUTPATIENT
Start: 2024-06-05

## 2024-06-05 RX ORDER — PRAVASTATIN SODIUM 40 MG/1
TABLET ORAL
Qty: 90 TABLET | Refills: 1 | Status: SHIPPED | OUTPATIENT
Start: 2024-06-05

## 2024-06-05 NOTE — PROGRESS NOTES
"   Patient ID: [id number]     Chief Complaint: LAB RESULTS        HPI:     HPI      Fabiola Mejia is a 73 y.o. female here today for a follow up. Pt c/o left knee pain and swelling.           -------------------------------------    CKD (chronic kidney disease) stage 3, GFR 30-59 ml/min    Connective tissue disorder    Degenerative arthritis of knee, bilateral    Diabetes mellitus due to underlying condition with stage 3 chronic kidney disease, without long-term current use of insulin, unspecified whether stage 3a or 3b CKD    Fibromyalgia    HLD (hyperlipidemia)    HTN (hypertension)    Osteopenia        Past Surgical History:   Procedure Laterality Date    TONSILLECTOMY         Review of patient's allergies indicates:   Allergen Reactions    Codeine Other (See Comments)    Gabapentin     Hydroxychloroquine      Other Reaction(s): Loose bowel movement    Methocarbamol      Other Reaction(s): makes her sleepy and feel "funny"    Acetaminophen-codeine Other (See Comments)    Tramadol Rash       Current Outpatient Medications   Medication Instructions    blood sugar diagnostic Strp To check BG 1 times daily, to use with insurance preferred meter    blood-glucose meter kit To check BG 1 times daily, to use with insurance preferred meter    clobetasoL (TEMOVATE) 0.05 % cream     dapagliflozin propanediol (FARXIGA) 5 mg, Oral, Daily    desonide (DESOWEN) 0.05 % cream     diclofenac sodium (VOLTAREN) 4 g, Topical (Top), 4 times daily PRN    ergocalciferol (ERGOCALCIFEROL) 50,000 Units, Oral, Every 7 days    glipiZIDE (GLUCOTROL) 5 MG tablet Take 1 tab po daily.    lancets Misc To check BG 1 times daily, to use with insurance preferred meter    methylPREDNISolone (MEDROL DOSEPACK) 4 mg tablet use as directed    multivitamin (THERAGRAN) per tablet 1 tablet, Oral, Daily    pantoprazole (PROTONIX) 40 mg, Oral, Daily    pioglitazone (ACTOS) 15 MG tablet Take 1 tab po daily.    pravastatin (PRAVACHOL) 40 MG tablet Take 1 tab po Q " hs.    spironolactone (ALDACTONE) 25 MG tablet Take 1 tab po daily.    UNABLE TO FIND 1 capsule, Oral, Daily, medication name: tumeric curcumin with adrian powder    VITAMIN A ORAL 2,400 mcg, Oral, Daily    vitamin E 400 Units, Oral, Daily       Social History     Socioeconomic History    Marital status:    Tobacco Use    Smoking status: Never    Smokeless tobacco: Never   Substance and Sexual Activity    Alcohol use: Never    Drug use: Never    Sexual activity: Not Currently     Social Determinants of Health     Financial Resource Strain: Low Risk  (1/31/2024)    Overall Financial Resource Strain (CARDIA)     Difficulty of Paying Living Expenses: Not hard at all   Food Insecurity: No Food Insecurity (1/31/2024)    Hunger Vital Sign     Worried About Running Out of Food in the Last Year: Never true     Ran Out of Food in the Last Year: Never true   Transportation Needs: No Transportation Needs (1/31/2024)    PRAPARE - Transportation     Lack of Transportation (Medical): No     Lack of Transportation (Non-Medical): No   Physical Activity: Inactive (1/31/2024)    Exercise Vital Sign     Days of Exercise per Week: 0 days     Minutes of Exercise per Session: 0 min   Stress: No Stress Concern Present (1/31/2024)    [employer] - Occupational Stress Questionnaire     Feeling of Stress : Not at all   Housing Stability: Low Risk  (1/31/2024)    Housing Stability Vital Sign     Unable to Pay for Housing in the Last Year: No     Number of Places Lived in the Last Year: 1     Unstable Housing in the Last Year: No        Family History   Problem Relation Name Age of Onset    Hypertension Mother      Diabetes Mother      Heart disease Mother      No Known Problems Father          Patient Care Team:  Mariama Rios FNP as PCP - General (Family Medicine)     Subjective:     Review of Systems     See HPI for details    Constitutional: Denies Change in appetite. Denies Chills. Denies Fever.  "Denies Night sweats.  Eye: Denies Blurred vision. Denies Discharge. Denies Eye pain.  ENT: Denies Decreased hearing. Denies Sore throat. Denies Swollen glands.  Respiratory: Denies Cough. Denies Shortness of breath. Denies Shortness of breath with exertion. Denies Wheezing.  Cardiovascular: DeniesChest pain at rest. Denies Chest pain with exertion. Denies Irregular heartbeat. Denies Palpitations. Denies Edema.  Gastrointestinal: Denies Abdominal pain. DeniesDiarrhea. Denies Nausea. Denies Vomiting. Denies Hematemesis or Hematochezia.  Genitourinary: Denies Dysuria. Denies Urinary frequency. Denies Urinary urgency. Denies Blood in urine.  Endocrine: Denies Cold intolerance. Denies Excessive thirst. Denies Heat intolerance. Denies Weight loss. Denies Weight gain.  Musculoskeletal: Admits Painful joints. Denies Weakness.  Integumentary: Denies Rash. Denies Itching. Denies Dry skin.  Neurologic: Denies Dizziness. Denies Fainting. Denies Headache.  Psychiatric: Denies Depression. Denies Anxiety. Denies Suicidal/Homicidal ideations.    All Other ROS: Negative except as stated in HPI.       Objective:     Visit Vitals  /75 (BP Location: Left arm, Patient Position: Sitting, BP Method: Large (Automatic))   Pulse 68   Temp 97.8 °F (36.6 °C) (Oral)   Resp 18   Ht 5' 3" (1.6 m)   Wt 102.5 kg (226 lb)   BMI 40.03 kg/m²       Physical Exam    General: Alert and oriented, No acute distress.  Head: Normocephalic, Atraumatic.  Eye: Pupils are equal, round and reactive to light, Extraocular movements are intact, Sclera non-icteric.  Ears/Nose/Throat: Normal, Mucosa moist,Clear.  Neck/Thyroid: Supple, Non-tender, No carotid bruit, No lymphadenopathy, No JVD, Full range of motion.  Respiratory: Clear to auscultation bilaterally; No wheezes, rales or rhonchi,Non-labored respirations, Symmetrical chest wall expansion.  Cardiovascular: Regular rate and rhythm, S1/S2 normal, No murmurs, rubs or gallops.  Gastrointestinal: Soft, " Non-tender, Non-distended, Normal bowel sounds, No palpable organomegaly.  Musculoskeletal: Normal range of motion.  Integumentary: Warm, Dry, Intact, No suspicious lesions or rashes.  Extremities: No clubbing, cyanosis or edema  Neurologic: No focal deficits, Cranial Nerves II-XII are grossly intact, Motor strength normal upper and lower extremities, Sensory exam intact.  Psychiatric: Normal interaction, Coherent speech, Euthymic mood, Appropriate affect       Labs Reviewed:     Chemistry:  Lab Results   Component Value Date     06/05/2024    K 4.7 06/05/2024    BUN 22.7 (H) 06/05/2024    CREATININE 1.38 (H) 06/05/2024    EGFRNORACEVR 40 06/05/2024    GLUCOSE 123 (H) 06/05/2024    CALCIUM 10.0 06/05/2024    ALKPHOS 61 04/22/2024    LABPROT 7.7 (H) 04/22/2024    ALBUMIN 3.7 04/22/2024    BILIDIR 0.3 02/21/2023    IBILI 0.40 02/21/2023    AST 21 04/22/2024    ALT 15 04/22/2024    UAWHGHVH39MH 54.5 02/29/2024        Lab Results   Component Value Date    HGBA1C 6.3 04/22/2024        Hematology:  Lab Results   Component Value Date    WBC 4.63 04/22/2024    HGB 13.1 04/22/2024    HCT 40.2 04/22/2024     04/22/2024       Lipid Panel:  Lab Results   Component Value Date    CHOL 203 (H) 04/22/2024    HDL 55 04/22/2024    .00 04/22/2024    TRIG 78 04/22/2024    TOTALCHOLEST 4 04/22/2024        Urine:  Lab Results   Component Value Date    APPEARANCEUA Clear 04/22/2024    SGUA 1.013 04/22/2024    PROTEINUA Negative 04/22/2024    KETONESUA Negative 04/22/2024    LEUKOCYTESUR 250 (A) 04/22/2024    RBCUA 0-5 04/22/2024    WBCUA 11-20 (A) 04/22/2024    BACTERIA Trace (A) 04/22/2024    SQEPUA Occ (A) 04/22/2024    HYALINECASTS None Seen 04/22/2024    CREATRANDUR 46.5 04/22/2024    PROTEINURINE <6.8 04/22/2024        Assessment:       ICD-10-CM ICD-9-CM   1. Fibromyalgia  M79.7 729.1   2. Connective tissue disorder  M35.9 710.9   3. Mixed hyperlipidemia  E78.2 272.2   4. Elevated serum creatinine  R79.89 790.99    5. Type 2 diabetes mellitus with stage 3b chronic kidney disease, without long-term current use of insulin  E11.22 250.40    N18.32 585.3   6. Obesity, unspecified classification, unspecified obesity type, unspecified whether serious comorbidity present  E66.9 278.00   7. Breast cancer screening by mammogram  Z12.31 V76.12   8. Well adult exam  Z00.00 V70.0   9. Primary osteoarthritis of both knees  M17.0 715.16   10. Osteopenia of necks of both femurs  M85.851 733.90    M85.852    11. Menopause  Z78.0 627.2   12. Controlled type 2 diabetes mellitus with stage 3 chronic kidney disease, without long-term current use of insulin  E11.22 250.40    N18.30 585.3        Plan:     1. Fibromyalgia  Pt referred to Rheum cl for further management. Pt has appt scheduled 6-16-25. Keep appt.     2. Connective tissue disorder  Pt referred to Rheum cl for further management. Pt has appt scheduled 6-16-25. Keep appt.     3. Mixed hyperlipidemia  Chol 203 down from 219. Improvement noted. Low fat diet and exercise.  Cont Pravastatin as prescribed.     4. Elevated serum creatinine  Creat 1.38. Low protein low salt diet, avoid NSAIDs, drink plenty of water, maintain good BP and CBG levels to prevent worsening of symptoms. Pt admitting to taking BC powder due to knee pain. Informed pt to use Diclofenac gel instead as BC powder could worsening her kidney function. Pt verbalized understanding.     5. Type 2 diabetes mellitus with stage 3b chronic kidney disease, without long-term current use of insulin  A1c 6.3. ADA diet and exercise. Cont Glipzide, pioglitazone.  Pt started on  Farxiga 5 mg 1 tab po daily on last visit. Drink plenty of water. Urine microalbumin 3-5-24. DM foot exam done 2-24-24. DM eye exam done with Dr Mcpherson at [facility] 5-5-24- will get staff to request records from Dr Mcpherson's office to fax records for her chart.     6. Obesity, unspecified classification, unspecified obesity type, unspecified whether serious  comorbidity present  BMI 39. Encouraged low fat diet and exercise. Education provided.     7. Breast cancer screening by mammogram  MMG in 1 month- order sent to [facility] per pt request.     8. Well adult exam  Labs in 5 months. DEXA in 3 months. MMG in 1 month- order sent to [facility] per pt request.     9. Primary osteoarthritis of both knees  Pt has OA left knee with knee pain affecting ambulation.  Pt requesting RX for rollator due to knee pain. The patient needs more stability than provided ty a cane. RX given to pt for rollator to get filled at DME company of her choice covered on her insurance. Pt needs a rollator with a seat for rest when needed. Last XR left knee done at [facility] 9-. Will get XR Left knee prior to next appt- order sent to [facility] per pt request.     10. Osteopenia of necks of both femurs  DEXA in 3 months. Encouraged calcium with Vit D 600 mg 1 tab po BID. Encouraged wt bearing exercise such as walking daily.     11. Menopause  DEXA in 3 months. Encouraged calcium with Vit D 600 mg 1 tab po BID. Encouraged wt bearing exercise such as walking daily.  Offered to send order to [facility] with other radiological orders however pt states she will just have it here at [facility].          Follow up in about 5 months (around 11/5/2024) for with labs 1 week prior to appt. . In addition to their scheduled follow up, the patient has also been instructed to follow up on as needed basis.     Future Appointments   Date Time Provider Department Center   10/24/2024 10:00 AM Shikha Ramirez FNP [facility] EVONNE Anthony   6/16/2025 10:00 AM Mariana Whelan MD [facility] MAMIE Anderson Un        [facility] [place]

## 2024-06-05 NOTE — TELEPHONE ENCOUNTER
----- Message from Paradise Lopez sent at 6/5/2024  6:57 AM CDT -----  Regarding: Pt advise  Contact: Pt  Pt states that she ate before taking labs,....Should I continue w/ Appointment    Pt requesting a call back     Phone  [phone]    Thank You

## 2024-06-10 PROBLEM — Z00.00 WELL ADULT EXAM: Status: RESOLVED | Noted: 2024-03-05 | Resolved: 2024-06-10

## 2024-07-09 DIAGNOSIS — M17.0 PRIMARY OSTEOARTHRITIS OF BOTH KNEES: Primary | ICD-10-CM

## 2024-07-09 NOTE — PROGRESS NOTES
RX for heavy duty rollator written per pt request. Please fax order to [facility] Fax# [phone].

## 2024-07-16 ENCOUNTER — HOSPITAL ENCOUNTER (OUTPATIENT)
Dept: RADIOLOGY | Facility: HOSPITAL | Age: 74
Discharge: HOME | End: 2024-07-16
Attending: NURSE PRACTITIONER
Payer: MEDICARE

## 2024-07-16 DIAGNOSIS — Z12.31 BREAST CANCER SCREENING BY MAMMOGRAM: ICD-10-CM

## 2024-07-16 PROCEDURE — 77067 SCR MAMMO BI INCL CAD: CPT | Mod: 26,,, | Performed by: RADIOLOGY

## 2024-07-16 PROCEDURE — 77063 BREAST TOMOSYNTHESIS BI: CPT | Mod: TC

## 2024-07-16 PROCEDURE — 77063 BREAST TOMOSYNTHESIS BI: CPT | Mod: 26,,, | Performed by: RADIOLOGY

## 2024-07-16 PROCEDURE — 77067 SCR MAMMO BI INCL CAD: CPT | Mod: TC

## 2024-07-17 ENCOUNTER — TELEPHONE (OUTPATIENT)
Dept: INTERNAL MEDICINE | Facility: CLINIC | Age: 74
End: 2024-07-17
Payer: MEDICARE

## 2024-07-17 NOTE — TELEPHONE ENCOUNTER
----- Message from GEN Arcos sent at 7/17/2024  4:59 AM CDT -----  Regarding: RE: order  Order was printed 7-9-24 to refer to [facility] per pt request. Do I need to reprint to have faxed?  ----- Message -----  From: Maribel Archer MA  Sent: 7/16/2024   1:07 PM CDT  To: GEN Arcos  Subject: FW: order                                        I do not see order in chart. Please advise.  ----- Message -----  From: Louise Soria  Sent: 7/15/2024   9:40 AM CDT  To: [facility] Staff  Subject: order                                            Who Called:Corrine HUGGINS    Caller is requesting assistance/information from provider's office.    Symptoms (please be specific):      How long has patient had these symptoms:      List of preferred pharmacies on file (remove unneeded): [facility];   phone: [phone]        Preferred Method of Contact: Phone Call  Patient's Preferred Phone Number on File: [phone]   Best Call Back Number, if different:  Additional Information: Pt is requesting that order for Rollator be sent to [facility] in [place]; please advise.

## 2024-07-19 ENCOUNTER — TELEPHONE (OUTPATIENT)
Dept: INTERNAL MEDICINE | Facility: CLINIC | Age: 74
End: 2024-07-19
Payer: MEDICARE

## 2024-08-23 DIAGNOSIS — N18.30 CONTROLLED TYPE 2 DIABETES MELLITUS WITH STAGE 3 CHRONIC KIDNEY DISEASE, WITHOUT LONG-TERM CURRENT USE OF INSULIN: ICD-10-CM

## 2024-08-23 DIAGNOSIS — E11.22 CONTROLLED TYPE 2 DIABETES MELLITUS WITH STAGE 3 CHRONIC KIDNEY DISEASE, WITHOUT LONG-TERM CURRENT USE OF INSULIN: ICD-10-CM

## 2024-08-23 RX ORDER — DAPAGLIFLOZIN 5 MG/1
5 TABLET, FILM COATED ORAL DAILY
Qty: 90 TABLET | Refills: 1 | Status: SHIPPED | OUTPATIENT
Start: 2024-08-23

## 2024-08-23 NOTE — TELEPHONE ENCOUNTER
----- Message from Abelino Sullivan sent at 8/23/2024 12:01 PM CDT -----  Who Called: Fabiola Mejia    Refill or New Rx:Refill    RX Name and Strength:dapagliflozin propanediol (FARXIGA) 5 mg Tab tablet  How is the patient currently taking it? (ex. 1XDay):1X  Is this a 30 day or 90 day RX:90  Local or Mail Order:LOCAL  List of preferred pharmacies on file (remove unneeded): [unfilled]  If different Pharmacy is requested, enter Pharmacy information here including location and phone number:               [facility]      Ordering Provider:    Patient's Preferred Phone Number on File: [phone]   Best Call Back Number, if different:    Additional Information: pt would like medication above pharmacy

## 2024-08-23 NOTE — TELEPHONE ENCOUNTER
Pt requesting refill be sent to [facility]. Please advise.      LOV:6/5/24    NOV: 11/5/24

## 2024-09-09 ENCOUNTER — OFFICE VISIT (OUTPATIENT)
Dept: INTERNAL MEDICINE | Facility: CLINIC | Age: 74
End: 2024-09-09
Payer: MEDICARE

## 2024-09-09 VITALS
WEIGHT: 245 LBS | RESPIRATION RATE: 18 BRPM | TEMPERATURE: 98 F | BODY MASS INDEX: 43.41 KG/M2 | DIASTOLIC BLOOD PRESSURE: 82 MMHG | HEART RATE: 87 BPM | SYSTOLIC BLOOD PRESSURE: 136 MMHG | HEIGHT: 63 IN

## 2024-09-09 DIAGNOSIS — M25.561 CHRONIC PAIN OF BOTH KNEES: ICD-10-CM

## 2024-09-09 DIAGNOSIS — G89.29 CHRONIC PAIN OF BOTH KNEES: ICD-10-CM

## 2024-09-09 DIAGNOSIS — M06.9 RHEUMATOID ARTHRITIS, INVOLVING UNSPECIFIED SITE, UNSPECIFIED WHETHER RHEUMATOID FACTOR PRESENT: ICD-10-CM

## 2024-09-09 DIAGNOSIS — M25.511 CHRONIC RIGHT SHOULDER PAIN: ICD-10-CM

## 2024-09-09 DIAGNOSIS — M25.562 CHRONIC PAIN OF BOTH KNEES: ICD-10-CM

## 2024-09-09 DIAGNOSIS — S14.109A UNSPECIFIED INJURY AT UNSPECIFIED LEVEL OF CERVICAL SPINAL CORD, INITIAL ENCOUNTER: ICD-10-CM

## 2024-09-09 DIAGNOSIS — G89.29 CHRONIC RIGHT SHOULDER PAIN: ICD-10-CM

## 2024-09-09 DIAGNOSIS — M79.89 LEG SWELLING: Primary | ICD-10-CM

## 2024-09-09 PROCEDURE — 3061F NEG MICROALBUMINURIA REV: CPT | Mod: CPTII,,, | Performed by: NURSE PRACTITIONER

## 2024-09-09 PROCEDURE — 3066F NEPHROPATHY DOC TX: CPT | Mod: CPTII,,, | Performed by: NURSE PRACTITIONER

## 2024-09-09 PROCEDURE — 1160F RVW MEDS BY RX/DR IN RCRD: CPT | Mod: CPTII,,, | Performed by: NURSE PRACTITIONER

## 2024-09-09 PROCEDURE — 3075F SYST BP GE 130 - 139MM HG: CPT | Mod: CPTII,,, | Performed by: NURSE PRACTITIONER

## 2024-09-09 PROCEDURE — 3288F FALL RISK ASSESSMENT DOCD: CPT | Mod: CPTII,,, | Performed by: NURSE PRACTITIONER

## 2024-09-09 PROCEDURE — 1126F AMNT PAIN NOTED NONE PRSNT: CPT | Mod: CPTII,,, | Performed by: NURSE PRACTITIONER

## 2024-09-09 PROCEDURE — 3044F HG A1C LEVEL LT 7.0%: CPT | Mod: CPTII,,, | Performed by: NURSE PRACTITIONER

## 2024-09-09 PROCEDURE — 1159F MED LIST DOCD IN RCRD: CPT | Mod: CPTII,,, | Performed by: NURSE PRACTITIONER

## 2024-09-09 PROCEDURE — 1101F PT FALLS ASSESS-DOCD LE1/YR: CPT | Mod: CPTII,,, | Performed by: NURSE PRACTITIONER

## 2024-09-09 PROCEDURE — 99215 OFFICE O/P EST HI 40 MIN: CPT | Mod: PBBFAC | Performed by: NURSE PRACTITIONER

## 2024-09-09 PROCEDURE — 3079F DIAST BP 80-89 MM HG: CPT | Mod: CPTII,,, | Performed by: NURSE PRACTITIONER

## 2024-09-09 PROCEDURE — 99213 OFFICE O/P EST LOW 20 MIN: CPT | Mod: S$PBB,,, | Performed by: NURSE PRACTITIONER

## 2024-09-09 NOTE — PROGRESS NOTES
"   Patient ID: [id number]     Chief Complaint: BILATERAL LEG SWELLING        HPI:     HPI      Fabiola Mejia is a 74 y.o. female here today for c/o bilat leg swelling. Pt states symptoms have improved since calling for an appointment.              -------------------------------------    CKD (chronic kidney disease) stage 3, GFR 30-59 ml/min    Connective tissue disorder    Degenerative arthritis of knee, bilateral    Diabetes mellitus due to underlying condition with stage 3 chronic kidney disease, without long-term current use of insulin, unspecified whether stage 3a or 3b CKD    Fibromyalgia    HLD (hyperlipidemia)    HTN (hypertension)    Osteopenia        Past Surgical History:   Procedure Laterality Date    TONSILLECTOMY         Review of patient's allergies indicates:   Allergen Reactions    Codeine Other (See Comments)    Gabapentin     Hydroxychloroquine      Other Reaction(s): Loose bowel movement    Methocarbamol      Other Reaction(s): makes her sleepy and feel "funny"    Acetaminophen-codeine Other (See Comments)    Tramadol Rash       Current Outpatient Medications   Medication Instructions    blood sugar diagnostic Strp To check BG 1 times daily, to use with insurance preferred meter    blood-glucose meter kit To check BG 1 times daily, to use with insurance preferred meter    clobetasoL (TEMOVATE) 0.05 % cream     dapagliflozin propanediol (FARXIGA) 5 mg, Oral, Daily    desonide (DESOWEN) 0.05 % cream     diclofenac sodium (VOLTAREN) 4 g, Topical (Top), 4 times daily PRN    ergocalciferol (ERGOCALCIFEROL) 50,000 Units, Oral, Every 7 days    glipiZIDE (GLUCOTROL) 5 MG tablet Take 1 tab po daily.    lancets Misc To check BG 1 times daily, to use with insurance preferred meter    methylPREDNISolone (MEDROL DOSEPACK) 4 mg tablet use as directed    multivitamin (THERAGRAN) per tablet 1 tablet, Oral, Daily    pantoprazole (PROTONIX) 40 mg, Oral, Daily    pioglitazone (ACTOS) 15 MG tablet Take 1 tab po daily. "    pravastatin (PRAVACHOL) 40 MG tablet Take 1 tab po Q hs.    spironolactone (ALDACTONE) 25 MG tablet Take 1 tab po daily.    UNABLE TO FIND 1 capsule, Oral, Daily, medication name: tumeric curcumin with adrian powder    VITAMIN A ORAL 2,400 mcg, Oral, Daily    vitamin E 400 Units, Oral, Daily       Social History     Socioeconomic History    Marital status:    Tobacco Use    Smoking status: Never    Smokeless tobacco: Never   Substance and Sexual Activity    Alcohol use: Never    Drug use: Never    Sexual activity: Not Currently     Social Determinants of Health     Financial Resource Strain: Low Risk  (1/31/2024)    Overall Financial Resource Strain (CARDIA)     Difficulty of Paying Living Expenses: Not hard at all   Food Insecurity: No Food Insecurity (1/31/2024)    Hunger Vital Sign     Worried About Running Out of Food in the Last Year: Never true     Ran Out of Food in the Last Year: Never true   Transportation Needs: No Transportation Needs (1/31/2024)    PRAPARE - Transportation     Lack of Transportation (Medical): No     Lack of Transportation (Non-Medical): No   Physical Activity: Inactive (1/31/2024)    Exercise Vital Sign     Days of Exercise per Week: 0 days     Minutes of Exercise per Session: 0 min   Stress: No Stress Concern Present (1/31/2024)    [employer] [place] [employer] - Occupational Stress Questionnaire     Feeling of Stress : Not at all   Housing Stability: Low Risk  (1/31/2024)    Housing Stability Vital Sign     Unable to Pay for Housing in the Last Year: No     Number of Places Lived in the Last Year: 1     Unstable Housing in the Last Year: No        Family History   Problem Relation Name Age of Onset    Hypertension Mother      Diabetes Mother      Heart disease Mother      No Known Problems Father          Patient Care Team:  Mariama Rios FNP as PCP - General (Family Medicine)     Subjective:     Review of Systems     See HPI for details    Constitutional:  "Denies Change in appetite. Denies Chills. Denies Fever. Denies Night sweats.  Eye: Denies Blurred vision. Denies Discharge. Denies Eye pain.  ENT: Denies Decreased hearing. Denies Sore throat. Denies Swollen glands.  Respiratory: Denies Cough. Denies Shortness of breath. Denies Shortness of breath with exertion. Denies Wheezing.  Cardiovascular: DeniesChest pain at rest. Denies Chest pain with exertion. Denies Irregular heartbeat. Denies Palpitations. Denies Edema.  Gastrointestinal: Denies Abdominal pain. DeniesDiarrhea. Denies Nausea. Denies Vomiting. Denies Hematemesis or Hematochezia.  Genitourinary: Denies Dysuria. Denies Urinary frequency. Denies Urinary urgency. Denies Blood in urine.  Endocrine: Denies Cold intolerance. Denies Excessive thirst. Denies Heat intolerance. Denies Weight loss. Denies Weight gain.  Musculoskeletal: Admits Painful joints. Denies Weakness.  Integumentary: Denies Rash. Denies Itching. Denies Dry skin.  Neurologic: Denies Dizziness. Denies Fainting. Denies Headache.  Psychiatric: Denies Depression. Denies Anxiety. Denies Suicidal/Homicidal ideations.    All Other ROS: Negative except as stated in HPI.       Objective:     Visit Vitals  /82 (BP Location: Right arm, Patient Position: Sitting, BP Method: Large (Automatic))   Pulse 87   Temp 97.9 °F (36.6 °C) (Oral)   Resp 18   Ht 5' 3" (1.6 m)   Wt 111.1 kg (245 lb)   BMI 43.40 kg/m²       Physical Exam    General: Alert and oriented, No acute distress.  Head: Normocephalic, Atraumatic.  Eye: Pupils are equal, round and reactive to light, Extraocular movements are intact, Sclera non-icteric.  Ears/Nose/Throat: Normal, Mucosa moist,Clear.  Neck/Thyroid: Supple, Non-tender, No carotid bruit, No lymphadenopathy, No JVD, Full range of motion.  Respiratory: Clear to auscultation bilaterally; No wheezes, rales or rhonchi,Non-labored respirations, Symmetrical chest wall expansion.  Cardiovascular: Regular rate and rhythm, S1/S2 normal, No " murmurs, rubs or gallops.  Gastrointestinal: Soft, Non-tender, Non-distended, Normal bowel sounds, No palpable organomegaly.  Musculoskeletal: Decrease ROM noted to right shoulder with external rotation.   Integumentary: Warm, Dry, Intact, No suspicious lesions or rashes.  Extremities: No clubbing, cyanosis or edema  Neurologic: No focal deficits, Cranial Nerves II-XII are grossly intact, Motor strength normal upper and lower extremities, Sensory exam intact.  Psychiatric: Normal interaction, Coherent speech, Euthymic mood, Appropriate affect       Labs Reviewed:     Chemistry:  Lab Results   Component Value Date     06/05/2024    K 4.7 06/05/2024    BUN 22.7 (H) 06/05/2024    CREATININE 1.38 (H) 06/05/2024    EGFRNORACEVR 40 06/05/2024    GLUCOSE 123 (H) 06/05/2024    CALCIUM 10.0 06/05/2024    ALKPHOS 61 04/22/2024    LABPROT 7.7 (H) 04/22/2024    ALBUMIN 3.7 04/22/2024    BILIDIR 0.3 02/21/2023    IBILI 0.40 02/21/2023    AST 21 04/22/2024    ALT 15 04/22/2024    ETYZUGFG13PH 54.5 02/29/2024        Lab Results   Component Value Date    HGBA1C 6.3 04/22/2024        Hematology:  Lab Results   Component Value Date    WBC 4.63 04/22/2024    HGB 13.1 04/22/2024    HCT 40.2 04/22/2024     04/22/2024       Lipid Panel:  Lab Results   Component Value Date    CHOL 203 (H) 04/22/2024    HDL 55 04/22/2024    .00 04/22/2024    TRIG 78 04/22/2024    TOTALCHOLEST 4 04/22/2024        Urine:  Lab Results   Component Value Date    APPEARANCEUA Clear 04/22/2024    SGUA 1.013 04/22/2024    PROTEINUA Negative 04/22/2024    KETONESUA Negative 04/22/2024    LEUKOCYTESUR 250 (A) 04/22/2024    RBCUA 0-5 04/22/2024    WBCUA 11-20 (A) 04/22/2024    BACTERIA Trace (A) 04/22/2024    SQEPUA Occ (A) 04/22/2024    HYALINECASTS None Seen 04/22/2024    CREATRANDUR 46.5 04/22/2024    PROTEINURINE <6.8 04/22/2024        Assessment:       ICD-10-CM ICD-9-CM   1. Leg swelling  M79.89 729.81   2. Rheumatoid arthritis, involving  unspecified site, unspecified whether rheumatoid factor present  M06.9 714.0   3. Unspecified injury at unspecified level of cervical spinal cord, initial encounter  S14.109A 952.00   4. Chronic right shoulder pain  M25.511 719.41    G89.29 338.29        Plan:     Bilat leg swelling  Minimal swelling noted to bilat legs. Encouraged low salt diet. Elevated legs when sitting. Encouraged compression socks to help with swelling.     2. RA   Pt referred to Rheum clinic for further mgmt- appt scheduled 6-16-25- keep appt.     3. Unspecified injury at unsepcified level of Cervical spine    Pt had CT C-spine done at [facility] on 9-15-28 showing degenerative changes seen. Cont Diclofenac gel as prescribed prn pain.     4. Chronic knee pain bilat  XR bilat knees today.     5. Right shoulder pain  XR right shoulder today.     Follow up in about 1 month (around 10/9/2024) for for re-eval right shoulder pain. In addition to their scheduled follow up, the patient has also been instructed to follow up on as needed basis.     Future Appointments   Date Time Provider Department Center   9/26/2024 10:00 AM [facility] DEXA1 300 LB LIMIT [facility] XRAY [facility]   10/24/2024 10:00 AM Shikha Ramirez FNP [facility]ONNE [facility]   11/5/2024  9:40 AM Mariama Rios FNP [facility] MAN[facility]   6/16/2025 10:00 AM Mariana Whelan MD [facility] MAMIE [facility]        GEN [place]

## 2024-09-26 ENCOUNTER — HOSPITAL ENCOUNTER (OUTPATIENT)
Dept: RADIOLOGY | Facility: HOSPITAL | Age: 74
Discharge: HOME | End: 2024-09-26
Attending: NURSE PRACTITIONER
Payer: MEDICARE

## 2024-09-26 ENCOUNTER — TELEPHONE (OUTPATIENT)
Dept: INTERNAL MEDICINE | Facility: CLINIC | Age: 74
End: 2024-09-26
Payer: MEDICARE

## 2024-09-26 DIAGNOSIS — G89.29 CHRONIC PAIN OF BOTH KNEES: ICD-10-CM

## 2024-09-26 DIAGNOSIS — M85.851 OSTEOPENIA OF NECKS OF BOTH FEMURS: ICD-10-CM

## 2024-09-26 DIAGNOSIS — M85.852 OSTEOPENIA OF NECKS OF BOTH FEMURS: ICD-10-CM

## 2024-09-26 DIAGNOSIS — M25.562 CHRONIC PAIN OF BOTH KNEES: ICD-10-CM

## 2024-09-26 DIAGNOSIS — G89.29 CHRONIC RIGHT SHOULDER PAIN: ICD-10-CM

## 2024-09-26 DIAGNOSIS — Z78.0 MENOPAUSE: ICD-10-CM

## 2024-09-26 DIAGNOSIS — M25.511 CHRONIC RIGHT SHOULDER PAIN: ICD-10-CM

## 2024-09-26 DIAGNOSIS — M25.561 CHRONIC PAIN OF BOTH KNEES: ICD-10-CM

## 2024-09-26 PROCEDURE — 73560 X-RAY EXAM OF KNEE 1 OR 2: CPT | Mod: TC,RT

## 2024-09-26 PROCEDURE — 73030 X-RAY EXAM OF SHOULDER: CPT | Mod: TC,RT

## 2024-09-26 PROCEDURE — 73560 X-RAY EXAM OF KNEE 1 OR 2: CPT | Mod: TC,LT

## 2024-09-26 PROCEDURE — 77080 DXA BONE DENSITY AXIAL: CPT | Mod: TC

## 2024-09-27 ENCOUNTER — TELEPHONE (OUTPATIENT)
Dept: INTERNAL MEDICINE | Facility: CLINIC | Age: 74
End: 2024-09-27
Payer: MEDICARE

## 2024-09-29 ENCOUNTER — TELEPHONE (OUTPATIENT)
Dept: INTERNAL MEDICINE | Facility: CLINIC | Age: 74
End: 2024-09-29
Payer: MEDICARE

## 2024-09-29 NOTE — TELEPHONE ENCOUNTER
Call pt and inform Right shoulder XR results show arthritic changes. Encourage alternating ice and heat to affected area to help with pain relief. Cont Diclofenac gel to affected area as prescribed prn pain due to decreased kidney function.

## 2024-10-18 ENCOUNTER — LAB VISIT (OUTPATIENT)
Dept: LAB | Facility: HOSPITAL | Age: 74
End: 2024-10-18
Attending: NURSE PRACTITIONER
Payer: MEDICARE

## 2024-10-18 DIAGNOSIS — N18.31 CKD STAGE G3A/A1, GFR 45-59 AND ALBUMIN CREATININE RATIO <30 MG/G: ICD-10-CM

## 2024-10-18 LAB
ALBUMIN SERPL-MCNC: 3.8 G/DL (ref 3.4–4.8)
ALBUMIN/GLOB SERPL: 1.1 RATIO (ref 1.1–2)
ALP SERPL-CCNC: 71 UNIT/L (ref 40–150)
ALT SERPL-CCNC: 12 UNIT/L (ref 0–55)
ANION GAP SERPL CALC-SCNC: 9 MEQ/L
AST SERPL-CCNC: 12 UNIT/L (ref 5–34)
BACTERIA #/AREA URNS AUTO: NORMAL /HPF
BILIRUB SERPL-MCNC: 0.8 MG/DL
BILIRUB UR QL STRIP.AUTO: NEGATIVE
BUN SERPL-MCNC: 38.6 MG/DL (ref 9.8–20.1)
CALCIUM SERPL-MCNC: 9.7 MG/DL (ref 8.4–10.2)
CHLORIDE SERPL-SCNC: 105 MMOL/L (ref 98–107)
CLARITY UR: CLEAR
CO2 SERPL-SCNC: 26 MMOL/L (ref 23–31)
COLOR UR AUTO: ABNORMAL
CREAT SERPL-MCNC: 1.79 MG/DL (ref 0.55–1.02)
CREAT UR-MCNC: 52.4 MG/DL (ref 45–106)
CREAT/UREA NIT SERPL: 22
GFR SERPLBLD CREATININE-BSD FMLA CKD-EPI: 29 ML/MIN/1.73/M2
GLOBULIN SER-MCNC: 3.6 GM/DL (ref 2.4–3.5)
GLUCOSE SERPL-MCNC: 151 MG/DL (ref 82–115)
GLUCOSE UR QL STRIP: ABNORMAL
HGB UR QL STRIP: NEGATIVE
KETONES UR QL STRIP: NEGATIVE
LEUKOCYTE ESTERASE UR QL STRIP: NEGATIVE
NITRITE UR QL STRIP: NEGATIVE
PH UR STRIP: 6 [PH]
POTASSIUM SERPL-SCNC: 5.2 MMOL/L (ref 3.5–5.1)
PROT SERPL-MCNC: 7.4 GM/DL (ref 5.8–7.6)
PROT UR QL STRIP: NEGATIVE
PROT UR STRIP-MCNC: <6.8 MG/DL
PTH-INTACT SERPL-MCNC: 140.8 PG/ML (ref 8.7–77)
RBC #/AREA URNS AUTO: NORMAL /HPF
SODIUM SERPL-SCNC: 140 MMOL/L (ref 136–145)
SP GR UR STRIP.AUTO: 1.01 (ref 1–1.03)
SQUAMOUS #/AREA URNS AUTO: NORMAL /HPF
UROBILINOGEN UR STRIP-ACNC: 0.2
WBC #/AREA URNS AUTO: NORMAL /HPF

## 2024-10-18 PROCEDURE — 82570 ASSAY OF URINE CREATININE: CPT

## 2024-10-18 PROCEDURE — 81003 URINALYSIS AUTO W/O SCOPE: CPT

## 2024-10-18 PROCEDURE — 80053 COMPREHEN METABOLIC PANEL: CPT

## 2024-10-18 PROCEDURE — 81001 URINALYSIS AUTO W/SCOPE: CPT

## 2024-10-18 PROCEDURE — 83970 ASSAY OF PARATHORMONE: CPT

## 2024-10-18 PROCEDURE — 84156 ASSAY OF PROTEIN URINE: CPT

## 2024-10-18 PROCEDURE — 36415 COLL VENOUS BLD VENIPUNCTURE: CPT

## 2024-10-24 ENCOUNTER — DOCUMENTATION ONLY (OUTPATIENT)
Dept: NEPHROLOGY | Facility: CLINIC | Age: 74
End: 2024-10-24
Payer: MEDICARE

## 2024-10-24 NOTE — PROGRESS NOTES
Patient was no-show to clinic today. If patient calls back to reschedule, please schedule within 2-3 months. If you are able to reach the patient, please ask her to stop spironolactone (due to elevated potassium level). Thank you

## 2024-10-30 ENCOUNTER — TELEPHONE (OUTPATIENT)
Dept: NEPHROLOGY | Facility: CLINIC | Age: 74
End: 2024-10-30
Payer: MEDICARE

## 2024-10-30 DIAGNOSIS — E11.22 TYPE 2 DIABETES MELLITUS WITH STAGE 3B CHRONIC KIDNEY DISEASE, WITHOUT LONG-TERM CURRENT USE OF INSULIN: Primary | ICD-10-CM

## 2024-10-30 DIAGNOSIS — N18.32 STAGE 3B CHRONIC KIDNEY DISEASE: ICD-10-CM

## 2024-10-30 DIAGNOSIS — N18.32 TYPE 2 DIABETES MELLITUS WITH STAGE 3B CHRONIC KIDNEY DISEASE, WITHOUT LONG-TERM CURRENT USE OF INSULIN: Primary | ICD-10-CM

## 2024-11-08 ENCOUNTER — LAB VISIT (OUTPATIENT)
Dept: LAB | Facility: HOSPITAL | Age: 74
End: 2024-11-08
Attending: NURSE PRACTITIONER
Payer: MEDICARE

## 2024-11-08 DIAGNOSIS — N18.32 TYPE 2 DIABETES MELLITUS WITH STAGE 3B CHRONIC KIDNEY DISEASE, WITHOUT LONG-TERM CURRENT USE OF INSULIN: ICD-10-CM

## 2024-11-08 DIAGNOSIS — N18.32 STAGE 3B CHRONIC KIDNEY DISEASE: ICD-10-CM

## 2024-11-08 DIAGNOSIS — E11.22 TYPE 2 DIABETES MELLITUS WITH STAGE 3B CHRONIC KIDNEY DISEASE, WITHOUT LONG-TERM CURRENT USE OF INSULIN: ICD-10-CM

## 2024-11-08 LAB
ANION GAP SERPL CALC-SCNC: 6 MEQ/L
BUN SERPL-MCNC: 36.5 MG/DL (ref 9.8–20.1)
CALCIUM SERPL-MCNC: 10.1 MG/DL (ref 8.4–10.2)
CHLORIDE SERPL-SCNC: 106 MMOL/L (ref 98–107)
CHOLEST SERPL-MCNC: 308 MG/DL
CHOLEST/HDLC SERPL: 6 {RATIO} (ref 0–5)
CO2 SERPL-SCNC: 28 MMOL/L (ref 23–31)
CREAT SERPL-MCNC: 1.53 MG/DL (ref 0.55–1.02)
CREAT/UREA NIT SERPL: 24
EST. AVERAGE GLUCOSE BLD GHB EST-MCNC: 188.6 MG/DL
GFR SERPLBLD CREATININE-BSD FMLA CKD-EPI: 35 ML/MIN/1.73/M2
GLUCOSE SERPL-MCNC: 139 MG/DL (ref 82–115)
HBA1C MFR BLD: 8.2 %
HDLC SERPL-MCNC: 55 MG/DL (ref 35–60)
LDLC SERPL CALC-MCNC: 210 MG/DL (ref 50–140)
POTASSIUM SERPL-SCNC: 6 MMOL/L (ref 3.5–5.1)
SODIUM SERPL-SCNC: 140 MMOL/L (ref 136–145)
TRIGL SERPL-MCNC: 216 MG/DL (ref 37–140)
TSH SERPL-ACNC: 2.42 UIU/ML (ref 0.35–4.94)
VLDLC SERPL CALC-MCNC: 43 MG/DL

## 2024-11-08 PROCEDURE — 80061 LIPID PANEL: CPT

## 2024-11-08 PROCEDURE — 83036 HEMOGLOBIN GLYCOSYLATED A1C: CPT

## 2024-11-08 PROCEDURE — 84443 ASSAY THYROID STIM HORMONE: CPT

## 2024-11-08 PROCEDURE — 80048 BASIC METABOLIC PNL TOTAL CA: CPT

## 2024-11-08 PROCEDURE — 36415 COLL VENOUS BLD VENIPUNCTURE: CPT

## 2024-11-22 ENCOUNTER — OFFICE VISIT (OUTPATIENT)
Dept: INTERNAL MEDICINE | Facility: CLINIC | Age: 74
End: 2024-11-22
Payer: MEDICARE

## 2024-11-22 VITALS
HEART RATE: 90 BPM | BODY MASS INDEX: 44.83 KG/M2 | SYSTOLIC BLOOD PRESSURE: 129 MMHG | HEIGHT: 63 IN | RESPIRATION RATE: 18 BRPM | DIASTOLIC BLOOD PRESSURE: 77 MMHG | TEMPERATURE: 98 F | WEIGHT: 253 LBS

## 2024-11-22 DIAGNOSIS — E11.22 TYPE 2 DIABETES MELLITUS WITH STAGE 3B CHRONIC KIDNEY DISEASE, WITHOUT LONG-TERM CURRENT USE OF INSULIN: ICD-10-CM

## 2024-11-22 DIAGNOSIS — E78.2 MIXED HYPERLIPIDEMIA: ICD-10-CM

## 2024-11-22 DIAGNOSIS — E11.22 CONTROLLED TYPE 2 DIABETES MELLITUS WITH STAGE 3 CHRONIC KIDNEY DISEASE, WITHOUT LONG-TERM CURRENT USE OF INSULIN: ICD-10-CM

## 2024-11-22 DIAGNOSIS — E66.9 OBESITY, UNSPECIFIED CLASS, UNSPECIFIED OBESITY TYPE, UNSPECIFIED WHETHER SERIOUS COMORBIDITY PRESENT: ICD-10-CM

## 2024-11-22 DIAGNOSIS — N18.30 CONTROLLED TYPE 2 DIABETES MELLITUS WITH STAGE 3 CHRONIC KIDNEY DISEASE, WITHOUT LONG-TERM CURRENT USE OF INSULIN: ICD-10-CM

## 2024-11-22 DIAGNOSIS — N18.32 TYPE 2 DIABETES MELLITUS WITH STAGE 3B CHRONIC KIDNEY DISEASE, WITHOUT LONG-TERM CURRENT USE OF INSULIN: ICD-10-CM

## 2024-11-22 DIAGNOSIS — E87.5 HYPERKALEMIA: Primary | ICD-10-CM

## 2024-11-22 PROCEDURE — 99214 OFFICE O/P EST MOD 30 MIN: CPT | Mod: PBBFAC | Performed by: NURSE PRACTITIONER

## 2024-11-22 RX ORDER — PIOGLITAZONEHYDROCHLORIDE 15 MG/1
TABLET ORAL
Qty: 90 TABLET | Refills: 1 | Status: SHIPPED | OUTPATIENT
Start: 2024-11-22

## 2024-11-22 RX ORDER — GLIPIZIDE 5 MG/1
TABLET ORAL
Qty: 90 TABLET | Refills: 1 | Status: SHIPPED | OUTPATIENT
Start: 2024-11-22

## 2024-11-22 RX ORDER — PRAVASTATIN SODIUM 40 MG/1
TABLET ORAL
Qty: 90 TABLET | Refills: 1 | Status: SHIPPED | OUTPATIENT
Start: 2024-11-22

## 2024-11-22 RX ORDER — DAPAGLIFLOZIN 5 MG/1
5 TABLET, FILM COATED ORAL DAILY
Qty: 90 TABLET | Refills: 1 | Status: SHIPPED | OUTPATIENT
Start: 2024-11-22

## 2024-11-22 RX ORDER — DAPAGLIFLOZIN 5 MG/1
5 TABLET, FILM COATED ORAL DAILY
Qty: 90 TABLET | Refills: 1 | Status: SHIPPED | OUTPATIENT
Start: 2024-11-22 | End: 2024-11-22 | Stop reason: SDUPTHER

## 2024-11-22 NOTE — PROGRESS NOTES
"   Patient ID: [id number]     Chief Complaint: lab results        HPI:     HPI      Fabiola Mejia is a 74 y.o. female here today for a follow up.              -------------------------------------    CKD (chronic kidney disease) stage 3, GFR 30-59 ml/min    Connective tissue disorder    Degenerative arthritis of knee, bilateral    Diabetes mellitus due to underlying condition with stage 3 chronic kidney disease, without long-term current use of insulin, unspecified whether stage 3a or 3b CKD    Fibromyalgia    HLD (hyperlipidemia)    HTN (hypertension)    Osteopenia        Past Surgical History:   Procedure Laterality Date    TONSILLECTOMY         Review of patient's allergies indicates:   Allergen Reactions    Codeine Other (See Comments)    Gabapentin     Hydroxychloroquine      Other Reaction(s): Loose bowel movement    Methocarbamol      Other Reaction(s): makes her sleepy and feel "funny"    Acetaminophen-codeine Other (See Comments)    Tramadol Rash       Current Outpatient Medications   Medication Instructions    blood sugar diagnostic Strp To check BG 1 times daily, to use with insurance preferred meter    blood-glucose meter kit To check BG 1 times daily, to use with insurance preferred meter    clobetasoL (TEMOVATE) 0.05 % cream     dapagliflozin propanediol (FARXIGA) 5 mg, Oral, Daily    desonide (DESOWEN) 0.05 % cream     diclofenac sodium (VOLTAREN) 4 g, Topical (Top), 4 times daily PRN    ergocalciferol (ERGOCALCIFEROL) 50,000 Units, Every 7 days    glipiZIDE (GLUCOTROL) 5 MG tablet Take 1 tab po daily.    lancets Misc To check BG 1 times daily, to use with insurance preferred meter    methylPREDNISolone (MEDROL DOSEPACK) 4 mg tablet use as directed    multivitamin (THERAGRAN) per tablet 1 tablet, Daily    pantoprazole (PROTONIX) 40 mg, Daily    pioglitazone (ACTOS) 15 MG tablet Take 1 tab po daily.    pravastatin (PRAVACHOL) 40 MG tablet Take 1 tab po Q hs.    UNABLE TO FIND 1 capsule, Daily    VITAMIN A " ORAL 2,400 mcg, Daily    vitamin E 400 Units, Daily       Social History     Socioeconomic History    Marital status:    Tobacco Use    Smoking status: Never    Smokeless tobacco: Never   Substance and Sexual Activity    Alcohol use: Never    Drug use: Never    Sexual activity: Not Currently     Social Drivers of Health     Financial Resource Strain: Low Risk  (1/31/2024)    Overall Financial Resource Strain (CARDIA)     Difficulty of Paying Living Expenses: Not hard at all   Food Insecurity: No Food Insecurity (1/31/2024)    Hunger Vital Sign     Worried About Running Out of Food in the Last Year: Never true     Ran Out of Food in the Last Year: Never true   Transportation Needs: No Transportation Needs (1/31/2024)    PRAPARE - Transportation     Lack of Transportation (Medical): No     Lack of Transportation (Non-Medical): No   Physical Activity: Inactive (1/31/2024)    Exercise Vital Sign     Days of Exercise per Week: 0 days     Minutes of Exercise per Session: 0 min   Stress: No Stress Concern Present (1/31/2024)    [place] [employer] [place] [employer] - Occupational Stress Questionnaire     Feeling of Stress : Not at all   Housing Stability: Low Risk  (1/31/2024)    Housing Stability Vital Sign     Unable to Pay for Housing in the Last Year: No     Number of Places Lived in the Last Year: 1     Unstable Housing in the Last Year: No        Family History   Problem Relation Name Age of Onset    Hypertension Mother      Diabetes Mother      Heart disease Mother      No Known Problems Father          Patient Care Team:  Mariama Rios FNP as PCP - General (Family Medicine)     Subjective:     Review of Systems     See HPI for details    Constitutional: Denies Change in appetite. Denies Chills. Denies Fever. Denies Night sweats.  Eye: Denies Blurred vision. Denies Discharge. Denies Eye pain.  ENT: Denies Decreased hearing. Denies Sore throat. Denies Swollen glands.  Respiratory: Denies Cough. Denies  "Shortness of breath. Denies Shortness of breath with exertion. Denies Wheezing.  Cardiovascular: DeniesChest pain at rest. Denies Chest pain with exertion. Denies Irregular heartbeat. Denies Palpitations. Denies Edema.  Gastrointestinal: Denies Abdominal pain. DeniesDiarrhea. Denies Nausea. Denies Vomiting. Denies Hematemesis or Hematochezia.  Genitourinary: Denies Dysuria. Denies Urinary frequency. Denies Urinary urgency. Denies Blood in urine.  Endocrine: Denies Cold intolerance. Denies Excessive thirst. Denies Heat intolerance. Denies Weight loss. Denies Weight gain.  Musculoskeletal: Denies Painful joints. Denies Weakness.  Integumentary: Denies Rash. Denies Itching. Denies Dry skin.  Neurologic: Denies Dizziness. Denies Fainting. Denies Headache.  Psychiatric: Denies Depression. Denies Anxiety. Denies Suicidal/Homicidal ideations.    All Other ROS: Negative except as stated in HPI.       Objective:     Visit Vitals  /77 (BP Location: Right arm, Patient Position: Sitting)   Pulse 90   Temp 97.5 °F (36.4 °C) (Oral)   Resp 18   Ht 5' 3" (1.6 m)   Wt 114.8 kg (253 lb)   BMI 44.82 kg/m²       Physical Exam    General: Alert and oriented, No acute distress.  Head: Normocephalic, Atraumatic.  Eye: Pupils are equal, round and reactive to light, Extraocular movements are intact, Sclera non-icteric.  Ears/Nose/Throat: Normal, Mucosa moist,Clear.  Neck/Thyroid: Supple, Non-tender, No carotid bruit, No lymphadenopathy, No JVD, Full range of motion.  Respiratory: Clear to auscultation bilaterally; No wheezes, rales or rhonchi,Non-labored respirations, Symmetrical chest wall expansion.  Cardiovascular: Regular rate and rhythm, S1/S2 normal, No murmurs, rubs or gallops.  Gastrointestinal: Soft, Non-tender, Non-distended, Normal bowel sounds, No palpable organomegaly.  Musculoskeletal: Normal range of motion.  Integumentary: Warm, Dry, Intact, No suspicious lesions or rashes.  Extremities: No clubbing, cyanosis or " edema  Neurologic: No focal deficits, Cranial Nerves II-XII are grossly intact, Motor strength normal upper and lower extremities, Sensory exam intact.  Psychiatric: Normal interaction, Coherent speech, Euthymic mood, Appropriate affect       Labs Reviewed:     Chemistry:  Lab Results   Component Value Date     11/08/2024    K 6.0 (H) 11/08/2024    BUN 36.5 (H) 11/08/2024    CREATININE 1.53 (H) 11/08/2024    EGFRNORACEVR 35 11/08/2024    GLUCOSE 139 (H) 11/08/2024    CALCIUM 10.1 11/08/2024    ALKPHOS 71 10/18/2024    LABPROT 7.4 10/18/2024    ALBUMIN 3.8 10/18/2024    BILIDIR 0.3 02/21/2023    IBILI 0.40 02/21/2023    AST 12 10/18/2024    ALT 12 10/18/2024    [id number] 54.5 02/29/2024        Lab Results   Component Value Date    HGBA1C 8.2 (H) 11/08/2024        Hematology:  Lab Results   Component Value Date    WBC 4.63 04/22/2024    HGB 13.1 04/22/2024    HCT 40.2 04/22/2024     04/22/2024       Lipid Panel:  Lab Results   Component Value Date    CHOL 308 (H) 11/08/2024    HDL 55 11/08/2024    .00 (H) 11/08/2024    TRIG 216 (H) 11/08/2024    TOTALCHOLEST 6 (H) 11/08/2024        Urine:  Lab Results   Component Value Date    APPEARANCEUA Clear 10/18/2024    SGUA 1.015 10/18/2024    PROTEINUA Negative 10/18/2024    KETONESUA Negative 10/18/2024    LEUKOCYTESUR Negative 10/18/2024    RBCUA None Seen 10/18/2024    WBCUA None Seen 10/18/2024    BACTERIA Rare 10/18/2024    SQEPUA Occ (A) 04/22/2024    HYALINECASTS None Seen 04/22/2024    CREATRANDUR 52.4 10/18/2024    PROTEINURINE <6.8 10/18/2024        Assessment:       ICD-10-CM ICD-9-CM   1. Hyperkalemia  E87.5 276.7   2. Controlled type 2 diabetes mellitus with stage 3 chronic kidney disease, without long-term current use of insulin  E11.22 250.40    N18.30 585.3   3. Mixed hyperlipidemia  E78.2 272.2   4. Type 2 diabetes mellitus with stage 3b chronic kidney disease, without long-term current use of insulin  E11.22 250.40    N18.32 585.3   5.  Obesity, unspecified class, unspecified obesity type, unspecified whether serious comorbidity present  E66.9 278.00        Plan:     1. Hyperkalemia (Primary)  Pt states she had stopped Farxiga instead of spironolactone per renal clinic recommendation. Informed pt I was notified to inform pt to D/C spironolactone due to hyperkalemia not Farxiga. Pt denies s/s of hyperkalemia. Pt will D/C Spironolactone today and will return in 1 week to repeat BMP.     2. Controlled type 2 diabetes mellitus with stage 3 chronic kidney disease, without long-term current use of insulin  A1c 8.2. ADA diet and exercise. Pt had stopped Farxiga instead of spironolactone which is cause of increase in A1c level. Pt will start back taking Farxiga as previously prescribed. Will repeat A1c in 3 months. Urine microalbumin 3-5-24. DM foot 2-24-24. DM eye 6-3-24.     3. Mixed hyperlipidemia  Chol 308, Trigs 216, . Encouraged low fat diet and exercise- encouraged stricter dietary control. Cont Pravastatin as prescribed. FLP in 6 months.     4. Type 2 diabetes mellitus with stage 3b chronic kidney disease, without long-term current use of insulin  A1c 8.2. ADA diet and exercise encouraged. Pt states she stopped Farxiga per renal recommendation however renal informed her to stop spirolactone not farxiga. Pt will stop spirolactone and restart Farxiga. Will repeat A1c in 3 months. Urine microalbumin 3-5-24. DM foot 2-24-24. DM eye 6-3-24.     5. Obesity, unspecified class, unspecified obesity type, unspecified whether serious comorbidity present  BMI 44. Encouraged low fat diet and exercise. Education provided.          Follow up in about 3 months (around 2/22/2025) for with labs 1 week prior to appt. . In addition to their scheduled follow up, the patient has also been instructed to follow up on as needed basis.     Future Appointments   Date Time Provider Department Center   1/15/2025 10:15 AM Shikha Ramirez FNP [facility] NEPHR Justin [facility]    6/16/2025 10:00 AM Mariana Whelan MD [facility] [place]

## 2024-11-29 ENCOUNTER — LAB VISIT (OUTPATIENT)
Dept: LAB | Facility: HOSPITAL | Age: 74
End: 2024-11-29
Attending: NURSE PRACTITIONER
Payer: MEDICARE

## 2024-11-29 DIAGNOSIS — E87.5 HYPERKALEMIA: ICD-10-CM

## 2024-11-29 LAB
ANION GAP SERPL CALC-SCNC: 6 MEQ/L
BUN SERPL-MCNC: 38.9 MG/DL (ref 9.8–20.1)
CALCIUM SERPL-MCNC: 9.4 MG/DL (ref 8.4–10.2)
CHLORIDE SERPL-SCNC: 105 MMOL/L (ref 98–107)
CO2 SERPL-SCNC: 28 MMOL/L (ref 23–31)
CREAT SERPL-MCNC: 1.44 MG/DL (ref 0.55–1.02)
CREAT/UREA NIT SERPL: 27
GFR SERPLBLD CREATININE-BSD FMLA CKD-EPI: 38 ML/MIN/1.73/M2
GLUCOSE SERPL-MCNC: 144 MG/DL (ref 82–115)
POTASSIUM SERPL-SCNC: 4.8 MMOL/L (ref 3.5–5.1)
SODIUM SERPL-SCNC: 139 MMOL/L (ref 136–145)

## 2024-11-29 PROCEDURE — 80048 BASIC METABOLIC PNL TOTAL CA: CPT

## 2024-11-29 PROCEDURE — 36415 COLL VENOUS BLD VENIPUNCTURE: CPT

## 2025-01-14 RX ORDER — SPIRONOLACTONE 25 MG/1
TABLET ORAL
Qty: 90 TABLET | Refills: 3 | OUTPATIENT
Start: 2025-01-14

## 2025-01-15 ENCOUNTER — OFFICE VISIT (OUTPATIENT)
Dept: NEPHROLOGY | Facility: CLINIC | Age: 75
End: 2025-01-15
Payer: MEDICARE

## 2025-01-15 VITALS
BODY MASS INDEX: 46.6 KG/M2 | SYSTOLIC BLOOD PRESSURE: 136 MMHG | OXYGEN SATURATION: 100 % | RESPIRATION RATE: 18 BRPM | HEART RATE: 83 BPM | DIASTOLIC BLOOD PRESSURE: 86 MMHG | WEIGHT: 263 LBS | TEMPERATURE: 98 F | HEIGHT: 63 IN
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
## 2025-01-15 DIAGNOSIS — E66.01 SEVERE OBESITY (BMI >= 40): ICD-10-CM

## 2025-01-15 DIAGNOSIS — E11.22 TYPE 2 DIABETES MELLITUS WITH STAGE 3B CHRONIC KIDNEY DISEASE, WITHOUT LONG-TERM CURRENT USE OF INSULIN: ICD-10-CM

## 2025-01-15 DIAGNOSIS — N18.32 TYPE 2 DIABETES MELLITUS WITH STAGE 3B CHRONIC KIDNEY DISEASE, WITHOUT LONG-TERM CURRENT USE OF INSULIN: ICD-10-CM

## 2025-01-15 DIAGNOSIS — Z91.89 AT HIGH RISK FOR HYPERKALEMIA: ICD-10-CM

## 2025-01-15 DIAGNOSIS — N18.32 CKD STAGE G3B/A1, GFR 30-44 AND ALBUMIN CREATININE RATIO <30 MG/G: Primary | ICD-10-CM

## 2025-01-15 DIAGNOSIS — I10 PRIMARY HYPERTENSION: ICD-10-CM

## 2025-01-15 PROCEDURE — 3075F SYST BP GE 130 - 139MM HG: CPT | Mod: CPTII,,, | Performed by: NURSE PRACTITIONER

## 2025-01-15 PROCEDURE — 1160F RVW MEDS BY RX/DR IN RCRD: CPT | Mod: CPTII,,, | Performed by: NURSE PRACTITIONER

## 2025-01-15 PROCEDURE — 3079F DIAST BP 80-89 MM HG: CPT | Mod: CPTII,,, | Performed by: NURSE PRACTITIONER

## 2025-01-15 PROCEDURE — 99214 OFFICE O/P EST MOD 30 MIN: CPT | Mod: S$PBB,,, | Performed by: NURSE PRACTITIONER

## 2025-01-15 PROCEDURE — 1159F MED LIST DOCD IN RCRD: CPT | Mod: CPTII,,, | Performed by: NURSE PRACTITIONER

## 2025-01-15 PROCEDURE — 1126F AMNT PAIN NOTED NONE PRSNT: CPT | Mod: CPTII,,, | Performed by: NURSE PRACTITIONER

## 2025-01-15 PROCEDURE — 99215 OFFICE O/P EST HI 40 MIN: CPT | Mod: PBBFAC | Performed by: NURSE PRACTITIONER

## 2025-01-15 PROCEDURE — 3288F FALL RISK ASSESSMENT DOCD: CPT | Mod: CPTII,,, | Performed by: NURSE PRACTITIONER

## 2025-01-15 PROCEDURE — 1101F PT FALLS ASSESS-DOCD LE1/YR: CPT | Mod: CPTII,,, | Performed by: NURSE PRACTITIONER

## 2025-01-15 RX ORDER — INSULIN PUMP SYRINGE, 3 ML
EACH MISCELLANEOUS
Qty: 1 EACH | Refills: 0 | Status: SHIPPED | OUTPATIENT
Start: 2025-01-15

## 2025-01-15 NOTE — PROGRESS NOTES
"[facility]  Nephrology Clinic Note    Chief complaint: Chronic Kidney Disease (Follow up)    History of present illness:   Fabiola Mejia is a 74 y.o. Black or  female with past medical history of chronic kidney disease, non-insulin dependent diabetes mellitus type 2 diagnosed greater than 20 years ago, hypertension, fibromyalgia, dyslipidemia, osteopenia, and obesity.  Presents for follow-up appointment in Nephrology Clinic today.  States she fell at home last week.     Review of Systems  12 point review of systems conducted, negative except as stated in the history of present illness.    Allergies: Patient is allergic to codeine, gabapentin, hydroxychloroquine, methocarbamol, acetaminophen-codeine, and tramadol.     Past Medical History:  has a past medical history of CKD (chronic kidney disease) stage 3, GFR 30-59 ml/min, Connective tissue disorder, Degenerative arthritis of knee, bilateral, Diabetes mellitus due to underlying condition with stage 3 chronic kidney disease, without long-term current use of insulin, unspecified whether stage 3a or 3b CKD, Fibromyalgia, HLD (hyperlipidemia), HTN (hypertension), and Osteopenia.    Procedure History:  has a past surgical history that includes Tonsillectomy.    Family History: family history includes Diabetes in her mother; Heart disease in her mother; Hypertension in her mother; No Known Problems in her father.    Social History:  reports that she has never smoked. She has never used smokeless tobacco. She reports that she does not drink alcohol and does not use drugs.    Physical exam  /86 (BP Location: Right arm, Patient Position: Sitting)   Pulse 83   Temp 97.8 °F (36.6 °C) (Oral)   Resp 18   Ht 5' 3" (1.6 m)   Wt 119.3 kg (263 lb)   SpO2 100%   BMI 46.59 kg/m²   General appearance: Patient is in no acute distress.  Skin: No rashes or wounds.  HEENT: PERRLA, EOMI, no scleral icterus, no JVD. Neck is " supple.  Chest: Respirations are unlabored. Lungs sounds are clear.   Heart: S1, S2.   Abdomen: Benign.  Obese  : Deferred.  Extremities:  Bilateral lower extremity pitting 1+ edema, peripheral pulses are palpable.   Neuro: No focal deficits.     Home Medications:    Current Outpatient Medications:     dapagliflozin propanediol (FARXIGA) 5 mg Tab tablet, Take 1 tablet (5 mg total) by mouth once daily., Disp: 90 tablet, Rfl: 1    desonide (DESOWEN) 0.05 % cream, , Disp: , Rfl:     diclofenac sodium (VOLTAREN) 1 % Gel, Apply 4 g topically 4 (four) times daily as needed (pain)., Disp: 450 g, Rfl: 4    glipiZIDE (GLUCOTROL) 5 MG tablet, Take 1 tab po daily., Disp: 90 tablet, Rfl: 1    lancets Misc, To check BG 1 times daily, to use with insurance preferred meter, Disp: 100 each, Rfl: 11    multivitamin (THERAGRAN) per tablet, Take 1 tablet by mouth once daily., Disp: , Rfl:     pantoprazole (PROTONIX) 40 MG tablet, Take 40 mg by mouth once daily., Disp: , Rfl:     pioglitazone (ACTOS) 15 MG tablet, Take 1 tab po daily., Disp: 90 tablet, Rfl: 1    pravastatin (PRAVACHOL) 40 MG tablet, Take 1 tab po Q hs., Disp: 90 tablet, Rfl: 1    UNABLE TO FIND, Take 1 capsule by mouth once daily. medication name: tumeric curcumin with ginger powder, Disp: , Rfl:     VITAMIN A ORAL, Take 2,400 mcg by mouth Daily., Disp: , Rfl:     vitamin E 400 UNIT capsule, Take 400 Units by mouth once daily., Disp: , Rfl:     blood sugar diagnostic Strp, To check BG 1 times daily, to use with insurance preferred meter, Disp: 100 strip, Rfl: 11    blood-glucose meter kit, To check BG 1 times daily, to use with insurance preferred meter, Disp: 1 each, Rfl: 0    clobetasoL (TEMOVATE) 0.05 % cream, , Disp: , Rfl:     ergocalciferol (ERGOCALCIFEROL) 50,000 unit Cap, Take 50,000 Units by mouth every 7 days. (Patient not taking: Reported on 1/15/2025), Disp: , Rfl:     methylPREDNISolone (MEDROL DOSEPACK) 4 mg tablet, use as directed (Patient not taking:  Reported on 9/9/2024), Disp: 21 each, Rfl: 0    Laboratory data    Lab Results   Component Value Date    WBC 4.63 04/22/2024    HGB 13.1 04/22/2024    HCT 40.2 04/22/2024     04/22/2024     11/29/2024    K 4.8 11/29/2024    CO2 28 11/29/2024    BUN 38.9 (H) 11/29/2024    CREATININE 1.44 (H) 11/29/2024    EGFRNORACEVR 38 11/29/2024    GLUCOSE 144 (H) 11/29/2024    CALCIUM 9.4 11/29/2024    ALKPHOS 71 10/18/2024    LABPROT 7.4 10/18/2024    ALBUMIN 3.8 10/18/2024    BILIDIR 0.3 02/21/2023    IBILI 0.40 02/21/2023    AST 12 10/18/2024    ALT 12 10/18/2024      Lab Results   Component Value Date    HGBA1C 8.2 (H) 11/08/2024    .8 (H) 10/18/2024    SGOJUMSL67DX 54.5 02/29/2024    HEPCAB Nonreactive 07/22/2021     Urine:  Lab Results   Component Value Date    APPEARANCEUA Clear 10/18/2024    SGUA 1.015 10/18/2024    PROTEINUA Negative 10/18/2024    KETONESUA Negative 10/18/2024    LEUKOCYTESUR Negative 10/18/2024    RBCUA None Seen 10/18/2024    WBCUA None Seen 10/18/2024    BACTERIA Rare 10/18/2024    SQEPUA Occ (A) 04/22/2024    HYALINECASTS None Seen 04/22/2024    CREATRANDUR 52.4 10/18/2024    PROTEINURINE <6.8 10/18/2024         Imaging  Ultrasound of the abdomen 11/29/2023:  FINDINGS: EXAM IS LIMITED DUE TO PATIENT'S BODY HABITUS  Liver:  Size: 17.9 cm in the right midclavicular line, normal  Appearance: There is increased echogenicity of the liver parenchyma increased echogenicity decreases sensitivity to detect focal lesions  Mass: No focal masses  Gallbladder:  Stones/Sludge: None  Appearance: No wall thickening, pericholecystic fluid or hydrops  Sonographic Pan's Sign: Negative  Bile Ducts:  Intrahepatic Ducts: No dilatation  Extrahepatic Ducts: Common bile duct measures 0.20 cm, no dilatation  Pancreas:   Visualized portions of the pancreas are normal.   Both kidneys are of normal size shape and contour with no abnormal masses or hydronephrosis.  Subcentimeter cyst identified in the right  kidney measuring 7 mm x 8 mm x 7 mm   Spleen is unremarkable   Vessels:  Aorta: Visualized portions are normal.   Inferior Vena Cava: Visualized portions are normal.   Main Portal Vein: Patent with hepatopedal  flow.   Free Fluid:  No ascites or pleural effusions  Impression:   Limited exam due to patient's body habitus.   Hepatic steatosis.   Cyst of the right kidney    Impression    ICD-10-CM ICD-9-CM   1. CKD stage G3b/A1, GFR 30-44 and albumin creatinine ratio <30 mg/g  N18.32 585.3   2. At high risk for hyperkalemia  Z91.89 [id number]   3. Type 2 diabetes mellitus with stage 3b chronic kidney disease, without long-term current use of insulin  E11.22 250.40    N18.32 585.3   4. Primary hypertension  I10 401.9   5. Severe obesity (BMI >= 40)  E66.01 278.01   6. Fall, initial encounter  W19.XXXA E888.9        Plan  CKD stage G3b/A1, GFR 30-44 and albumin creatinine ratio <30 mg/g  -     Comprehensive Metabolic Panel; Future; Expected date: 05/10/2025  -     Protein/Creatinine Ratio, Urine; Future; Expected date: 05/10/2025  -     Urinalysis, Reflex to Urine Culture; Future; Expected date: 05/10/2025  -     CBC Auto Differential; Future; Expected date: 05/10/2025  Diabetic kidney disease.  Imaging of the kidneys was unremarkable.  There is no significant proteinuria.  Continue risk factor management and SGLT2i.  MRA was discontinued due to acute kidney injury and hyperkalemia.      At high risk for hyperkalemia  MRA was discontinued, patient is now eukalemic.  Continue periodic monitoring.      Type 2 diabetes mellitus with stage 3b chronic kidney disease, without long-term current use of insulin  -     blood-glucose meter kit; To check BG 1 times daily, to use with insurance preferred meter  Dispense: 1 each; Refill: 0  -     blood sugar diagnostic Strp; To check BG 1 times daily, to use with insurance preferred meter  Dispense: 100 strip; Refill: 11  A1c is above goal.  Patient states she lost her meter over  [place]. Will issue a new prescription for glucometer. Importance of adhering to diabetic diet discussed.     Primary hypertension  Blood pressure reading is at goal, continue current antihypertensive regimen and 2 g a day dietary sodium restriction.      Severe obesity (BMI >= 40)  -     Ambulatory Referral/Consult to Physical Therapy; Future; Expected date: 01/22/2025  Lifestyle and dietary interventions discussed, patient encouraged to maintain non-sedentary lifestyle and well-balanced diet.     Fall, initial encounter  -     Ambulatory Referral/Consult to Physical Therapy; Future; Expected date: 01/22/2025  Will refer patient to outpatient PT for gait training/strengthening and balance training to help prevent future falls.       Follow up in about 4 months (around 5/15/2025).         Shikha Ramirez NP  [facility]

## 2025-02-03 ENCOUNTER — OFFICE VISIT (OUTPATIENT)
Dept: FAMILY MEDICINE | Facility: CLINIC | Age: 75
End: 2025-02-03
Payer: MEDICARE

## 2025-02-03 VITALS
DIASTOLIC BLOOD PRESSURE: 83 MMHG | SYSTOLIC BLOOD PRESSURE: 144 MMHG | TEMPERATURE: 97 F | OXYGEN SATURATION: 97 % | RESPIRATION RATE: 15 BRPM | BODY MASS INDEX: 46.34 KG/M2 | WEIGHT: 261.63 LBS | HEART RATE: 86 BPM

## 2025-02-03 DIAGNOSIS — Z23 NEED FOR INFLUENZA VACCINATION: ICD-10-CM

## 2025-02-03 DIAGNOSIS — M54.6 BILATERAL THORACIC BACK PAIN, UNSPECIFIED CHRONICITY: ICD-10-CM

## 2025-02-03 DIAGNOSIS — E78.2 MIXED HYPERLIPIDEMIA: Primary | ICD-10-CM

## 2025-02-03 DIAGNOSIS — N18.32 TYPE 2 DIABETES MELLITUS WITH STAGE 3B CHRONIC KIDNEY DISEASE, WITHOUT LONG-TERM CURRENT USE OF INSULIN: ICD-10-CM

## 2025-02-03 DIAGNOSIS — M17.0 PRIMARY OSTEOARTHRITIS OF BOTH KNEES: ICD-10-CM

## 2025-02-03 DIAGNOSIS — N18.32 STAGE 3B CHRONIC KIDNEY DISEASE: ICD-10-CM

## 2025-02-03 DIAGNOSIS — E11.22 TYPE 2 DIABETES MELLITUS WITH STAGE 3B CHRONIC KIDNEY DISEASE, WITHOUT LONG-TERM CURRENT USE OF INSULIN: ICD-10-CM

## 2025-02-03 DIAGNOSIS — Z12.12 SCREENING FOR COLORECTAL CANCER: ICD-10-CM

## 2025-02-03 DIAGNOSIS — Z12.11 SCREENING FOR COLORECTAL CANCER: ICD-10-CM

## 2025-02-03 DIAGNOSIS — I10 PRIMARY HYPERTENSION: ICD-10-CM

## 2025-02-03 PROBLEM — M25.469 EFFUSION OF KNEE: Status: RESOLVED | Noted: 2025-02-03 | Resolved: 2025-02-03

## 2025-02-03 PROBLEM — M06.9 RHEUMATOID ARTHRITIS, INVOLVING UNSPECIFIED SITE, UNSPECIFIED WHETHER RHEUMATOID FACTOR PRESENT: Status: RESOLVED | Noted: 2024-09-09 | Resolved: 2025-02-03

## 2025-02-03 PROBLEM — M67.919 DISORDER OF ROTATOR CUFF: Status: RESOLVED | Noted: 2025-02-03 | Resolved: 2025-02-03

## 2025-02-03 PROBLEM — S76.019A STRAIN OF FLEXOR MUSCLE OF HIP: Status: RESOLVED | Noted: 2025-02-03 | Resolved: 2025-02-03

## 2025-02-03 LAB
ALBUMIN SERPL-MCNC: 3.5 G/DL (ref 3.4–4.8)
ALBUMIN/GLOB SERPL: 0.9 RATIO (ref 1.1–2)
ALP SERPL-CCNC: 81 UNIT/L (ref 40–150)
ALT SERPL-CCNC: 19 UNIT/L (ref 0–55)
ANION GAP SERPL CALC-SCNC: 9 MEQ/L
AST SERPL-CCNC: 14 UNIT/L (ref 5–34)
BASOPHILS # BLD AUTO: 0.02 X10(3)/MCL
BASOPHILS NFR BLD AUTO: 0.3 %
BILIRUB SERPL-MCNC: 0.7 MG/DL
BUN SERPL-MCNC: 25.6 MG/DL (ref 9.8–20.1)
CALCIUM SERPL-MCNC: 9.8 MG/DL (ref 8.4–10.2)
CHLORIDE SERPL-SCNC: 102 MMOL/L (ref 98–107)
CHOLEST SERPL-MCNC: 299 MG/DL
CHOLEST/HDLC SERPL: 6 {RATIO} (ref 0–5)
CO2 SERPL-SCNC: 29 MMOL/L (ref 23–31)
CREAT SERPL-MCNC: 1.48 MG/DL (ref 0.55–1.02)
CREAT/UREA NIT SERPL: 17
EOSINOPHIL # BLD AUTO: 0.14 X10(3)/MCL (ref 0–0.9)
EOSINOPHIL NFR BLD AUTO: 2 %
ERYTHROCYTE [DISTWIDTH] IN BLOOD BY AUTOMATED COUNT: 14.9 % (ref 11.5–17)
EST. AVERAGE GLUCOSE BLD GHB EST-MCNC: 240.3 MG/DL
GFR SERPLBLD CREATININE-BSD FMLA CKD-EPI: 37 ML/MIN/1.73/M2
GLOBULIN SER-MCNC: 3.7 GM/DL (ref 2.4–3.5)
GLUCOSE SERPL-MCNC: 251 MG/DL (ref 82–115)
HBA1C MFR BLD: 10 %
HCT VFR BLD AUTO: 44 % (ref 37–47)
HDLC SERPL-MCNC: 52 MG/DL (ref 35–60)
HGB BLD-MCNC: 13.8 G/DL (ref 12–16)
IMM GRANULOCYTES # BLD AUTO: 0.01 X10(3)/MCL (ref 0–0.04)
IMM GRANULOCYTES NFR BLD AUTO: 0.1 %
LDLC SERPL CALC-MCNC: 187 MG/DL (ref 50–140)
LYMPHOCYTES # BLD AUTO: 2.04 X10(3)/MCL (ref 0.6–4.6)
LYMPHOCYTES NFR BLD AUTO: 28.9 %
MCH RBC QN AUTO: 30.1 PG (ref 27–31)
MCHC RBC AUTO-ENTMCNC: 31.4 G/DL (ref 33–36)
MCV RBC AUTO: 95.9 FL (ref 80–94)
MONOCYTES # BLD AUTO: 0.51 X10(3)/MCL (ref 0.1–1.3)
MONOCYTES NFR BLD AUTO: 7.2 %
NEUTROPHILS # BLD AUTO: 4.35 X10(3)/MCL (ref 2.1–9.2)
NEUTROPHILS NFR BLD AUTO: 61.5 %
PLATELET # BLD AUTO: 168 X10(3)/MCL (ref 130–400)
PMV BLD AUTO: 11.9 FL (ref 7.4–10.4)
POTASSIUM SERPL-SCNC: 4.9 MMOL/L (ref 3.5–5.1)
PROT SERPL-MCNC: 7.2 GM/DL (ref 5.8–7.6)
RBC # BLD AUTO: 4.59 X10(6)/MCL (ref 4.2–5.4)
SODIUM SERPL-SCNC: 140 MMOL/L (ref 136–145)
TRIGL SERPL-MCNC: 298 MG/DL (ref 37–140)
VLDLC SERPL CALC-MCNC: 60 MG/DL
WBC # BLD AUTO: 7.07 X10(3)/MCL (ref 4.5–11.5)

## 2025-02-03 PROCEDURE — 3288F FALL RISK ASSESSMENT DOCD: CPT | Mod: CPTII,,,

## 2025-02-03 PROCEDURE — G0008 ADMIN INFLUENZA VIRUS VAC: HCPCS | Mod: ,,,

## 2025-02-03 PROCEDURE — 99214 OFFICE O/P EST MOD 30 MIN: CPT | Mod: 25,,,

## 2025-02-03 PROCEDURE — 1101F PT FALLS ASSESS-DOCD LE1/YR: CPT | Mod: CPTII,,,

## 2025-02-03 PROCEDURE — 1159F MED LIST DOCD IN RCRD: CPT | Mod: CPTII,,,

## 2025-02-03 PROCEDURE — 90653 IIV ADJUVANT VACCINE IM: CPT | Mod: ,,,

## 2025-02-03 PROCEDURE — 83036 HEMOGLOBIN GLYCOSYLATED A1C: CPT

## 2025-02-03 PROCEDURE — 36415 COLL VENOUS BLD VENIPUNCTURE: CPT

## 2025-02-03 PROCEDURE — 1160F RVW MEDS BY RX/DR IN RCRD: CPT | Mod: CPTII,,,

## 2025-02-03 PROCEDURE — 85025 COMPLETE CBC W/AUTO DIFF WBC: CPT

## 2025-02-03 PROCEDURE — 80053 COMPREHEN METABOLIC PANEL: CPT

## 2025-02-03 PROCEDURE — 3077F SYST BP >= 140 MM HG: CPT | Mod: CPTII,,,

## 2025-02-03 PROCEDURE — 80061 LIPID PANEL: CPT

## 2025-02-03 PROCEDURE — 1125F AMNT PAIN NOTED PAIN PRSNT: CPT | Mod: CPTII,,,

## 2025-02-03 PROCEDURE — 3079F DIAST BP 80-89 MM HG: CPT | Mod: CPTII,,,

## 2025-02-03 RX ORDER — SEMAGLUTIDE 0.68 MG/ML
0.25 INJECTION, SOLUTION SUBCUTANEOUS
Qty: 3 ML | Refills: 0 | Status: SHIPPED | OUTPATIENT
Start: 2025-02-03 | End: 2025-02-19 | Stop reason: SDUPTHER

## 2025-02-03 RX ORDER — LANCETS
EACH MISCELLANEOUS
Qty: 120 EACH | Refills: 11 | Status: SHIPPED | OUTPATIENT
Start: 2025-02-03

## 2025-02-03 RX ORDER — INSULIN PUMP SYRINGE, 3 ML
EACH MISCELLANEOUS
Qty: 1 EACH | Refills: 0 | Status: SHIPPED | OUTPATIENT
Start: 2025-02-03

## 2025-02-03 RX ORDER — HYDROCHLOROTHIAZIDE 12.5 MG/1
12.5 TABLET ORAL DAILY
Qty: 30 TABLET | Refills: 11 | Status: SHIPPED | OUTPATIENT
Start: 2025-02-03 | End: 2025-02-06 | Stop reason: SDUPTHER

## 2025-02-03 NOTE — ASSESSMENT & PLAN NOTE
Repeat A1c today.  A1c was above goal 3 months ago.  We will add Ozempic to assist with weight loss.  Continue glipizide, Actos, Farxiga for now.    Lab Results   Component Value Date    HGBA1C 8.2 (H) 11/08/2024    HGBA1C 6.3 04/22/2024    .00 (H) 11/08/2024    CREATININE 1.44 (H) 11/29/2024     On ACE and Statin according to guidelines.  Discussed caution with SGLT2s + diuretics as concomitant use can cause volume depletion. Discussed caution with DPP-Ivs and HF risk.  Follow ADA Diet. Avoid soda, sweets, and limit carbs (no more than 45-50 grams per meal).  Maintain healthy weight with goal BMI <30.  Exercise 5 times per week for 30 minutes per day.  Stressed importance of daily foot exams.  Up to date on DM retinal and foot exams.

## 2025-02-03 NOTE — ASSESSMENT & PLAN NOTE
Bilateral knee osteoarthritis on x-rays in September 2024.  Voltaren gel did not help her pain.  Patient can not take NSAIDs due to chronic kidney disease.  Advised to take Tylenol as needed for pain.  We will refer to PT for her knee pain and back pain.

## 2025-02-03 NOTE — PROGRESS NOTES
FAMILY MEDICINE Clinic  Hanna Gupta MD    Patient ID: [id number]     Chief Complaint: Establish Care      HPI:     Fabiola Mejia is a 74 y.o. female here today to establish care.    For the last month, patient has had pain that radiates from her mid back around to bilateral flanks.  It bothers her when she walks around.  Patient denied any injury.    Medical conditions:  Type 2 diabetes  Stage IIIB CKD  Hypertension  Dyslipidemia  Osteopenia  Obesity  Connective tissue disorder    Surgical history:  Cataract BL   Tonsillectomy    Obstetrics: G0  Menopause in her 30s    GYN history:  Last menstrual period - 29 years old    Social history:  Tobacco - quit 30 years ago  Alcohol - none   Illicit substances - none      Healthcare Maintenance  Colon cancer screening: never done    Breast cancer screenin25, negative   Cervical cancer screening: complete     Past Medical History:   Diagnosis Date    Connective tissue disorder     Degenerative arthritis of knee, bilateral     Disorder of rotator cuff 2025    Effusion of knee 2025    Fibromyalgia     Osteopenia     Strain of flexor muscle of hip 2025        Past Surgical History:   Procedure Laterality Date    TONSILLECTOMY          Social History     Tobacco Use    Smoking status: Never    Smokeless tobacco: Never   Substance and Sexual Activity    Alcohol use: Never    Drug use: Never    Sexual activity: Not Currently        Current Outpatient Medications   Medication Instructions    blood sugar diagnostic Strp To check BG 1 times daily, to use with insurance preferred meter    blood sugar diagnostic Strp To check BG four times daily, to use with insurance preferred meter    blood-glucose meter kit To check BG 1 times daily, to use with insurance preferred meter    blood-glucose meter kit To check BG four times daily, to use with insurance preferred meter    clobetasoL (TEMOVATE) 0.05 % cream     dapagliflozin propanediol (FARXIGA) 5 mg,  Aware   C diff stool study negative     "Oral, Daily    desonide (DESOWEN) 0.05 % cream     glipiZIDE (GLUCOTROL) 5 MG tablet Take 1 tab po daily.    hydroCHLOROthiazide 12.5 mg, Oral, Daily    lancets Misc To check BG 1 times daily, to use with insurance preferred meter    lancets Misc To check BG four times daily, to use with insurance preferred meter    multivitamin (THERAGRAN) per tablet 1 tablet, Daily    OZEMPIC 0.25 mg, Subcutaneous, Every 7 days    pantoprazole (PROTONIX) 40 mg, Daily    pioglitazone (ACTOS) 15 MG tablet Take 1 tab po daily.    pravastatin (PRAVACHOL) 40 MG tablet Take 1 tab po Q hs.    UNABLE TO FIND 1 capsule, Daily    UNABLE TO FIND 850 mg, Daily    VITAMIN A ORAL 2,400 mcg, Daily    vitamin E 400 Units, Daily       Review of patient's allergies indicates:   Allergen Reactions    Codeine Other (See Comments)    Gabapentin     Hydroxychloroquine      Other Reaction(s): Loose bowel movement    Methocarbamol      Other Reaction(s): makes her sleepy and feel "funny"    Acetaminophen-codeine Other (See Comments)    Tramadol Rash        Patient Care Team:  Hanna Gupta MD as PCP - General (Family Medicine)  Shikha Ramirez FNP as Nurse Practitioner (Nephrology)  Mariana Whelan MD (Rheumatology)     Subjective:     Review of Systems    12 point review of systems conducted, negative except as stated in the history of present illness. See HPI for details.    Objective:     Visit Vitals  BP (!) 144/83   Pulse 86   Temp 97.3 °F (36.3 °C) (Oral)   Resp 15   Wt 118.7 kg (261 lb 9.6 oz)   SpO2 97%   BMI 46.34 kg/m²       Physical Exam  Vitals and nursing note reviewed.   Constitutional:       General: She is not in acute distress.     Appearance: She is not ill-appearing.   HENT:      Head: Normocephalic and atraumatic.   Eyes:      General: No scleral icterus.     Extraocular Movements: Extraocular movements intact.      Conjunctiva/sclera: Conjunctivae normal.   Cardiovascular:      Rate and Rhythm: Normal rate and regular rhythm.      " Heart sounds: No murmur heard.     No friction rub. No gallop.   Pulmonary:      Effort: Pulmonary effort is normal. No respiratory distress.      Breath sounds: Normal breath sounds. No wheezing, rhonchi or rales.   Musculoskeletal:         General: Normal range of motion.      Right lower leg: Edema (1+ pitting) present.      Left lower leg: Edema (1+ pitting) present.   Skin:     General: Skin is warm and dry.   Neurological:      General: No focal deficit present.      Mental Status: She is alert.   Psychiatric:         Mood and Affect: Mood normal.         Labs Reviewed:     Chemistry:  Lab Results   Component Value Date     11/29/2024    K 4.8 11/29/2024    BUN 38.9 (H) 11/29/2024    CREATININE 1.44 (H) 11/29/2024    EGFRNORACEVR 38 11/29/2024    GLUCOSE 144 (H) 11/29/2024    CALCIUM 9.4 11/29/2024    ALKPHOS 71 10/18/2024    LABPROT 7.4 10/18/2024    ALBUMIN 3.8 10/18/2024    BILIDIR 0.3 02/21/2023    IBILI 0.40 02/21/2023    AST 12 10/18/2024    ALT 12 10/18/2024    [id number] 54.5 02/29/2024    TSH 2.420 11/08/2024    [id number] 0.78 02/29/2024        Lab Results   Component Value Date    HGBA1C 8.2 (H) 11/08/2024        Hematology:  Lab Results   Component Value Date    WBC 4.63 04/22/2024    HGB 13.1 04/22/2024    HCT 40.2 04/22/2024     04/22/2024       Lipid Panel:  Lab Results   Component Value Date    CHOL 308 (H) 11/08/2024    HDL 55 11/08/2024    .00 (H) 11/08/2024    TRIG 216 (H) 11/08/2024    TOTALCHOLEST 6 (H) 11/08/2024        Urine:  Lab Results   Component Value Date    APPEARANCEUA Clear 10/18/2024    SGUA 1.015 10/18/2024    PROTEINUA Negative 10/18/2024    KETONESUA Negative 10/18/2024    LEUKOCYTESUR Negative 10/18/2024    RBCUA None Seen 10/18/2024    WBCUA None Seen 10/18/2024    BACTERIA Rare 10/18/2024    SQEPUA Occ (A) 04/22/2024    HYALINECASTS None Seen 04/22/2024    CREATRANDUR 52.4 10/18/2024    PROTEINURINE <6.8 10/18/2024        Assessment:       ICD-10-CM  ICD-9-CM   1. Mixed hyperlipidemia  E78.2 272.2   2. Stage 3b chronic kidney disease  N18.32 585.3   3. Type 2 diabetes mellitus with stage 3b chronic kidney disease, without long-term current use of insulin  E11.22 250.40    N18.32 585.3   4. Primary hypertension  I10 401.9   5. Primary osteoarthritis of both knees  M17.0 715.16   6. Bilateral thoracic back pain, unspecified chronicity  M54.6 724.1   7. Screening for colorectal cancer  Z12.11 V76.51    Z12.12 V76.41   8. Need for influenza vaccination  Z23 V04.81        Plan:     1. Mixed hyperlipidemia  Overview:  Pravastatin 40 mg daily    Assessment & Plan:  Patient is far above her LDL goal of <70.  Repeat lipid panel today.  We will add Zetia to her pravastatin if elevated.    Lab Results   Component Value Date    .00 (H) 11/08/2024    TRIG 216 (H) 11/08/2024    HDL 55 11/08/2024    TOTALCHOLEST 6 (H) 11/08/2024     Educated on importance of dietary modifications. Follow a low cholesterol, low saturated fat diet with less that 200mg of cholesterol a day.  Avoid fried foods and high saturated fats (high saturated fats less than 7% of calories).  Increase dietary fiber.  Regular exercise can reduce LDL and raise HDL. Stressed importance of physical activity 5 times per week for 30 minutes per day.       Orders:  -     Lipid Panel  -     Lipid Panel; Future; Expected date: 05/03/2025    2. Stage 3b chronic kidney disease  Assessment & Plan:  Stable.  Repeat CMP today.  Followed by nephrologist, Dr. Ramirez.    Lab Results   Component Value Date    EGFRNORACEVR 38 11/29/2024    EGFRNORACEVR 35 11/08/2024     Follow renoprotective measures including Renal Diet (reduce intake of nuts, peanut butter, milk, cheese, dried beans, peas) and Low Sodium Diet (less than 2 grams per day).  Avoid NSAIDs (Aleve, Mobic, Celebrex, Ibuprofen, Advil, Toradol and Diclofenac). May take Tylenol as needed for headache/pain.  Control DM with goal A1C <7. BP goal <130/80. LDL goal  < 100.  Stay well hydrated. Avoid alcohol and soda. Limit tea and coffee.  Smoking Cessation recommended.        3. Type 2 diabetes mellitus with stage 3b chronic kidney disease, without long-term current use of insulin  Overview:  Farxiga 5 mg daily  Glipizide 5 mg daily  Actos 15 mg daily    Assessment & Plan:  Repeat A1c today.  A1c was above goal 3 months ago.  We will add Ozempic to assist with weight loss.  Continue glipizide, Actos, Farxiga for now.    Lab Results   Component Value Date    HGBA1C 8.2 (H) 11/08/2024    HGBA1C 6.3 04/22/2024    .00 (H) 11/08/2024    CREATININE 1.44 (H) 11/29/2024     On ACE and Statin according to guidelines.  Discussed caution with SGLT2s + diuretics as concomitant use can cause volume depletion. Discussed caution with DPP-Ivs and HF risk.  Follow ADA Diet. Avoid soda, sweets, and limit carbs (no more than 45-50 grams per meal).  Maintain healthy weight with goal BMI <30.  Exercise 5 times per week for 30 minutes per day.  Stressed importance of daily foot exams.  Up to date on DM retinal and foot exams.          Orders:  -     CBC Auto Differential  -     Comprehensive Metabolic Panel  -     Hemoglobin A1C  -     semaglutide (OZEMPIC) 0.25 mg or 0.5 mg (2 mg/3 mL) pen injector; Inject 0.25 mg into the skin every 7 days.  Dispense: 3 mL; Refill: 0  -     blood-glucose meter kit; To check BG four times daily, to use with insurance preferred meter  Dispense: 1 each; Refill: 0  -     lancets Misc; To check BG four times daily, to use with insurance preferred meter  Dispense: 120 each; Refill: 11  -     blood sugar diagnostic Strp; To check BG four times daily, to use with insurance preferred meter  Dispense: 120 each; Refill: 11  -     CBC Auto Differential; Future; Expected date: 05/03/2025  -     Comprehensive Metabolic Panel; Future; Expected date: 05/03/2025  -     Hemoglobin A1C; Future; Expected date: 05/03/2025    4. Primary hypertension  Assessment &  Plan:  Patient's blood pressure elevated in clinic on multiple occasions.  We will start hydrochlorothiazide for hypertension and peripheral edema.  CMP in 3 months to check electrolytes and kidney function.  Hypertension Medications               hydroCHLOROthiazide 12.5 MG Tab Take 1 tablet (12.5 mg total) by mouth once daily.          AHA/ACC goal <130/80. JNC8 goal <140/90 (all ages if DM or CKD OR <60), <150/90 (>60 w/o CKD or DM)  Low Sodium Diet (DASH Diet - Less than 2 grams of sodium per day).  Monitor blood pressure daily and log. Report consistent numbers greater than 140/90.  Maintain healthy weight with goal BMI <30. Exercise 30 minutes per day, 5 days per week.  Smoking cessation encouraged to aid in BP reduction.      Orders:  -     hydroCHLOROthiazide 12.5 MG Tab; Take 1 tablet (12.5 mg total) by mouth once daily.  Dispense: 30 tablet; Refill: 11    5. Primary osteoarthritis of both knees  Assessment & Plan:  Bilateral knee osteoarthritis on x-rays in September 2024.  Voltaren gel did not help her pain.  Patient can not take NSAIDs due to chronic kidney disease.  Advised to take Tylenol as needed for pain.  We will refer to PT for her knee pain and back pain.    Orders:  -     Ambulatory Referral/Consult to Physical Therapy/Occupational Therapy; Future; Expected date: 02/10/2025    6. Bilateral thoracic back pain, unspecified chronicity  Assessment & Plan:  Patient with radicular pain radiating from her thoracic spine around to bilateral flank for the last month.  We will obtain x-rays of thoracic spine and refer for PT.    Orders:  -     X-Ray Thoracic Spine 4 or more views; Future; Expected date: 02/03/2025  -     X-Ray Lumbar Spine AP And Lateral; Future; Expected date: 02/03/2025  -     Ambulatory Referral/Consult to Physical Therapy/Occupational Therapy; Future; Expected date: 02/10/2025    7. Screening for colorectal cancer  -     Cologuard Screening (Multitarget Stool DNA); Future; Expected  date: 02/03/2025    8. Need for influenza vaccination  -     influenza (adjuvanted) (Fluad) 45 mcg/0.5 mL IM vaccine (> or = 64 yo) 0.5 mL         Follow up in about 3 months (around 5/3/2025). In addition to their scheduled follow up, the patient has also been instructed to follow up on as needed basis.     Future Appointments   Date Time Provider Department Center   3/19/2025 10:00 AM Mariama Rios FNP [facility] [place]    5/5/2025  1:00 PM Hanna Gupta MD [facility]   5/21/2025 10:30 AM Shikha Ramirez FNP [facility] [place]    6/16/2025 10:00 AM Mariana Whelan MD [facility] [place]         Hanna Gupta MD

## 2025-02-03 NOTE — ASSESSMENT & PLAN NOTE
Stable.  Repeat CMP today.  Followed by nephrologist, Dr. Ramirez.    Lab Results   Component Value Date    EGFRNORACEVR 38 11/29/2024    EGFRNORACEVR 35 11/08/2024     Follow renoprotective measures including Renal Diet (reduce intake of nuts, peanut butter, milk, cheese, dried beans, peas) and Low Sodium Diet (less than 2 grams per day).  Avoid NSAIDs (Aleve, Mobic, Celebrex, Ibuprofen, Advil, Toradol and Diclofenac). May take Tylenol as needed for headache/pain.  Control DM with goal A1C <7. BP goal <130/80. LDL goal < 100.  Stay well hydrated. Avoid alcohol and soda. Limit tea and coffee.  Smoking Cessation recommended.

## 2025-02-03 NOTE — ASSESSMENT & PLAN NOTE
Patient with radicular pain radiating from her thoracic spine around to bilateral flank for the last month.  We will obtain x-rays of thoracic spine and refer for PT.

## 2025-02-03 NOTE — ASSESSMENT & PLAN NOTE
Patient is far above her LDL goal of <70.  Repeat lipid panel today.  We will add Zetia to her pravastatin if elevated.    Lab Results   Component Value Date    .00 (H) 11/08/2024    TRIG 216 (H) 11/08/2024    HDL 55 11/08/2024    TOTALCHOLEST 6 (H) 11/08/2024     Educated on importance of dietary modifications. Follow a low cholesterol, low saturated fat diet with less that 200mg of cholesterol a day.  Avoid fried foods and high saturated fats (high saturated fats less than 7% of calories).  Increase dietary fiber.  Regular exercise can reduce LDL and raise HDL. Stressed importance of physical activity 5 times per week for 30 minutes per day.

## 2025-02-03 NOTE — ASSESSMENT & PLAN NOTE
Patient's blood pressure elevated in clinic on multiple occasions.  We will start hydrochlorothiazide for hypertension and peripheral edema.  CMP in 3 months to check electrolytes and kidney function.  Hypertension Medications               hydroCHLOROthiazide 12.5 MG Tab Take 1 tablet (12.5 mg total) by mouth once daily.          AHA/ACC goal <130/80. JNC8 goal <140/90 (all ages if DM or CKD OR <60), <150/90 (>60 w/o CKD or DM)  Low Sodium Diet (DASH Diet - Less than 2 grams of sodium per day).  Monitor blood pressure daily and log. Report consistent numbers greater than 140/90.  Maintain healthy weight with goal BMI <30. Exercise 30 minutes per day, 5 days per week.  Smoking cessation encouraged to aid in BP reduction.

## 2025-02-04 ENCOUNTER — TELEPHONE (OUTPATIENT)
Dept: FAMILY MEDICINE | Facility: CLINIC | Age: 75
End: 2025-02-04
Payer: MEDICARE

## 2025-02-04 NOTE — TELEPHONE ENCOUNTER
----- Message from Tressa sent at 2/4/2025  9:49 AM CST -----  Who Called: Fabiola Mejia    Caller is requesting assistance/information from provider's office.    Symptoms (please be specific):    How long has patient had these symptoms:    List of preferred pharmacies on file (remove unneeded): [unfilled]  If different, enter pharmacy into here including location and phone number:       Preferred Method of Contact: Phone Call  Patient's Preferred Phone Number on File: [phone]   Best Call Back Number, if different:  Additional Information: Pt called stating she need to speak to nurse about a shot the dr is requesting her to take

## 2025-02-04 NOTE — TELEPHONE ENCOUNTER
[facility] called office stating they have been trying to contact pt to schedule physical therapy. The patient left a VM with them stating she doesn't want the weight loss injections. The referral is not for injections of any kind. It is only for physical therapy to help her move around. This nurse cannot get into contact with patient either as the phone number states the number is not available.

## 2025-02-05 RX ORDER — ACETAMINOPHEN 500 MG
1 TABLET ORAL DAILY
Qty: 1 EACH | Refills: 0 | Status: SHIPPED | OUTPATIENT
Start: 2025-02-05 | End: 2025-02-12 | Stop reason: SDUPTHER

## 2025-02-06 ENCOUNTER — TELEPHONE (OUTPATIENT)
Dept: FAMILY MEDICINE | Facility: CLINIC | Age: 75
End: 2025-02-06
Payer: MEDICARE

## 2025-02-06 DIAGNOSIS — I10 PRIMARY HYPERTENSION: ICD-10-CM

## 2025-02-06 RX ORDER — HYDROCHLOROTHIAZIDE 12.5 MG/1
12.5 TABLET ORAL DAILY
Qty: 7 TABLET | Refills: 0 | Status: CANCELLED | OUTPATIENT
Start: 2025-02-06 | End: 2026-02-06

## 2025-02-06 RX ORDER — HYDROCHLOROTHIAZIDE 12.5 MG/1
12.5 TABLET ORAL DAILY
Qty: 7 TABLET | Refills: 0 | Status: SHIPPED | OUTPATIENT
Start: 2025-02-06 | End: 2025-02-13

## 2025-02-06 NOTE — TELEPHONE ENCOUNTER
----- Message from Heather sent at 2/6/2025 11:29 AM CST -----  .Who Called: Fabiola Mejia    Caller is requesting assistance/information from provider's office.    Symptoms (please be specific): swelling in both feet and legs   How long has patient had these symptoms:  02/06/25  List of preferred pharmacies on file (remove unneeded): [unfilled]  If different, enter pharmacy into here including location and phone number: na      Preferred Method of Contact: Phone Call  Patient's Preferred Phone Number on File: [phone]   Best Call Back Number, if different:  Additional Information: pt called wanting to speak to nurse

## 2025-02-07 RX ORDER — EZETIMIBE 10 MG/1
10 TABLET ORAL DAILY
Qty: 90 TABLET | Refills: 3 | Status: SHIPPED | OUTPATIENT
Start: 2025-02-07 | End: 2026-02-07

## 2025-02-12 ENCOUNTER — TELEPHONE (OUTPATIENT)
Dept: FAMILY MEDICINE | Facility: CLINIC | Age: 75
End: 2025-02-12
Payer: MEDICARE

## 2025-02-12 DIAGNOSIS — I10 PRIMARY HYPERTENSION: Primary | ICD-10-CM

## 2025-02-12 RX ORDER — ACETAMINOPHEN 500 MG
1 TABLET ORAL DAILY
Qty: 1 EACH | Refills: 0 | Status: SHIPPED | OUTPATIENT
Start: 2025-02-12

## 2025-02-12 NOTE — TELEPHONE ENCOUNTER
----- Message from Sonia sent at 2/12/2025 10:02 AM CST -----  .Who Called: Fabiola Mejia    Patient is returning phone call    Who Left Message for Patient:PT  Does the patient know what this is regarding?:PT stated blood pressure machine too expensive at [facility] and wants to get from [facility]         Preferred Method of Contact: Phone Call  Patient's Preferred Phone Number on File: [phone]   Best Call Back Number, if different:  Additional Information: blood pressure monitor Kit  PT stated blood pressure machine too expensive at [facility] and wants to get from [facility] --would like call bc first

## 2025-02-13 ENCOUNTER — HOSPITAL ENCOUNTER (OUTPATIENT)
Dept: RADIOLOGY | Facility: HOSPITAL | Age: 75
Discharge: HOME | End: 2025-02-13
Payer: MEDICARE

## 2025-02-13 DIAGNOSIS — M54.6 BILATERAL THORACIC BACK PAIN, UNSPECIFIED CHRONICITY: ICD-10-CM

## 2025-02-13 PROCEDURE — 72070 X-RAY EXAM THORAC SPINE 2VWS: CPT | Mod: TC

## 2025-02-13 PROCEDURE — 72100 X-RAY EXAM L-S SPINE 2/3 VWS: CPT | Mod: TC

## 2025-02-18 ENCOUNTER — TELEPHONE (OUTPATIENT)
Dept: FAMILY MEDICINE | Facility: CLINIC | Age: 75
End: 2025-02-18
Payer: MEDICARE

## 2025-02-18 NOTE — TELEPHONE ENCOUNTER
Pt wants us to review xrays when you return and give options on anything to help with back pain besides Tylenol. Educated to seek urgent care or ER assistance if pain becomes too severe. She was thankful.

## 2025-02-19 ENCOUNTER — RESULTS FOLLOW-UP (OUTPATIENT)
Dept: FAMILY MEDICINE | Facility: CLINIC | Age: 75
End: 2025-02-19
Payer: MEDICARE

## 2025-02-19 DIAGNOSIS — E11.22 TYPE 2 DIABETES MELLITUS WITH STAGE 3B CHRONIC KIDNEY DISEASE, WITHOUT LONG-TERM CURRENT USE OF INSULIN: ICD-10-CM

## 2025-02-19 DIAGNOSIS — N18.32 TYPE 2 DIABETES MELLITUS WITH STAGE 3B CHRONIC KIDNEY DISEASE, WITHOUT LONG-TERM CURRENT USE OF INSULIN: ICD-10-CM

## 2025-02-19 NOTE — TELEPHONE ENCOUNTER
----- Message from Hazel sent at 2/19/2025  3:16 PM CST -----  Who Called: Fabiola Lopezill or New Rx:RefillRX Name and Strength:semaglutide (OZEMPIC) 0.25 mg or 0.5 mg (2 mg/3 mL) pen injector 3 mLHow is the patient currently taking it? (ex. 1XDay):1 x weeklyIs this a 30 day or 90 day RX:90Local or Mail Order:localList of preferred pharmacies on file (remove unneeded): [unfilled]If different Pharmacy is requested, enter Pharmacy information here including location and phone number: [facility]/pharmacy #[id number] - [facility] [address] AT [facility] Phone: [phone]: [phone] Provider:curetPreferred Method of Contact: Phone CallPatient's Preferred Phone Number on File: [phone] Best Call Back Number, if different:Additional Information: refill request, pt called and stated pharmacy needs approval to refill, please advise. thanks

## 2025-02-19 NOTE — TELEPHONE ENCOUNTER
[facility] will [facility] at [phone] to verify her address for them to send her Ozempic.

## 2025-02-20 DIAGNOSIS — M17.0 PRIMARY OSTEOARTHRITIS OF BOTH KNEES: Primary | ICD-10-CM

## 2025-02-20 DIAGNOSIS — M54.6 BILATERAL THORACIC BACK PAIN, UNSPECIFIED CHRONICITY: ICD-10-CM

## 2025-02-20 RX ORDER — SEMAGLUTIDE 0.68 MG/ML
0.25 INJECTION, SOLUTION SUBCUTANEOUS
Qty: 4.5 ML | Refills: 0 | Status: SHIPPED | OUTPATIENT
Start: 2025-02-20 | End: 2025-05-21

## 2025-02-26 ENCOUNTER — TELEPHONE (OUTPATIENT)
Dept: FAMILY MEDICINE | Facility: CLINIC | Age: 75
End: 2025-02-26
Payer: MEDICARE

## 2025-02-26 NOTE — TELEPHONE ENCOUNTER
----- Message from Symone sent at 2/26/2025  3:00 PM CST -----  Who Called: Fabiola Live is requesting assistance/information from provider's office.Symptoms (please be specific): n/a How long has patient had these symptoms:  n/aList of preferred pharmacies on file (remove unneeded):n/aPreferred Method of Contact: Phone CallPatient's Preferred Phone Number on File: [phone] Best Call Back Number, if different:Additional Information: Pt would like to know how long she has to take semaglutide (OZEMPIC) 0.25 mg or 0.5 mg (2 mg/3 mL) pen injector for. Please advise.

## 2025-03-05 ENCOUNTER — TELEPHONE (OUTPATIENT)
Dept: FAMILY MEDICINE | Facility: CLINIC | Age: 75
End: 2025-03-05
Payer: MEDICARE

## 2025-03-05 NOTE — TELEPHONE ENCOUNTER
----- Message from Heather sent at 3/5/2025  4:12 PM CST -----  .Who Called: Fabiola Live is requesting assistance/information from provider's office.Symptoms (please be specific): na How long has patient had these symptoms:  naList of preferred pharmacies on file (remove unneeded): [unfilled]If different, enter pharmacy into here including location and phone number: naPreferred Method of Contact: Phone CallPatient's Preferred Phone Number on File: [phone] Best Call Back Number, if different:Additional Information: pt calling regarding getting a shot for arthritis

## 2025-03-10 ENCOUNTER — TELEPHONE (OUTPATIENT)
Dept: FAMILY MEDICINE | Facility: CLINIC | Age: 75
End: 2025-03-10
Payer: MEDICARE

## 2025-03-10 DIAGNOSIS — N18.32 TYPE 2 DIABETES MELLITUS WITH STAGE 3B CHRONIC KIDNEY DISEASE, WITHOUT LONG-TERM CURRENT USE OF INSULIN: ICD-10-CM

## 2025-03-10 DIAGNOSIS — E11.22 TYPE 2 DIABETES MELLITUS WITH STAGE 3B CHRONIC KIDNEY DISEASE, WITHOUT LONG-TERM CURRENT USE OF INSULIN: ICD-10-CM

## 2025-03-10 RX ORDER — SEMAGLUTIDE 0.68 MG/ML
0.25 INJECTION, SOLUTION SUBCUTANEOUS
Qty: 4.5 ML | Refills: 0 | Status: SHIPPED | OUTPATIENT
Start: 2025-03-10 | End: 2025-06-08

## 2025-03-10 NOTE — TELEPHONE ENCOUNTER
Pt is requesting a new pair of diabetic shoes. She states her old PCP would contact Dr. Sanchez in [place]. I am not sure how the order works for them if we send an order?     She is asking about urinary urgency and leaking medication and if this is something we could give her. Has been having urinary leakage that is bothering her.     Also she needs a RF of Ozempic soon per pharm request [facility]. Last filled 02/20/2025.

## 2025-03-11 ENCOUNTER — OFFICE VISIT (OUTPATIENT)
Dept: FAMILY MEDICINE | Facility: CLINIC | Age: 75
End: 2025-03-11
Payer: MEDICARE

## 2025-03-11 VITALS
TEMPERATURE: 98 F | WEIGHT: 259.19 LBS | BODY MASS INDEX: 45.92 KG/M2 | HEART RATE: 99 BPM | DIASTOLIC BLOOD PRESSURE: 72 MMHG | SYSTOLIC BLOOD PRESSURE: 128 MMHG | RESPIRATION RATE: 15 BRPM | OXYGEN SATURATION: 99 %

## 2025-03-11 DIAGNOSIS — M17.0 PRIMARY OSTEOARTHRITIS OF BOTH KNEES: ICD-10-CM

## 2025-03-11 DIAGNOSIS — E78.2 MIXED HYPERLIPIDEMIA: ICD-10-CM

## 2025-03-11 DIAGNOSIS — N39.41 URGE INCONTINENCE OF URINE: Primary | ICD-10-CM

## 2025-03-11 LAB
BILIRUB SERPL-MCNC: ABNORMAL MG/DL
BLOOD URINE, POC: NEGATIVE
CLARITY, POC UA: ABNORMAL
COLOR, POC UA: ABNORMAL
GLUCOSE UR QL STRIP: ABNORMAL
KETONES UR QL STRIP: NEGATIVE
LEUKOCYTE ESTERASE URINE, POC: NEGATIVE
NITRITE, POC UA: NEGATIVE
PH, POC UA: 6
PROTEIN, POC: NEGATIVE
SPECIFIC GRAVITY, POC UA: 1.01
UROBILINOGEN, POC UA: 0.2

## 2025-03-11 PROCEDURE — 1125F AMNT PAIN NOTED PAIN PRSNT: CPT | Mod: CPTII,,,

## 2025-03-11 PROCEDURE — 1101F PT FALLS ASSESS-DOCD LE1/YR: CPT | Mod: CPTII,,,

## 2025-03-11 PROCEDURE — 20610 DRAIN/INJ JOINT/BURSA W/O US: CPT | Mod: RT,,,

## 2025-03-11 PROCEDURE — 3074F SYST BP LT 130 MM HG: CPT | Mod: CPTII,,,

## 2025-03-11 PROCEDURE — 3288F FALL RISK ASSESSMENT DOCD: CPT | Mod: CPTII,,,

## 2025-03-11 PROCEDURE — 99213 OFFICE O/P EST LOW 20 MIN: CPT | Mod: 25,,,

## 2025-03-11 PROCEDURE — 1159F MED LIST DOCD IN RCRD: CPT | Mod: CPTII,,,

## 2025-03-11 PROCEDURE — 81002 URINALYSIS NONAUTO W/O SCOPE: CPT | Mod: ,,,

## 2025-03-11 PROCEDURE — 3078F DIAST BP <80 MM HG: CPT | Mod: CPTII,,,

## 2025-03-11 PROCEDURE — 3046F HEMOGLOBIN A1C LEVEL >9.0%: CPT | Mod: CPTII,,,

## 2025-03-11 RX ORDER — TRIAMCINOLONE ACETONIDE 40 MG/ML
40 INJECTION, SUSPENSION INTRA-ARTICULAR; INTRAMUSCULAR
Status: COMPLETED | OUTPATIENT
Start: 2025-03-11 | End: 2025-03-11

## 2025-03-11 RX ORDER — LIDOCAINE HYDROCHLORIDE 10 MG/ML
1 INJECTION, SOLUTION INFILTRATION; PERINEURAL
Status: COMPLETED | OUTPATIENT
Start: 2025-03-11 | End: 2025-03-11

## 2025-03-11 RX ORDER — EZETIMIBE 10 MG/1
10 TABLET ORAL DAILY
Qty: 90 TABLET | Refills: 3 | Status: SHIPPED | OUTPATIENT
Start: 2025-03-11 | End: 2026-03-11

## 2025-03-11 RX ORDER — OXYBUTYNIN CHLORIDE 5 MG/1
5 TABLET, EXTENDED RELEASE ORAL DAILY
Qty: 30 TABLET | Refills: 11 | Status: SHIPPED | OUTPATIENT
Start: 2025-03-11 | End: 2026-03-11

## 2025-03-11 RX ADMIN — LIDOCAINE HYDROCHLORIDE 1 ML: 10 INJECTION, SOLUTION INFILTRATION; PERINEURAL at 02:03

## 2025-03-11 RX ADMIN — TRIAMCINOLONE ACETONIDE 40 MG: 40 INJECTION, SUSPENSION INTRA-ARTICULAR; INTRAMUSCULAR at 02:03

## 2025-03-11 NOTE — ASSESSMENT & PLAN NOTE
Patient with symptoms of overactive bladder.  We will try oxybutynin.  She has been aware of the side effects.

## 2025-03-11 NOTE — PROCEDURES
Large Joint Aspiration/Injection: R knee    Date/Time: 3/11/2025 2:00 PM    Performed by: Hanna Gupta MD  Authorized by: Hanna Gupta MD    Consent Done?:  Yes (Written)  Indications:  Arthritis  Local anesthesia used?: No      Details:  Needle Size:  25 G  Ultrasonic Guidance for needle placement?: No    Approach:  Anterolateral  Location:  Knee  Site:  R knee  Medications comment:  Lidocaine 1% 4mL and Kenalog 40 mg  Patient tolerance:  Patient tolerated the procedure well with no immediate complications

## 2025-03-11 NOTE — ASSESSMENT & PLAN NOTE
Bilateral knee osteoarthritis on x-rays in September 2024.  Voltaren gel did not help her pain.  Patient can not take NSAIDs due to chronic kidney disease.  Steroid injection of the right knee was done today.  We will have her come back next week for left knee.

## 2025-03-11 NOTE — PROGRESS NOTES
[facility]  Hanna Gupta MD    Patient ID: [id number]     Chief Complaint: Urinary Frequency (And leakage)      HPI:     Fabiola Mejia is a 74 y.o. female here today for urinary leakage.    She reports sudden urgency to use the bathroom, and sometimes can not make it to the bathroom before urine starts to leak out.  She denied leakage with coughing or sneezing.    Patient also reports bilateral knee pain, right worse than left today.  She is interested in a steroid injection.  She had 1 about 2 years ago that was effective.  Bilateral knee x-rays in September of last year showed degenerative changes.      Past Medical History:   Diagnosis Date    Connective tissue disorder     Degenerative arthritis of knee, bilateral     Disorder of rotator cuff 02/03/2025    Effusion of knee 02/03/2025    Fibromyalgia     Osteopenia     Strain of flexor muscle of hip 02/03/2025        Past Surgical History:   Procedure Laterality Date    TONSILLECTOMY          Social History     Tobacco Use    Smoking status: Never    Smokeless tobacco: Never   Substance and Sexual Activity    Alcohol use: Never    Drug use: Never    Sexual activity: Not Currently        Current Outpatient Medications   Medication Instructions    blood pressure monitor Kit 1 each, Misc.(Non-Drug; Combo Route), Daily    blood sugar diagnostic Strp To check BG 1 times daily, to use with insurance preferred meter    blood sugar diagnostic Strp To check BG four times daily, to use with insurance preferred meter    blood-glucose meter kit To check BG 1 times daily, to use with insurance preferred meter    blood-glucose meter kit To check BG four times daily, to use with insurance preferred meter    clobetasoL (TEMOVATE) 0.05 % cream     dapagliflozin propanediol (FARXIGA) 5 mg, Oral, Daily    desonide (DESOWEN) 0.05 % cream     ezetimibe (ZETIA) 10 mg, Oral, Daily    glipiZIDE (GLUCOTROL) 5 MG tablet Take 1 tab po daily.    hydroCHLOROthiazide 12.5 mg,  "Oral, Daily    lancets Misc To check BG 1 times daily, to use with insurance preferred meter    lancets Misc To check BG four times daily, to use with insurance preferred meter    multivitamin (THERAGRAN) per tablet 1 tablet, Daily    oxybutynin (DITROPAN-XL) 5 mg, Oral, Daily    OZEMPIC 0.25 mg, Subcutaneous, Every 7 days    pantoprazole (PROTONIX) 40 mg, Daily    pioglitazone (ACTOS) 15 MG tablet Take 1 tab po daily.    pravastatin (PRAVACHOL) 40 MG tablet Take 1 tab po Q hs.    UNABLE TO FIND 1 capsule, Daily    UNABLE TO FIND 850 mg, Daily    VITAMIN A ORAL 2,400 mcg, Daily    vitamin E 400 Units, Daily       Review of patient's allergies indicates:   Allergen Reactions    Codeine Other (See Comments)    Gabapentin     Hydroxychloroquine      Other Reaction(s): Loose bowel movement    Methocarbamol      Other Reaction(s): makes her sleepy and feel "funny"    Acetaminophen-codeine Other (See Comments)    Tramadol Rash        Patient Care Team:  Hanna Gupta MD as PCP - General (Family Medicine)  Shikha Ramirez FNP as Nurse Practitioner (Nephrology)  Mariana Whelan MD (Rheumatology)     Subjective:     Review of Systems    12 point review of systems conducted, negative except as stated in the history of present illness. See HPI for details.    Objective:     Visit Vitals  /72   Pulse 99   Temp 97.5 °F (36.4 °C) (Oral)   Resp 15   Wt 117.6 kg (259 lb 3.2 oz)   SpO2 99%   BMI 45.92 kg/m²       Physical Exam  Vitals and nursing note reviewed.   Constitutional:       General: She is not in acute distress.     Appearance: She is not ill-appearing.   HENT:      Head: Normocephalic and atraumatic.   Eyes:      General: No scleral icterus.     Extraocular Movements: Extraocular movements intact.      Conjunctiva/sclera: Conjunctivae normal.   Cardiovascular:      Rate and Rhythm: Normal rate.   Pulmonary:      Effort: Pulmonary effort is normal.   Musculoskeletal:         General: Normal range of motion.      " Right lower leg: No edema.      Left lower leg: No edema.   Skin:     General: Skin is warm and dry.   Neurological:      General: No focal deficit present.      Mental Status: She is alert.   Psychiatric:         Mood and Affect: Mood normal.         Labs Reviewed:     Chemistry:  Lab Results   Component Value Date     02/03/2025    K 4.9 02/03/2025    BUN 25.6 (H) 02/03/2025    CREATININE 1.48 (H) 02/03/2025    EGFRNORACEVR 37 02/03/2025    GLUCOSE 251 (H) 02/03/2025    CALCIUM 9.8 02/03/2025    ALKPHOS 81 02/03/2025    LABPROT 7.2 02/03/2025    ALBUMIN 3.5 02/03/2025    BILIDIR 0.3 02/21/2023    IBILI 0.40 02/21/2023    AST 14 02/03/2025    ALT 19 02/03/2025    HBCOXTPR16ZI 54.5 02/29/2024    TSH 2.420 11/08/2024    LVRYHD8QOSB 0.78 02/29/2024        Lab Results   Component Value Date    HGBA1C 10.0 (H) 02/03/2025        Hematology:  Lab Results   Component Value Date    WBC 7.07 02/03/2025    HGB 13.8 02/03/2025    HCT 44.0 02/03/2025     02/03/2025       Lipid Panel:  Lab Results   Component Value Date    CHOL 299 (H) 02/03/2025    HDL 52 02/03/2025    .00 (H) 02/03/2025    TRIG 298 (H) 02/03/2025    TOTALCHOLEST 6 (H) 02/03/2025        Urine:  Lab Results   Component Value Date    APPEARANCEUA Clear 10/18/2024    SGUA 1.015 10/18/2024    PROTEINUA Negative 10/18/2024    KETONESUA Negative 10/18/2024    LEUKOCYTESUR Negative 10/18/2024    RBCUA None Seen 10/18/2024    WBCUA None Seen 10/18/2024    BACTERIA Rare 10/18/2024    SQEPUA Occ (A) 04/22/2024    HYALINECASTS None Seen 04/22/2024    CREATRANDUR 52.4 10/18/2024    PROTEINURINE <6.8 10/18/2024        Assessment:       ICD-10-CM ICD-9-CM   1. Urge incontinence of urine  N39.41 788.31   2. Mixed hyperlipidemia  E78.2 272.2   3. Primary osteoarthritis of both knees  M17.0 715.16        Plan:     1. Urge incontinence of urine  Assessment & Plan:  Patient with symptoms of overactive bladder.  We will try oxybutynin.  She has been aware of  the side effects.    Orders:  -     POCT URINE DIPSTICK WITHOUT MICROSCOPE  -     oxybutynin (DITROPAN-XL) 5 MG TR24; Take 1 tablet (5 mg total) by mouth once daily.  Dispense: 30 tablet; Refill: 11    2. Mixed hyperlipidemia  Overview:  Pravastatin 40 mg daily    Assessment & Plan:  Patient is far above her LDL goal of <70.  Zetia added today.  Continue pravastatin    Lab Results   Component Value Date    .00 (H) 02/03/2025    TRIG 298 (H) 02/03/2025    HDL 52 02/03/2025    TOTALCHOLEST 6 (H) 02/03/2025     Educated on importance of dietary modifications. Follow a low cholesterol, low saturated fat diet with less that 200mg of cholesterol a day.  Avoid fried foods and high saturated fats (high saturated fats less than 7% of calories).  Increase dietary fiber.  Regular exercise can reduce LDL and raise HDL. Stressed importance of physical activity 5 times per week for 30 minutes per day.       Orders:  -     ezetimibe (ZETIA) 10 mg tablet; Take 1 tablet (10 mg total) by mouth once daily.  Dispense: 90 tablet; Refill: 3    3. Primary osteoarthritis of both knees  Assessment & Plan:  Bilateral knee osteoarthritis on x-rays in September 2024.  Voltaren gel did not help her pain.  Patient can not take NSAIDs due to chronic kidney disease.  Steroid injection of the right knee was done today.  We will have her come back next week for left knee.    Orders:  -     triamcinolone acetonide injection 40 mg  -     LIDOcaine HCL 10 mg/ml (1%) injection 1 mL         No follow-ups on file. In addition to their scheduled follow up, the patient has also been instructed to follow up on as needed basis.     Future Appointments   Date Time Provider Department Center   3/18/2025 10:40 AM Hanna Gupta MD [facility]    5/5/2025  1:00 PM Hanna Gupta MD [facility] ANIL  Oskar    5/21/2025 10:30 AM Shikha Ramirez FNP [facility] EVONNE [facility]    6/16/2025 10:00 AM Mariana Whelan MD [facility] MAMIE Ferreira  MD Alonso

## 2025-03-11 NOTE — ASSESSMENT & PLAN NOTE
Patient is far above her LDL goal of <70.  Zetia added today.  Continue pravastatin    Lab Results   Component Value Date    .00 (H) 02/03/2025    TRIG 298 (H) 02/03/2025    HDL 52 02/03/2025    TOTALCHOLEST 6 (H) 02/03/2025     Educated on importance of dietary modifications. Follow a low cholesterol, low saturated fat diet with less that 200mg of cholesterol a day.  Avoid fried foods and high saturated fats (high saturated fats less than 7% of calories).  Increase dietary fiber.  Regular exercise can reduce LDL and raise HDL. Stressed importance of physical activity 5 times per week for 30 minutes per day.

## 2025-03-18 ENCOUNTER — OFFICE VISIT (OUTPATIENT)
Dept: FAMILY MEDICINE | Facility: CLINIC | Age: 75
End: 2025-03-18
Payer: MEDICARE

## 2025-03-18 VITALS
OXYGEN SATURATION: 98 % | HEART RATE: 82 BPM | BODY MASS INDEX: 45.7 KG/M2 | RESPIRATION RATE: 15 BRPM | SYSTOLIC BLOOD PRESSURE: 145 MMHG | TEMPERATURE: 98 F | DIASTOLIC BLOOD PRESSURE: 78 MMHG | WEIGHT: 258 LBS

## 2025-03-18 DIAGNOSIS — R23.4 ESCHAR: ICD-10-CM

## 2025-03-18 DIAGNOSIS — M17.0 PRIMARY OSTEOARTHRITIS OF BOTH KNEES: Primary | ICD-10-CM

## 2025-03-18 PROCEDURE — 20610 DRAIN/INJ JOINT/BURSA W/O US: CPT | Mod: LT,,,

## 2025-03-18 PROCEDURE — 1125F AMNT PAIN NOTED PAIN PRSNT: CPT | Mod: CPTII,,,

## 2025-03-18 PROCEDURE — 3046F HEMOGLOBIN A1C LEVEL >9.0%: CPT | Mod: CPTII,,,

## 2025-03-18 PROCEDURE — 99213 OFFICE O/P EST LOW 20 MIN: CPT | Mod: 25,,,

## 2025-03-18 PROCEDURE — 3288F FALL RISK ASSESSMENT DOCD: CPT | Mod: CPTII,,,

## 2025-03-18 PROCEDURE — 3077F SYST BP >= 140 MM HG: CPT | Mod: CPTII,,,

## 2025-03-18 PROCEDURE — 1101F PT FALLS ASSESS-DOCD LE1/YR: CPT | Mod: CPTII,,,

## 2025-03-18 PROCEDURE — 1159F MED LIST DOCD IN RCRD: CPT | Mod: CPTII,,,

## 2025-03-18 PROCEDURE — 3078F DIAST BP <80 MM HG: CPT | Mod: CPTII,,,

## 2025-03-18 RX ORDER — MUPIROCIN 20 MG/G
OINTMENT TOPICAL 2 TIMES DAILY
Qty: 15 G | Refills: 2 | Status: SHIPPED | OUTPATIENT
Start: 2025-03-18

## 2025-03-18 RX ORDER — BERBERINE CHLOR/SEAWEED/CHROM 500-250 MG
2 CAPSULE ORAL DAILY
Qty: 180 CAPSULE | Refills: 2 | Status: SHIPPED | OUTPATIENT
Start: 2025-03-18

## 2025-03-18 RX ORDER — TRIAMCINOLONE ACETONIDE 40 MG/ML
40 INJECTION, SUSPENSION INTRA-ARTICULAR; INTRAMUSCULAR
Status: COMPLETED | OUTPATIENT
Start: 2025-03-18 | End: 2025-03-18

## 2025-03-18 RX ORDER — LIDOCAINE HYDROCHLORIDE 10 MG/ML
4 INJECTION, SOLUTION INFILTRATION; PERINEURAL
Status: COMPLETED | OUTPATIENT
Start: 2025-03-18 | End: 2025-03-18

## 2025-03-18 RX ADMIN — TRIAMCINOLONE ACETONIDE 40 MG: 40 INJECTION, SUSPENSION INTRA-ARTICULAR; INTRAMUSCULAR at 10:03

## 2025-03-18 RX ADMIN — LIDOCAINE HYDROCHLORIDE 4 ML: 10 INJECTION, SOLUTION INFILTRATION; PERINEURAL at 10:03

## 2025-03-18 NOTE — ASSESSMENT & PLAN NOTE
Bilateral knee osteoarthritis on x-rays in September 2024.  Voltaren gel did not help her pain.  Patient can not take NSAIDs due to chronic kidney disease.  Steroid injection of the right knee was done last week.  Steroid injection of the left knee today.  See procedure note above.

## 2025-03-18 NOTE — PROGRESS NOTES
[facility]  Hanna Gupta MD    Patient ID: [id number]     Chief Complaint: Knee injection      HPI:     Fabiola Mejia is a 74 y.o. female here today for left knee pain.    X-ray of the left knee on 09/26/2024 showed narrowing of the medial compartment of the knee joint with sclerosis and subchondral cyst formation and tiny marginal osteophytes in the lateral compartment and patellofemoral joint space.    Patient is interested in a steroid injection of the left knee today.    Reports an eschar to her right thigh.  Three months ago she fell asleep with a heating pack on her leg and woke up with a blister.  The blister did eventually burst and left an ulcer.  She has not been applying anything to it.  It is painful to touch.    Past Medical History:   Diagnosis Date    Connective tissue disorder     Degenerative arthritis of knee, bilateral     Disorder of rotator cuff 02/03/2025    Effusion of knee 02/03/2025    Fibromyalgia     Osteopenia     Strain of flexor muscle of hip 02/03/2025        Past Surgical History:   Procedure Laterality Date    TONSILLECTOMY          Social History     Tobacco Use    Smoking status: Never    Smokeless tobacco: Never   Substance and Sexual Activity    Alcohol use: Never    Drug use: Never    Sexual activity: Not Currently        Current Outpatient Medications   Medication Instructions    berberine chloride 500 mg Cap 2 capsules, Oral, Daily    blood pressure monitor Kit 1 each, Misc.(Non-Drug; Combo Route), Daily    blood sugar diagnostic Strp To check BG 1 times daily, to use with insurance preferred meter    blood sugar diagnostic Strp To check BG four times daily, to use with insurance preferred meter    blood-glucose meter kit To check BG 1 times daily, to use with insurance preferred meter    blood-glucose meter kit To check BG four times daily, to use with insurance preferred meter    clobetasoL (TEMOVATE) 0.05 % cream     dapagliflozin propanediol (FARXIGA) 5  "mg, Oral, Daily    desonide (DESOWEN) 0.05 % cream     ezetimibe (ZETIA) 10 mg, Oral, Daily    glipiZIDE (GLUCOTROL) 5 MG tablet Take 1 tab po daily.    hydroCHLOROthiazide 12.5 mg, Oral, Daily    lancets Misc To check BG 1 times daily, to use with insurance preferred meter    lancets Misc To check BG four times daily, to use with insurance preferred meter    multivitamin (THERAGRAN) per tablet 1 tablet, Daily    mupirocin (BACTROBAN) 2 % ointment Topical (Top), 2 times daily, Apply to wound on left thigh    oxybutynin (DITROPAN-XL) 5 mg, Oral, Daily    OZEMPIC 0.25 mg, Subcutaneous, Every 7 days    pantoprazole (PROTONIX) 40 mg, Daily    pioglitazone (ACTOS) 15 MG tablet Take 1 tab po daily.    pravastatin (PRAVACHOL) 40 MG tablet Take 1 tab po Q hs.    UNABLE TO FIND 1 capsule, Daily    UNABLE TO FIND 850 mg, Daily    VITAMIN A ORAL 2,400 mcg, Daily    vitamin E 400 Units, Daily       Review of patient's allergies indicates:   Allergen Reactions    Codeine Other (See Comments)    Gabapentin     Hydroxychloroquine      Other Reaction(s): Loose bowel movement    Methocarbamol      Other Reaction(s): makes her sleepy and feel "funny"    Acetaminophen-codeine Other (See Comments)    Tramadol Rash        Patient Care Team:  Hanna Gupta MD as PCP - General (Family Medicine)  Shikha Ramirez FNP as Nurse Practitioner (Nephrology)  Mariana Whelan MD (Rheumatology)     Subjective:     Review of Systems    12 point review of systems conducted, negative except as stated in the history of present illness. See HPI for details.    Objective:     Visit Vitals  BP (!) 145/78   Pulse 82   Temp 97.8 °F (36.6 °C) (Oral)   Resp 15   Wt 117 kg (258 lb)   SpO2 98%   BMI 45.70 kg/m²       Physical Exam  Vitals and nursing note reviewed.   Constitutional:       General: She is not in acute distress.     Appearance: She is not ill-appearing.   HENT:      Head: Normocephalic and atraumatic.   Eyes:      General: No scleral icterus.     " Extraocular Movements: Extraocular movements intact.      Conjunctiva/sclera: Conjunctivae normal.   Cardiovascular:      Rate and Rhythm: Normal rate.   Pulmonary:      Effort: Pulmonary effort is normal.   Musculoskeletal:         General: Normal range of motion.      Right lower leg: No edema.      Left lower leg: No edema.   Skin:     General: Skin is warm and dry.      Comments: 2x1cm eschar to right thigh.  Tender to palpation.  Minimal surrounding erythema.  No warmth or drainage.   Neurological:      General: No focal deficit present.      Mental Status: She is alert.   Psychiatric:         Mood and Affect: Mood normal.         Labs Reviewed:     Chemistry:  Lab Results   Component Value Date     02/03/2025    K 4.9 02/03/2025    BUN 25.6 (H) 02/03/2025    CREATININE 1.48 (H) 02/03/2025    EGFRNORACEVR 37 02/03/2025    GLUCOSE 251 (H) 02/03/2025    CALCIUM 9.8 02/03/2025    ALKPHOS 81 02/03/2025    LABPROT 7.2 02/03/2025    ALBUMIN 3.5 02/03/2025    BILIDIR 0.3 02/21/2023    IBILI 0.40 02/21/2023    AST 14 02/03/2025    ALT 19 02/03/2025    OYVENJXI43SH 54.5 02/29/2024    TSH 2.420 11/08/2024    AYIPGC7TNKH 0.78 02/29/2024        Lab Results   Component Value Date    HGBA1C 10.0 (H) 02/03/2025        Hematology:  Lab Results   Component Value Date    WBC 7.07 02/03/2025    HGB 13.8 02/03/2025    HCT 44.0 02/03/2025     02/03/2025       Lipid Panel:  Lab Results   Component Value Date    CHOL 299 (H) 02/03/2025    HDL 52 02/03/2025    .00 (H) 02/03/2025    TRIG 298 (H) 02/03/2025    TOTALCHOLEST 6 (H) 02/03/2025        Urine:  Lab Results   Component Value Date    APPEARANCEUA Clear 10/18/2024    SGUA 1.015 10/18/2024    PROTEINUA Negative 10/18/2024    KETONESUA Negative 10/18/2024    LEUKOCYTESUR Negative 10/18/2024    RBCUA None Seen 10/18/2024    WBCUA None Seen 10/18/2024    BACTERIA Rare 10/18/2024    SQEPUA Occ (A) 04/22/2024    HYALINECASTS None Seen 04/22/2024    CREATRANDUR 52.4  10/18/2024    PROTEINURINE <6.8 10/18/2024        Left Knee Steroid Injection  Indication:  Osteoarthritis  Patient provided written consent for the procedure following discussion of risks and benefits.  The left knee was palpated and the site of injection of the anterior lateral aspect of the knee was marked.  The skin was cleaned with ChloraPrep.  Using a 1-1/2 inch 25 gauge needle on a 5 cc syringe, 4 cc of 1% lidocaine and 40 mg Kenalog was injected into the knee.  The patient tolerated the procedure well.      Assessment:       ICD-10-CM ICD-9-CM   1. Primary osteoarthritis of both knees  M17.0 715.16   2. Eschar  R23.4 782.8        Plan:     1. Primary osteoarthritis of both knees  Assessment & Plan:  Bilateral knee osteoarthritis on x-rays in September 2024.  Voltaren gel did not help her pain.  Patient can not take NSAIDs due to chronic kidney disease.  Steroid injection of the right knee was done last week.  Steroid injection of the left knee today.  See procedure note above.    Orders:  -     triamcinolone acetonide injection 40 mg  -     LIDOcaine HCL 10 mg/ml (1%) injection 4 mL    2. Eschar  Assessment & Plan:  Patient has 1x2cm eschar to her right thigh from a heating pad burn which formed a blister then an ulcer about 3 months ago.  Just tender to palpation.  We will send to wound care for treatment.  Apply mupirocin.    Orders:  -     Ambulatory referral/consult to Wound Clinic; Future; Expected date: 03/25/2025  -     mupirocin (BACTROBAN) 2 % ointment; Apply topically 2 (two) times daily. Apply to wound on left thigh  Dispense: 15 g; Refill: 2    Other orders  -     berberine chloride 500 mg Cap; Take 2 capsules by mouth once daily.  Dispense: 180 capsule; Refill: 2         No follow-ups on file. In addition to their scheduled follow up, the patient has also been instructed to follow up on as needed basis.     Future Appointments   Date Time Provider Department Center   5/5/2025  1:00 PM Alonso  MD [facility]   5/21/2025 10:30 AM Shikha Ramirez FNP [facility]   6/16/2025 10:00 AM Mariana Whelan MD [facility]        Hanna Gupta MD
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
## 2025-03-19 ENCOUNTER — TELEPHONE (OUTPATIENT)
Dept: FAMILY MEDICINE | Facility: CLINIC | Age: 75
End: 2025-03-19
Payer: MEDICARE

## 2025-03-19 NOTE — TELEPHONE ENCOUNTER
----- Message from Tressa sent at 3/19/2025 11:35 AM CDT -----  Contact: `  Who Called: Fabiola Live is requesting assistance/information from provider's office.Symptoms (please be specific):  How long has patient had these symptoms:  List of preferred pharmacies on file (remove unneeded): [unfilled]If different, enter pharmacy into here including location and phone number: Preferred Method of Contact: Phone CallPatient's Preferred Phone Number on File: [phone] Best Call Back Number, if different:Additional Information: Pt called to speak with nurse about bladder leak medication she was suppose to get by mail order and still haven't received anything

## 2025-03-26 ENCOUNTER — HOSPITAL ENCOUNTER (OUTPATIENT)
Dept: WOUND CARE | Facility: HOSPITAL | Age: 75
Discharge: HOME | End: 2025-03-26
Attending: PEDIATRICS
Payer: MEDICARE

## 2025-03-26 DIAGNOSIS — R23.4 ESCHAR: ICD-10-CM

## 2025-03-26 DIAGNOSIS — T30.0 THERMAL BURN: Primary | ICD-10-CM

## 2025-03-26 DIAGNOSIS — N18.32 CKD STAGE G3B/A1, GFR 30-44 AND ALBUMIN CREATININE RATIO <30 MG/G: Primary | ICD-10-CM

## 2025-03-26 PROCEDURE — 99203 OFFICE O/P NEW LOW 30 MIN: CPT | Mod: 25,,, | Performed by: PEDIATRICS

## 2025-03-26 PROCEDURE — 27000999 HC MEDICAL RECORD PHOTO DOCUMENTATION

## 2025-03-26 PROCEDURE — 11042 DBRDMT SUBQ TIS 1ST 20SQCM/<: CPT

## 2025-03-26 PROCEDURE — 99213 OFFICE O/P EST LOW 20 MIN: CPT

## 2025-03-26 PROCEDURE — 11042 DBRDMT SUBQ TIS 1ST 20SQCM/<: CPT | Mod: ,,, | Performed by: PEDIATRICS

## 2025-03-26 RX ORDER — LIDOCAINE 40 MG/G
CREAM TOPICAL
Status: DISPENSED
Start: 2025-03-26 | End: 2025-03-26

## 2025-03-26 RX ORDER — DEXTROMETHORPHAN HYDROBROMIDE, GUAIFENESIN 5; 100 MG/5ML; MG/5ML
650 LIQUID ORAL EVERY 8 HOURS
COMMUNITY

## 2025-03-26 NOTE — PATIENT INSTRUCTIONS
Cleanse with Vashe  Pat dry  Apply Mepilex AG foam, secure with tape or bandaid  Change every 2 days    May shower with protection over dressings on day's not changing wound dressing, may remove for showering and re-apply dressing after on days dressing changes area due.    Return for md visit in 1 week.     Discontinue use of  Mupiriocin ointment to wound  Do not leave open to air
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
## 2025-03-27 ENCOUNTER — PATIENT OUTREACH (OUTPATIENT)
Facility: CLINIC | Age: 75
End: 2025-03-27
Payer: MEDICARE

## 2025-03-27 NOTE — PROGRESS NOTES
Value based outreach done - no sdoh needs ,No med needs . Pt has f/u 5/5/2025 with Primary Care . Pt has vitist with [facility] 4/2/2025. BRIAN Dr Aries Mcpherson Re:  Diabetic Eye Exam (6/3/2024. Chart reviewed/Updated

## 2025-03-27 NOTE — LETTER
AUTHORIZATION FOR RELEASE OF   CONFIDENTIAL INFORMATION        We are seeing Fabiola Mejia, date of birth 1950, in the clinic at [facility]. Hanna Gupta MD is the patient's PCP. Fabiola Mejia has an outstanding lab/procedure at the time we reviewed her chart. In order to help keep her health information updated, she has authorized us to request the following medical record(s):                                               ( x )  EYE EXAM                  Please fax records to Hanna Gupta MD,  at [phone] or email to [email or web address].         Patient Name: Fabiola Mejia  : 1950  Patient Phone #: [phone]                Fabiola Mejia  MRN: [id number]  : 1950  Age: 74 y.o.  Sex: female         Patient/Legal Guardian Signature  This signature was collected at 10/18/2024    Fabiola Mejia       _______________________________   Printed Name/Relationship to Patient      Consent for Examination and Treatment: I hereby authorize the providers and employees of [facility] ([facility]) to provide medical treatment/services which includes, but is not limited to, performing and administering tests and diagnostic procedures that are deemed necessary, including, but not limited to, imaging examinations, blood tests and other laboratory procedures as may be required by the hospital, clinic, or may be ordered by my physician(s) or persons working under the general and/or special instructions of my physician(s).      I understand and agree that this consent covers all authorized persons, including but not limited to physicians, residents, nurse practitioners, physicians' assistants, specialists, consultants, student nurses, and independently contracted physicians, who are called upon by the physician in charge, to carry out the diagnostic procedures and medical or surgical treatment.     I hereby authorize [facility] to retain or dispose of any specimens or tissue, should there be  such remaining from any test or procedure.     I hereby authorize and give consent for [facility] providers and employees to take photographs, images or videotapes of such diagnostic, surgical or treatment procedures of Patient as may be required by [facility] or as may be ordered by a physician. I further acknowledge and agree that [facility] may use cameras or other devices for patient monitoring.     I am aware that the practice of medicine is not an exact science, and I acknowledge that no guarantees have been made to me as to the outcome of any tests, procedures or treatment.     Authorization for Release of Information: I understand that my insurance company and/or their agents may need information necessary to make determinations about payment/reimbursement. I hereby provide authorization to release to all insurance companies, their successors, assignees, other parties with whom they may have contracted, or others acting on their behalf, that are involved with payment for any hospital and/or clinic charges incurred by the patient, any information that they request and deem necessary for payment/reimbursement, and/or quality review.  I further authorize the release of my health information to physicians or other health care practitioners on staff who are involved in my health care now and in the future, and to other health care providers, entities, or institutions for the purpose of my continued care and treatment, including referrals.     REGISTRATION AUTHORIZATION  Form No. 24652 (Rev. 3/25/2024)    Page 1 of 3                       Medicare Patient's Certification and Authorization to Release Information and Payment Request:  I certify that the information given by me in applying for payment under Title XVIII of the Social Security Act is correct. I authorize any taylor of medical or other information about me to release to the Social SecurityAdministration, or its intermediaries or carriers, any information needed  for this or a related Medicare claim. I request that payment of authorized benefits be made on my behalf.     Assignment of Insurance Benefits:   I hereby authorize any and all insurance companies, health plans, defined   benefit plans, health insurers or any entity that is or may be responsible for payment of my medical expenses to pay all hospital and medical benefits now due, and to become due and payable to me under any hospital benefits, sick benefits, injury benefits or any other benefit for services rendered to me, including Major Medical Benefits, direct to [facility] and all independently contracted physicians. I assign any and all rights that I may have against any and all insurance companies, health plans, defined benefit plans, health insurers or any entity that is or may be responsible for payment of my medical expenses, including, but not limited to any right to appeal a denial of a claim, any right to bring any action, lawsuit, administrative proceeding, or other cause of action on my behalf. I specifically assign my right to pursue litigation against any and all insurance companies, health plans, defined benefit plans, health insurers or any entity that is or may be responsible for payment of my medical expenses based upon a refusal to pay charges.            E. Valuables: It is understood and agreed that [facility] is not liable for the damage to or loss of any money, jewelry,   documents, dentures, eye glasses, hearing aids, prosthetics, or other property of value.     F. Computer Equipment: I understand and agree that should I choose to use computer equipment owned by [facility] or if I choose to access the Internet via [facility]s network, I do so at my own risk. [facility] is not responsible for any damage to my computer equipment or to any damages of any type that might arise from my loss of equipment or data.     G. Acceptance of Financial Responsibility:  I agree that in consideration of the services and    supplies that have been   or will be furnished to the patient, I am hereby obligated to pay all charges made for or on the account of the patient according to the standard rates (in effect at the time the services and supplies are delivered) established by [facility], including its Patient Financial Assistance Policy to the extent it is applicable. I understand that I am responsible for all charges, or portions thereof, not covered by insurance or other sources. Patient refunds will be distributed only after balances at all [facility] facilities are paid.     H. Communication Authorization:  I hereby authorize [facility] and its representatives, along with any billing service   or  who may work on their behalf, to contact me on   my cell phone and/or home phone using pre- recorded messages, artificial voice messages, automatic telephone dialing devices or other computer assisted technology, or by electronic      mail, text messaging, or by any other form of electronic communication. This includes, but is not limited to, appointment reminders, yearly physical exam reminders, preventive care reminders, patient campaigns, welcome calls, and calls about account balances on my account or any account on which I am listed as a guarantor. I understand I have the right to opt out of these communications at any time.      Relationship  Between  Facility and  Provider:      I understand that some, but not all, providers furnishing services to the patient are not employees or agents of [facility]. The patient is under the care and supervision of his/her attending physician, and it is the responsibility of the facility and its nursing staff to carry out the instructions of such physicians. It is the responsibility of the patient's physician/designee to obtain the patient's informed consent, when required, for medical or surgical treatment, special diagnostic or therapeutic procedures, or hospital services rendered for the  patient under the special instructions of the physician/designee.           REGISTRATION AUTHORIZATION  Form No. 10247 (Rev. 3/25/2024)    Page 2 of 3                       Immunizations: [facility] shares immunization information with state sponsored health departments to help you and your doctor keep track of your immunization records. By signing, you consent to have this information shared with the health department in your state:                                [place] - LINKS ([place] Immunization Network for Kids Statewide)                                [place] - MIIX ([place] Immunization Information eXchange)                                [place] - ImmPRINT (Immunization Patient Registry with Integrated Technology)     TERM: This authorization is valid for this and subsequent care/treatment I receive at [facility] and will remain valid unless/until revoked in writing by me.     [facility]: As used in this document, [facility] means all [facility] owned and managed facilities, including, but not limited to, all health centers, surgery centers, clinics, urgent care centers, and hospitals.         [facility] complies with applicable Federal civil rights laws and does not discriminate on the basis of race, color, national origin, age, disability, or sex.  ATENCIÓN: si habla español, tiene a jansen disposición servicios gratuitos de asistencia lingüística. Llame al [phone].  CHÚ Ý: N?u b?n nói Ti?ng Vi?t, có các d?ch v? h? tr? ngôn ng? mi?n phí dành cho b?n. G?i s? [phone].        REGISTRATION AUTHORIZATION  Form No. 47776 (Rev. 3/25/2024)   Page 3 of 3

## 2025-04-01 NOTE — PATIENT INSTRUCTIONS
Cleanse with Vashe  Pat dry  Apply Mepilex AG foam, secure with tape or bandaid  Change every 2 days     May shower with protection over dressings on day's not changing wound dressing, may remove for showering and re-apply dressing after on days dressing changes area due.       Do not leave open to air    Return for md visit in 1 week.

## 2025-04-02 ENCOUNTER — HOSPITAL ENCOUNTER (OUTPATIENT)
Dept: WOUND CARE | Facility: HOSPITAL | Age: 75
Discharge: HOME | End: 2025-04-02
Attending: PEDIATRICS
Payer: MEDICARE

## 2025-04-02 VITALS
DIASTOLIC BLOOD PRESSURE: 79 MMHG | RESPIRATION RATE: 20 BRPM | OXYGEN SATURATION: 98 % | TEMPERATURE: 98 F | SYSTOLIC BLOOD PRESSURE: 138 MMHG | HEART RATE: 90 BPM

## 2025-04-02 DIAGNOSIS — T30.0 THERMAL BURN: Primary | ICD-10-CM

## 2025-04-02 PROCEDURE — 99213 OFFICE O/P EST LOW 20 MIN: CPT

## 2025-04-02 PROCEDURE — 99213 OFFICE O/P EST LOW 20 MIN: CPT | Mod: ,,, | Performed by: PEDIATRICS

## 2025-04-02 PROCEDURE — 27000999 HC MEDICAL RECORD PHOTO DOCUMENTATION
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
## 2025-04-07 ENCOUNTER — TELEPHONE (OUTPATIENT)
Dept: FAMILY MEDICINE | Facility: CLINIC | Age: 75
End: 2025-04-07
Payer: MEDICARE

## 2025-04-07 NOTE — TELEPHONE ENCOUNTER
----- Message from Tressa sent at 4/4/2025  3:56 PM CDT -----  Who Called: Fabiola Live is requesting assistance/information from provider's office.Symptoms (please be specific):  How long has patient had these symptoms:  List of preferred pharmacies on file (remove unneeded): [unfilled]If different, enter pharmacy into here including location and phone number: Preferred Method of Contact: Phone CallPatient's Preferred Phone Number on File: [phone] Best Call Back Number, if different:Additional Information: Pt is calling to speak with nurse about needles she is needing

## 2025-04-07 NOTE — TELEPHONE ENCOUNTER
Patient called to get a Rf on Lancets for her finger. I called [facility] in [place] and they state they have the order on hold, they thought the pt meant the needles for the ozempic is what she needed. They will RF lancets and Ozempic today, will be in by tomorrow. Pt was thankful for the help.

## 2025-04-08 NOTE — PATIENT INSTRUCTIONS
Healed Wound Instructions:Your wound is healed, it is extremely fragile at this stage; protect it from friction, wash it gently and pat dry. Apply Aquaphor healing ointment/moisturizer daily to site for next 1-2 weeks.  You have been discharged from wound care. If you have any additional questions or the wound should re-open, please call [facility] at [phone] for furthur instructions.

## 2025-04-10 ENCOUNTER — HOSPITAL ENCOUNTER (OUTPATIENT)
Dept: WOUND CARE | Facility: HOSPITAL | Age: 75
Discharge: HOME | End: 2025-04-10
Attending: PEDIATRICS
Payer: MEDICARE

## 2025-04-10 VITALS
SYSTOLIC BLOOD PRESSURE: 149 MMHG | DIASTOLIC BLOOD PRESSURE: 83 MMHG | OXYGEN SATURATION: 96 % | TEMPERATURE: 98 F | HEART RATE: 73 BPM | RESPIRATION RATE: 18 BRPM

## 2025-04-10 DIAGNOSIS — T30.0 THERMAL BURN: Primary | ICD-10-CM

## 2025-04-10 PROCEDURE — 99213 OFFICE O/P EST LOW 20 MIN: CPT | Mod: ,,, | Performed by: PEDIATRICS

## 2025-04-10 PROCEDURE — 99213 OFFICE O/P EST LOW 20 MIN: CPT

## 2025-04-10 PROCEDURE — 27000999 HC MEDICAL RECORD PHOTO DOCUMENTATION
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
## 2025-04-14 ENCOUNTER — TELEPHONE (OUTPATIENT)
Dept: FAMILY MEDICINE | Facility: CLINIC | Age: 75
End: 2025-04-14
Payer: MEDICARE

## 2025-04-14 NOTE — TELEPHONE ENCOUNTER
----- Message from Symone sent at 4/14/2025 11:43 AM CDT -----  Who Called: Fabiola Live is requesting assistance/information from provider's office.Symptoms (please be specific): n/a How long has patient had these symptoms:  n/aList of preferred pharmacies on file (remove unneeded): n/aPreferred Method of Contact: Phone CallPatient's Preferred Phone Number on File: [phone] Best Call Back Number, if different:Additional Information: Pt stated that she would like to received physical therapy in [place] instead of [place] because it is too far. Please advise.

## 2025-04-15 ENCOUNTER — TELEPHONE (OUTPATIENT)
Dept: FAMILY MEDICINE | Facility: CLINIC | Age: 75
End: 2025-04-15
Payer: MEDICARE

## 2025-04-15 NOTE — TELEPHONE ENCOUNTER
Patient came into the office today requesting to stop physical therapy in [place] because it is not helping and she is having trouble with transportation bringing her at the times scheduled.  She is requesting a referral to water aerobic therapy instead.

## 2025-04-23 ENCOUNTER — LAB VISIT (OUTPATIENT)
Dept: LAB | Facility: HOSPITAL | Age: 75
End: 2025-04-23
Payer: MEDICARE

## 2025-04-23 DIAGNOSIS — N18.32 TYPE 2 DIABETES MELLITUS WITH STAGE 3B CHRONIC KIDNEY DISEASE, WITHOUT LONG-TERM CURRENT USE OF INSULIN: ICD-10-CM

## 2025-04-23 DIAGNOSIS — E11.22 TYPE 2 DIABETES MELLITUS WITH STAGE 3B CHRONIC KIDNEY DISEASE, WITHOUT LONG-TERM CURRENT USE OF INSULIN: ICD-10-CM

## 2025-04-23 DIAGNOSIS — E78.2 MIXED HYPERLIPIDEMIA: ICD-10-CM

## 2025-04-23 LAB
ALBUMIN SERPL-MCNC: 3.6 G/DL (ref 3.4–4.8)
ALBUMIN/GLOB SERPL: 1 RATIO (ref 1.1–2)
ALP SERPL-CCNC: 67 UNIT/L (ref 40–150)
ALT SERPL-CCNC: 18 UNIT/L (ref 0–55)
ANION GAP SERPL CALC-SCNC: 5 MEQ/L
AST SERPL-CCNC: 17 UNIT/L (ref 11–45)
BASOPHILS # BLD AUTO: 0.04 X10(3)/MCL
BASOPHILS NFR BLD AUTO: 0.8 %
BILIRUB SERPL-MCNC: 0.9 MG/DL
BUN SERPL-MCNC: 12.2 MG/DL (ref 9.8–20.1)
CALCIUM SERPL-MCNC: 9.8 MG/DL (ref 8.4–10.2)
CHLORIDE SERPL-SCNC: 104 MMOL/L (ref 98–107)
CHOLEST SERPL-MCNC: 206 MG/DL
CHOLEST/HDLC SERPL: 5 {RATIO} (ref 0–5)
CO2 SERPL-SCNC: 30 MMOL/L (ref 23–31)
CREAT SERPL-MCNC: 1 MG/DL (ref 0.55–1.02)
CREAT/UREA NIT SERPL: 12
EOSINOPHIL # BLD AUTO: 0.12 X10(3)/MCL (ref 0–0.9)
EOSINOPHIL NFR BLD AUTO: 2.5 %
ERYTHROCYTE [DISTWIDTH] IN BLOOD BY AUTOMATED COUNT: 14.1 % (ref 11.5–17)
EST. AVERAGE GLUCOSE BLD GHB EST-MCNC: 269 MG/DL
GFR SERPLBLD CREATININE-BSD FMLA CKD-EPI: 59 ML/MIN/1.73/M2
GLOBULIN SER-MCNC: 3.6 GM/DL (ref 2.4–3.5)
GLUCOSE SERPL-MCNC: 132 MG/DL (ref 82–115)
HBA1C MFR BLD: 11 %
HCT VFR BLD AUTO: 46.9 % (ref 37–47)
HDLC SERPL-MCNC: 43 MG/DL (ref 35–60)
HGB BLD-MCNC: 14.2 G/DL (ref 12–16)
IMM GRANULOCYTES # BLD AUTO: 0.01 X10(3)/MCL (ref 0–0.04)
IMM GRANULOCYTES NFR BLD AUTO: 0.2 %
LDLC SERPL CALC-MCNC: 128 MG/DL (ref 50–140)
LYMPHOCYTES # BLD AUTO: 1.74 X10(3)/MCL (ref 0.6–4.6)
LYMPHOCYTES NFR BLD AUTO: 36.7 %
MCH RBC QN AUTO: 29.2 PG (ref 27–31)
MCHC RBC AUTO-ENTMCNC: 30.3 G/DL (ref 33–36)
MCV RBC AUTO: 96.5 FL (ref 80–94)
MONOCYTES # BLD AUTO: 0.44 X10(3)/MCL (ref 0.1–1.3)
MONOCYTES NFR BLD AUTO: 9.3 %
NEUTROPHILS # BLD AUTO: 2.39 X10(3)/MCL (ref 2.1–9.2)
NEUTROPHILS NFR BLD AUTO: 50.5 %
PLATELET # BLD AUTO: 179 X10(3)/MCL (ref 130–400)
PMV BLD AUTO: 11.5 FL (ref 7.4–10.4)
POTASSIUM SERPL-SCNC: 4.7 MMOL/L (ref 3.5–5.1)
PROT SERPL-MCNC: 7.2 GM/DL (ref 5.8–7.6)
RBC # BLD AUTO: 4.86 X10(6)/MCL (ref 4.2–5.4)
SODIUM SERPL-SCNC: 139 MMOL/L (ref 136–145)
TRIGL SERPL-MCNC: 177 MG/DL (ref 37–140)
VLDLC SERPL CALC-MCNC: 35 MG/DL
WBC # BLD AUTO: 4.74 X10(3)/MCL (ref 4.5–11.5)

## 2025-04-23 PROCEDURE — 83036 HEMOGLOBIN GLYCOSYLATED A1C: CPT

## 2025-04-23 PROCEDURE — 36415 COLL VENOUS BLD VENIPUNCTURE: CPT

## 2025-04-23 PROCEDURE — 85025 COMPLETE CBC W/AUTO DIFF WBC: CPT

## 2025-04-23 PROCEDURE — 80053 COMPREHEN METABOLIC PANEL: CPT

## 2025-04-23 PROCEDURE — 80061 LIPID PANEL: CPT

## 2025-04-28 ENCOUNTER — TELEPHONE (OUTPATIENT)
Dept: FAMILY MEDICINE | Facility: CLINIC | Age: 75
End: 2025-04-28
Payer: MEDICARE

## 2025-04-28 NOTE — TELEPHONE ENCOUNTER
1. Are there any outstanding tasks in the patient's chart? (Ex. Labs, MMG)?  PT referral - starts 1st of may.  2. Do we have any outstanding/Pending referrals?    3. Has the patient been seen in an ER, Urgent Care or been admitted to the hospital since last office visit with our office?   No   4. Has the patient seen any other health care provider (doctors) since last visit? Wound care    5. Has the patient had any blood work or x-rays done since last visit?   No

## 2025-04-29 ENCOUNTER — TELEPHONE (OUTPATIENT)
Dept: FAMILY MEDICINE | Facility: CLINIC | Age: 75
End: 2025-04-29
Payer: MEDICARE

## 2025-04-29 DIAGNOSIS — N39.41 URGE INCONTINENCE OF URINE: ICD-10-CM

## 2025-04-29 RX ORDER — OXYBUTYNIN CHLORIDE 5 MG/1
5 TABLET, EXTENDED RELEASE ORAL DAILY
Qty: 90 TABLET | Refills: 3 | Status: SHIPPED | OUTPATIENT
Start: 2025-04-29 | End: 2026-04-29

## 2025-04-29 NOTE — TELEPHONE ENCOUNTER
----- Message from Rishi sent at 4/29/2025  4:23 PM CDT -----  Who Called: Fabiola Moody is returning phone callWho Left Message for Patient:Does the patient know what this is regarding?:Preferred Method of Contact: Phone CallPatient's Preferred Phone Number on File: [phone] Best Call Back Number, if different:Additional Information: pt called to speak with nurse needs meds oxybutynin (DITROPAN-XL) 5 MG [id number] if fill for 3mo its free pls advise

## 2025-05-05 ENCOUNTER — OFFICE VISIT (OUTPATIENT)
Dept: FAMILY MEDICINE | Facility: CLINIC | Age: 75
End: 2025-05-05
Payer: MEDICARE

## 2025-05-05 VITALS
RESPIRATION RATE: 18 BRPM | BODY MASS INDEX: 44.53 KG/M2 | HEART RATE: 90 BPM | OXYGEN SATURATION: 96 % | DIASTOLIC BLOOD PRESSURE: 81 MMHG | TEMPERATURE: 98 F | WEIGHT: 251.38 LBS | SYSTOLIC BLOOD PRESSURE: 134 MMHG

## 2025-05-05 DIAGNOSIS — I10 PRIMARY HYPERTENSION: Primary | ICD-10-CM

## 2025-05-05 DIAGNOSIS — E78.2 MIXED HYPERLIPIDEMIA: ICD-10-CM

## 2025-05-05 DIAGNOSIS — M17.0 PRIMARY OSTEOARTHRITIS OF BOTH KNEES: ICD-10-CM

## 2025-05-05 DIAGNOSIS — E11.22 TYPE 2 DIABETES MELLITUS WITH STAGE 3B CHRONIC KIDNEY DISEASE, WITHOUT LONG-TERM CURRENT USE OF INSULIN: ICD-10-CM

## 2025-05-05 DIAGNOSIS — N18.32 TYPE 2 DIABETES MELLITUS WITH STAGE 3B CHRONIC KIDNEY DISEASE, WITHOUT LONG-TERM CURRENT USE OF INSULIN: ICD-10-CM

## 2025-05-05 PROCEDURE — 1125F AMNT PAIN NOTED PAIN PRSNT: CPT | Mod: CPTII,,,

## 2025-05-05 PROCEDURE — 1159F MED LIST DOCD IN RCRD: CPT | Mod: CPTII,,,

## 2025-05-05 PROCEDURE — 3075F SYST BP GE 130 - 139MM HG: CPT | Mod: CPTII,,,

## 2025-05-05 PROCEDURE — 1101F PT FALLS ASSESS-DOCD LE1/YR: CPT | Mod: CPTII,,,

## 2025-05-05 PROCEDURE — 3288F FALL RISK ASSESSMENT DOCD: CPT | Mod: CPTII,,,

## 2025-05-05 PROCEDURE — 3046F HEMOGLOBIN A1C LEVEL >9.0%: CPT | Mod: CPTII,,,

## 2025-05-05 PROCEDURE — 99214 OFFICE O/P EST MOD 30 MIN: CPT | Mod: ,,,

## 2025-05-05 PROCEDURE — 3079F DIAST BP 80-89 MM HG: CPT | Mod: CPTII,,,

## 2025-05-05 PROCEDURE — 1160F RVW MEDS BY RX/DR IN RCRD: CPT | Mod: CPTII,,,

## 2025-05-05 RX ORDER — BLOOD-GLUCOSE METER
EACH MISCELLANEOUS 4 TIMES DAILY
COMMUNITY
Start: 2025-02-03

## 2025-05-05 RX ORDER — SEMAGLUTIDE 0.68 MG/ML
0.5 INJECTION, SOLUTION SUBCUTANEOUS
Qty: 9 ML | Refills: 2 | Status: SHIPPED | OUTPATIENT
Start: 2025-05-05 | End: 2025-05-06 | Stop reason: SDUPTHER

## 2025-05-05 RX ORDER — DAPAGLIFLOZIN 10 MG/1
10 TABLET, FILM COATED ORAL DAILY
Qty: 90 TABLET | Refills: 3 | Status: SHIPPED | OUTPATIENT
Start: 2025-05-05

## 2025-05-05 RX ORDER — LANCETS 33 GAUGE
EACH MISCELLANEOUS
COMMUNITY
Start: 2025-04-09

## 2025-05-05 RX ORDER — PRAVASTATIN SODIUM 80 MG/1
80 TABLET ORAL DAILY
Qty: 90 TABLET | Refills: 3 | Status: SHIPPED | OUTPATIENT
Start: 2025-05-05 | End: 2026-05-05

## 2025-05-05 NOTE — ASSESSMENT & PLAN NOTE
Bilateral knee osteoarthritis on x-rays in September 2024.  Voltaren gel did not help her pain.  Patient can not take NSAIDs due to chronic kidney disease.  Patient had bilateral steroid injections.  Pain resolved in the left knee, but still severe in the right knee.  No improvement with physical therapy.  We will refer to ortho.

## 2025-05-05 NOTE — ASSESSMENT & PLAN NOTE
Blood pressure well-controlled.  Hypertension Medications               hydroCHLOROthiazide 12.5 MG Tab Take 1 tablet (12.5 mg total) by mouth once daily.          AHA/ACC goal <130/80. JNC8 goal <140/90 (all ages if DM or CKD OR <60), <150/90 (>60 w/o CKD or DM)  Low Sodium Diet (DASH Diet - Less than 2 grams of sodium per day).  Monitor blood pressure daily and log. Report consistent numbers greater than 140/90.  Maintain healthy weight with goal BMI <30. Exercise 30 minutes per day, 5 days per week.  Smoking cessation encouraged to aid in BP reduction.

## 2025-05-05 NOTE — ASSESSMENT & PLAN NOTE
A1c worsened 211.  Patient has not been following ADA diet.  Thoroughly discussed diet.  We will increase Ozempic after 2 more doses.  We will also increase Farxiga today to 10 mg daily.  Continue Actos and glipizide.    Lab Results   Component Value Date    HGBA1C 11.0 (H) 04/23/2025    HGBA1C 10.0 (H) 02/03/2025    .00 04/23/2025    CREATININE 1.00 04/23/2025     On ACE and Statin according to guidelines.  Discussed caution with SGLT2s + diuretics as concomitant use can cause volume depletion. Discussed caution with DPP-Ivs and HF risk.  Follow ADA Diet. Avoid soda, sweets, and limit carbs (no more than 45-50 grams per meal).  Maintain healthy weight with goal BMI <30.  Exercise 5 times per week for 30 minutes per day.  Stressed importance of daily foot exams.  Up to date on DM retinal and foot exams.

## 2025-05-05 NOTE — PROGRESS NOTES
[facility]  Hanna Gupta MD    Patient ID: [id number]     Chief Complaint: Follow-up      HPI:     Fabiola Mejia is a 74 y.o. female here today for follow-up of chronic conditions.     A1c 11.0. She is on Farxiga 5 mg daily, glipizide 5mg daily, Actos 15mg daily, and Ozempic 0.25mg daily.  Patient reports he has only taken 2 doses for Ozempic so far.  She denied any side effects.    Patient reports right knee pain.  She had some improvement with steroid injection, but her pain is severe again.  She has an appointment with Orthopedics scheduled in 6 months.  She tried physical therapy but stopped because the pain worsened.    Past Medical History:   Diagnosis Date    Connective tissue disorder     Degenerative arthritis of knee, bilateral     Disorder of rotator cuff 02/03/2025    Effusion of knee 02/03/2025    Fibromyalgia     Osteopenia     Strain of flexor muscle of hip 02/03/2025        Past Surgical History:   Procedure Laterality Date    TONSILLECTOMY          Social History     Tobacco Use    Smoking status: Never    Smokeless tobacco: Never   Substance and Sexual Activity    Alcohol use: Never    Drug use: Never    Sexual activity: Not Currently        Current Outpatient Medications   Medication Instructions    ACCU-CHEK GUIDE ME GLUCOSE MTR Misc 4 times daily    acetaminophen (TYLENOL) 650 mg, Every 8 hours    berberine chloride 500 mg Cap 2 capsules, Oral, Daily    blood pressure monitor Kit 1 each, Misc.(Non-Drug; Combo Route), Daily    blood sugar diagnostic Strp To check BG 1 times daily, to use with insurance preferred meter    blood sugar diagnostic Strp To check BG four times daily, to use with insurance preferred meter    blood-glucose meter kit To check BG 1 times daily, to use with insurance preferred meter    blood-glucose meter kit To check BG four times daily, to use with insurance preferred meter    clobetasoL (TEMOVATE) 0.05 % cream     dapagliflozin propanediol (FARXIGA) 10  "mg, Oral, Daily    desonide (DESOWEN) 0.05 % cream     ezetimibe (ZETIA) 10 mg, Oral, Daily    glipiZIDE (GLUCOTROL) 5 MG tablet Take 1 tab po daily.    hydroCHLOROthiazide 12.5 mg, Oral, Daily    lancets Misc To check BG 1 times daily, to use with insurance preferred meter    lancets Misc To check BG four times daily, to use with insurance preferred meter    multivitamin (THERAGRAN) per tablet 1 tablet, Daily    mupirocin (BACTROBAN) 2 % ointment Topical (Top), 2 times daily, Apply to wound on left thigh    ONETOUCH DELICA PLUS LANCET 33 gauge Misc Apply topically.    oxybutynin (DITROPAN-XL) 5 mg, Oral, Daily    OZEMPIC 0.5 mg, Subcutaneous, Every 7 days    pantoprazole (PROTONIX) 40 mg, Daily    pectin/vit B6/mins 4/c.vinegar (APPLE CIDER VINEGAR COMPLEX ORAL) 1 capsule, Daily    pioglitazone (ACTOS) 15 MG tablet Take 1 tab po daily.    pravastatin (PRAVACHOL) 40 MG tablet Take 1 tab po Q hs.    pravastatin (PRAVACHOL) 80 mg, Oral, Daily    UNABLE TO FIND 1 capsule, Daily    UNABLE TO FIND 850 mg, Daily    VITAMIN A ORAL 2,400 mcg, Daily    vitamin E 400 Units, Daily       Review of patient's allergies indicates:   Allergen Reactions    Codeine Other (See Comments)    Gabapentin     Hydroxychloroquine      Other Reaction(s): Loose bowel movement    Methocarbamol      Other Reaction(s): makes her sleepy and feel "funny"    Acetaminophen-codeine Other (See Comments)    Tramadol Rash        Patient Care Team:  Hanna Gupta MD as PCP - General (Family Medicine)  Shikha Ramirez FNP as Nurse Practitioner (Nephrology)  Mariana Whelan MD (Rheumatology)  Aries Mcpherson MD as Consulting Physician (Ophthalmology)  Leny Abdul LPN as Care Coordinator     Subjective:     Review of Systems    12 point review of systems conducted, negative except as stated in the history of present illness. See HPI for details.    Objective:     Visit Vitals  /81 (Patient Position: Sitting)   Pulse 90   Temp 98.3 °F (36.8 °C) " (Oral)   Resp 18   Wt 114 kg (251 lb 6.1 oz)   SpO2 96%   BMI 44.53 kg/m²       Physical Exam  Vitals and nursing note reviewed.   Constitutional:       General: She is not in acute distress.     Appearance: She is not ill-appearing.      Comments: Ambulating with a cane   HENT:      Head: Normocephalic and atraumatic.   Eyes:      General: No scleral icterus.     Extraocular Movements: Extraocular movements intact.      Conjunctiva/sclera: Conjunctivae normal.   Cardiovascular:      Rate and Rhythm: Normal rate and regular rhythm.      Heart sounds: No murmur heard.     No friction rub. No gallop.   Pulmonary:      Effort: Pulmonary effort is normal. No respiratory distress.      Breath sounds: Normal breath sounds. No wheezing, rhonchi or rales.   Musculoskeletal:         General: Normal range of motion.      Right lower leg: No edema.      Left lower leg: No edema.   Skin:     General: Skin is warm and dry.   Neurological:      General: No focal deficit present.      Mental Status: She is alert.   Psychiatric:         Mood and Affect: Mood normal.         Labs Reviewed:     Chemistry:  Lab Results   Component Value Date     04/23/2025    K 4.7 04/23/2025    BUN 12.2 04/23/2025    CREATININE 1.00 04/23/2025    EGFRNORACEVR 59 04/23/2025    CALCIUM 9.8 04/23/2025    ALKPHOS 67 04/23/2025    ALBUMIN 3.6 04/23/2025    BILIDIR 0.3 02/21/2023    IBILI 0.40 02/21/2023    AST 17 04/23/2025    ALT 18 04/23/2025    ICJZIXCT08AV 54.5 02/29/2024    TSH 2.420 11/08/2024    DPAGVD0FKNW 0.78 02/29/2024        Lab Results   Component Value Date    HGBA1C 11.0 (H) 04/23/2025        Hematology:  Lab Results   Component Value Date    WBC 4.74 04/23/2025    HGB 14.2 04/23/2025    HCT 46.9 04/23/2025     04/23/2025       Lipid Panel:  Lab Results   Component Value Date    CHOL 206 (H) 04/23/2025    HDL 43 04/23/2025    .00 04/23/2025    TRIG 177 (H) 04/23/2025    TOTALCHOLEST 5 04/23/2025        Urine:  Lab  Results   Component Value Date    APPEARANCEUA Clear 10/18/2024    SGUA 1.015 10/18/2024    PROTEINUA Negative 10/18/2024    KETONESUA Negative 10/18/2024    LEUKOCYTESUR Negative 10/18/2024    RBCUA None Seen 10/18/2024    WBCUA None Seen 10/18/2024    BACTERIA Rare 10/18/2024    SQEPUA Occ (A) 04/22/2024    HYALINECASTS None Seen 04/22/2024    CREATRANDUR 52.4 10/18/2024    PROTEINURINE <6.8 10/18/2024        Assessment:       ICD-10-CM ICD-9-CM   1. Primary hypertension  I10 401.9   2. Type 2 diabetes mellitus with stage 3b chronic kidney disease, without long-term current use of insulin  E11.22 250.40    N18.32 585.3   3. Mixed hyperlipidemia  E78.2 272.2   4. Primary osteoarthritis of both knees  M17.0 715.16        Plan:     1. Primary hypertension  Assessment & Plan:  Blood pressure well-controlled.  Hypertension Medications               hydroCHLOROthiazide 12.5 MG Tab Take 1 tablet (12.5 mg total) by mouth once daily.          AHA/ACC goal <130/80. JNC8 goal <140/90 (all ages if DM or CKD OR <60), <150/90 (>60 w/o CKD or DM)  Low Sodium Diet (DASH Diet - Less than 2 grams of sodium per day).  Monitor blood pressure daily and log. Report consistent numbers greater than 140/90.  Maintain healthy weight with goal BMI <30. Exercise 30 minutes per day, 5 days per week.  Smoking cessation encouraged to aid in BP reduction.      Orders:  -     CBC Auto Differential; Future; Expected date: 08/05/2025  -     Comprehensive Metabolic Panel; Future; Expected date: 08/05/2025    2. Type 2 diabetes mellitus with stage 3b chronic kidney disease, without long-term current use of insulin  Overview:  Farxiga 10 mg daily  Glipizide 5 mg daily  Actos 15 mg daily  Ozempic 0.5 mg daily    Assessment & Plan:  A1c worsened 211.  Patient has not been following ADA diet.  Thoroughly discussed diet.  We will increase Ozempic after 2 more doses.  We will also increase Farxiga today to 10 mg daily.  Continue Actos and glipizide.    Lab  Results   Component Value Date    HGBA1C 11.0 (H) 04/23/2025    HGBA1C 10.0 (H) 02/03/2025    .00 04/23/2025    CREATININE 1.00 04/23/2025     On ACE and Statin according to guidelines.  Discussed caution with SGLT2s + diuretics as concomitant use can cause volume depletion. Discussed caution with DPP-Ivs and HF risk.  Follow ADA Diet. Avoid soda, sweets, and limit carbs (no more than 45-50 grams per meal).  Maintain healthy weight with goal BMI <30.  Exercise 5 times per week for 30 minutes per day.  Stressed importance of daily foot exams.  Up to date on DM retinal and foot exams.          Orders:  -     Hemoglobin A1C; Future; Expected date: 08/05/2025  -     semaglutide (OZEMPIC) 0.25 mg or 0.5 mg (2 mg/3 mL) pen injector; Inject 0.5 mg into the skin every 7 days.  Dispense: 9 mL; Refill: 2    3. Mixed hyperlipidemia  Overview:  Pravastatin 40 mg daily  Zetia 10mg daily.     Assessment & Plan:  LDL improved with the addition of Zetia, but still far above goal of <70.  Increase pravastatin to 80 mg daily.  Continue Zetia 10 mg daily    Lab Results   Component Value Date    .00 04/23/2025    TRIG 177 (H) 04/23/2025    HDL 43 04/23/2025    TOTALCHOLEST 5 04/23/2025     Educated on importance of dietary modifications. Follow a low cholesterol, low saturated fat diet with less that 200mg of cholesterol a day.  Avoid fried foods and high saturated fats (high saturated fats less than 7% of calories).  Increase dietary fiber.  Regular exercise can reduce LDL and raise HDL. Stressed importance of physical activity 5 times per week for 30 minutes per day.       Orders:  -     Lipid Panel; Future; Expected date: 08/05/2025    4. Primary osteoarthritis of both knees  Assessment & Plan:  Bilateral knee osteoarthritis on x-rays in September 2024.  Voltaren gel did not help her pain.  Patient can not take NSAIDs due to chronic kidney disease.  Patient had bilateral steroid injections.  Pain resolved in the left  knee, but still severe in the right knee.  No improvement with physical therapy.  We will refer to ortho.    Orders:  -     Ambulatory referral/consult to Orthopedics; Future; Expected date: 05/12/2025    Other orders  -     pravastatin (PRAVACHOL) 80 MG tablet; Take 1 tablet (80 mg total) by mouth once daily.  Dispense: 90 tablet; Refill: 3  -     dapagliflozin propanediol (FARXIGA) 10 mg tablet; Take 1 tablet (10 mg total) by mouth once daily.  Dispense: 90 tablet; Refill: 3         Follow up in about 4 weeks (around 6/2/2025). In addition to their scheduled follow up, the patient has also been instructed to follow up on as needed basis.     Future Appointments   Date Time Provider Department Center   5/21/2025 10:30 AM Shikha Ramirez FNP [facility]ONNE [facility]    6/2/2025  1:20 PM Hanna Gupta MD [facility]   6/16/2025 10:00 AM Mariana Whelan MD [facility] M[facility]IE [facility]        Hanna Gupta MD

## 2025-05-05 NOTE — ASSESSMENT & PLAN NOTE
LDL improved with the addition of Zetia, but still far above goal of <70.  Increase pravastatin to 80 mg daily.  Continue Zetia 10 mg daily    Lab Results   Component Value Date    .00 04/23/2025    TRIG 177 (H) 04/23/2025    HDL 43 04/23/2025    TOTALCHOLEST 5 04/23/2025     Educated on importance of dietary modifications. Follow a low cholesterol, low saturated fat diet with less that 200mg of cholesterol a day.  Avoid fried foods and high saturated fats (high saturated fats less than 7% of calories).  Increase dietary fiber.  Regular exercise can reduce LDL and raise HDL. Stressed importance of physical activity 5 times per week for 30 minutes per day.

## 2025-05-06 ENCOUNTER — TELEPHONE (OUTPATIENT)
Dept: FAMILY MEDICINE | Facility: CLINIC | Age: 75
End: 2025-05-06
Payer: MEDICARE

## 2025-05-06 DIAGNOSIS — N18.32 TYPE 2 DIABETES MELLITUS WITH STAGE 3B CHRONIC KIDNEY DISEASE, WITHOUT LONG-TERM CURRENT USE OF INSULIN: ICD-10-CM

## 2025-05-06 DIAGNOSIS — E11.22 TYPE 2 DIABETES MELLITUS WITH STAGE 3B CHRONIC KIDNEY DISEASE, WITHOUT LONG-TERM CURRENT USE OF INSULIN: ICD-10-CM

## 2025-05-06 RX ORDER — SEMAGLUTIDE 0.68 MG/ML
0.5 INJECTION, SOLUTION SUBCUTANEOUS
Qty: 9 ML | Refills: 2 | Status: SHIPPED | OUTPATIENT
Start: 2025-05-06

## 2025-05-06 NOTE — TELEPHONE ENCOUNTER
Copied from CRM #[id number]. Topic: General Inquiry - Patient Advice  >> May 6, 2025  1:29 PM Tiffany wrote:  Who Called: Fabiola Mejia    Caller is requesting assistance/information from provider's office.    Symptoms (please be specific):  PT is hurting her R leg too much     How long has patient had these symptoms:  2 days    List of preferred pharmacies on file (remove unneeded): n/a    Preferred Method of Contact: Phone Call    Patient's Preferred Phone Number on File: [phone]     Best Call Back Number, if different: n/a    Additional Information: pt states the PT is hurting too much, she can't do everything they are asking her to do.    Pt is also asking for the emaglutide (OZEMPIC) 0.25 mg or 0.5 mg (2 mg/3 mL) pen injector to be sent to: [facility]/pharmacy #[id number] [facility] [address] AT [facility]   Phone: [phone]  Fax: [phone]

## 2025-05-14 ENCOUNTER — OFFICE VISIT (OUTPATIENT)
Dept: ORTHOPEDICS | Facility: CLINIC | Age: 75
End: 2025-05-14
Payer: MEDICARE

## 2025-05-14 ENCOUNTER — LAB VISIT (OUTPATIENT)
Dept: LAB | Facility: HOSPITAL | Age: 75
End: 2025-05-14
Attending: NURSE PRACTITIONER
Payer: MEDICARE

## 2025-05-14 VITALS
WEIGHT: 251.31 LBS | HEIGHT: 63 IN | BODY MASS INDEX: 44.53 KG/M2 | HEART RATE: 92 BPM | SYSTOLIC BLOOD PRESSURE: 115 MMHG | DIASTOLIC BLOOD PRESSURE: 72 MMHG

## 2025-05-14 DIAGNOSIS — M25.562 PAIN IN BOTH KNEES, UNSPECIFIED CHRONICITY: Primary | ICD-10-CM

## 2025-05-14 DIAGNOSIS — M25.561 PAIN IN BOTH KNEES, UNSPECIFIED CHRONICITY: Primary | ICD-10-CM

## 2025-05-14 DIAGNOSIS — M17.0 PRIMARY OSTEOARTHRITIS OF BOTH KNEES: ICD-10-CM

## 2025-05-14 DIAGNOSIS — N18.32 CKD STAGE G3B/A1, GFR 30-44 AND ALBUMIN CREATININE RATIO <30 MG/G: ICD-10-CM

## 2025-05-14 LAB
ALBUMIN SERPL-MCNC: 3.4 G/DL (ref 3.4–4.8)
ALBUMIN/GLOB SERPL: 0.9 RATIO (ref 1.1–2)
ALP SERPL-CCNC: 65 UNIT/L (ref 40–150)
ALT SERPL-CCNC: 17 UNIT/L (ref 0–55)
ANION GAP SERPL CALC-SCNC: 8 MEQ/L
AST SERPL-CCNC: 14 UNIT/L (ref 11–45)
BILIRUB SERPL-MCNC: 0.6 MG/DL
BILIRUB UR QL STRIP.AUTO: NEGATIVE
BUN SERPL-MCNC: 34.9 MG/DL (ref 9.8–20.1)
CALCIUM SERPL-MCNC: 9.4 MG/DL (ref 8.4–10.2)
CHLORIDE SERPL-SCNC: 109 MMOL/L (ref 98–107)
CLARITY UR: CLEAR
CO2 SERPL-SCNC: 29 MMOL/L (ref 23–31)
COLOR UR AUTO: ABNORMAL
CREAT SERPL-MCNC: 1.21 MG/DL (ref 0.55–1.02)
CREAT UR-MCNC: 122.7 MG/DL (ref 45–106)
CREAT/UREA NIT SERPL: 29
GFR SERPLBLD CREATININE-BSD FMLA CKD-EPI: 47 ML/MIN/1.73/M2
GLOBULIN SER-MCNC: 3.8 GM/DL (ref 2.4–3.5)
GLUCOSE SERPL-MCNC: 94 MG/DL (ref 82–115)
GLUCOSE UR QL STRIP: 250
HGB UR QL STRIP: NEGATIVE
KETONES UR QL STRIP: NEGATIVE
LEUKOCYTE ESTERASE UR QL STRIP: NEGATIVE
NITRITE UR QL STRIP: NEGATIVE
PH UR STRIP: 5.5 [PH]
POTASSIUM SERPL-SCNC: 5 MMOL/L (ref 3.5–5.1)
PROT SERPL-MCNC: 7.2 GM/DL (ref 5.8–7.6)
PROT UR QL STRIP: NEGATIVE
PROT UR STRIP-MCNC: 12.4 MG/DL
SODIUM SERPL-SCNC: 146 MMOL/L (ref 136–145)
SP GR UR STRIP.AUTO: 1.02 (ref 1–1.03)
URINE PROTEIN/CREATININE RATIO (OLG): 0.1
UROBILINOGEN UR STRIP-ACNC: 0.2

## 2025-05-14 PROCEDURE — 3288F FALL RISK ASSESSMENT DOCD: CPT | Mod: CPTII,,, | Performed by: ORTHOPAEDIC SURGERY

## 2025-05-14 PROCEDURE — 3046F HEMOGLOBIN A1C LEVEL >9.0%: CPT | Mod: CPTII,,, | Performed by: ORTHOPAEDIC SURGERY

## 2025-05-14 PROCEDURE — 99203 OFFICE O/P NEW LOW 30 MIN: CPT | Mod: ,,, | Performed by: ORTHOPAEDIC SURGERY

## 2025-05-14 PROCEDURE — 3074F SYST BP LT 130 MM HG: CPT | Mod: CPTII,,, | Performed by: ORTHOPAEDIC SURGERY

## 2025-05-14 PROCEDURE — 1159F MED LIST DOCD IN RCRD: CPT | Mod: CPTII,,, | Performed by: ORTHOPAEDIC SURGERY

## 2025-05-14 PROCEDURE — 80053 COMPREHEN METABOLIC PANEL: CPT

## 2025-05-14 PROCEDURE — 82570 ASSAY OF URINE CREATININE: CPT

## 2025-05-14 PROCEDURE — 36415 COLL VENOUS BLD VENIPUNCTURE: CPT

## 2025-05-14 PROCEDURE — 3078F DIAST BP <80 MM HG: CPT | Mod: CPTII,,, | Performed by: ORTHOPAEDIC SURGERY

## 2025-05-14 PROCEDURE — 1160F RVW MEDS BY RX/DR IN RCRD: CPT | Mod: CPTII,,, | Performed by: ORTHOPAEDIC SURGERY

## 2025-05-14 PROCEDURE — 81003 URINALYSIS AUTO W/O SCOPE: CPT

## 2025-05-14 PROCEDURE — 1101F PT FALLS ASSESS-DOCD LE1/YR: CPT | Mod: CPTII,,, | Performed by: ORTHOPAEDIC SURGERY

## 2025-05-15 NOTE — PROGRESS NOTES
"Subjective:    CC: Pain of the Left Knee and Pain of the Right Knee (RIKY knee pain. Pain radiates down legs. Went through PT but didn't help. Pain is constant but primarily when she walks and gets in her car. No swelling that she's aware of. Ambulating with cane. Mobility is ok, has difficulty standing up and sitting down. Taking tylenol arthritis at night. Has difficulty bending them. Pain between R and L alternates. No numbness or tingling. No other concerns with them. )       HPI:  Patient comes in today complaining of bilateral knee pain.  Patient states she does have a history of underlying arthritis in both knees.  She states she is going to physical therapy without relief.  She continues to have pain when getting up from a seated position long standing and walking.  She currently ambulates with a cane.    ROS: Refer to HPI for pertinent ROS. All other 12 point systems negative.    Objective:  Vitals:    05/14/25 0837   BP: 115/72   BP Location: Right arm   Patient Position: Sitting   Pulse: 92   Weight: 114 kg (251 lb 5.2 oz)   Height: 5' 3" (1.6 m)        Physical Exam:  The patient is well-nourished, well-developed, in no apparent distress, pleasant and cooperative. Examination of the bilateral lower extremities,  compartments are soft and warm. Skin is intact. There are no signs or symptoms of DVT or infection. There is a small joint effusion. There is no erythema. Tenderness to palpation along the medial and lateral joint line bilaterally, right knee range of motion is 5-100; left knee range of motion is 5-100. The knee is stable to stressing with varus and valgus. Negative anterior and posterior drawer.   Negative Lachman´s.  Negative Juanito's test. Patella grind is positive, negative for apprehension.  Patient is neurovascularly intact distally.      Images:  X-rays three views of the left and right knee demonstrates underlying arthritis. Images Reviewed and discussed with patient.    Assessment:  1. " Pain in both knees, unspecified chronicity  - X-Ray Knee 3 View Left; Future  - X-Ray Knee 3 View Right; Future    2. Primary osteoarthritis of both knees  - Ambulatory referral/consult to Orthopedics        Plan:  At this time we discussed her physical exam and x-ray findings.  We have discussed various treatment options going forward.  We will continue conservative treatment.  We have discussed a total knee arthroplasty.  We have discussed the gel injections, we will set this up at her convenience.    Follow UP: No follow-ups on file.

## 2025-05-21 ENCOUNTER — TELEPHONE (OUTPATIENT)
Dept: FAMILY MEDICINE | Facility: CLINIC | Age: 75
End: 2025-05-21
Payer: MEDICARE

## 2025-05-21 ENCOUNTER — OFFICE VISIT (OUTPATIENT)
Dept: NEPHROLOGY | Facility: CLINIC | Age: 75
End: 2025-05-21
Payer: MEDICARE

## 2025-05-21 VITALS
BODY MASS INDEX: 45 KG/M2 | HEIGHT: 63 IN | RESPIRATION RATE: 18 BRPM | OXYGEN SATURATION: 100 % | WEIGHT: 254 LBS | HEART RATE: 87 BPM | TEMPERATURE: 98 F | DIASTOLIC BLOOD PRESSURE: 84 MMHG | SYSTOLIC BLOOD PRESSURE: 139 MMHG

## 2025-05-21 DIAGNOSIS — I10 PRIMARY HYPERTENSION: ICD-10-CM

## 2025-05-21 DIAGNOSIS — E11.22 TYPE 2 DIABETES MELLITUS WITH STAGE 3A CHRONIC KIDNEY DISEASE, WITHOUT LONG-TERM CURRENT USE OF INSULIN: ICD-10-CM

## 2025-05-21 DIAGNOSIS — E66.01 SEVERE OBESITY (BMI >= 40): ICD-10-CM

## 2025-05-21 DIAGNOSIS — N18.31 CKD STAGE G3A/A1, GFR 45-59 AND ALBUMIN CREATININE RATIO <30 MG/G: Primary | ICD-10-CM

## 2025-05-21 DIAGNOSIS — N18.31 TYPE 2 DIABETES MELLITUS WITH STAGE 3A CHRONIC KIDNEY DISEASE, WITHOUT LONG-TERM CURRENT USE OF INSULIN: ICD-10-CM

## 2025-05-21 PROCEDURE — 99213 OFFICE O/P EST LOW 20 MIN: CPT | Mod: PBBFAC | Performed by: NURSE PRACTITIONER

## 2025-05-21 RX ORDER — HYDROCHLOROTHIAZIDE 25 MG/1
25 TABLET ORAL DAILY
Qty: 90 TABLET | Refills: 3 | Status: SHIPPED | OUTPATIENT
Start: 2025-05-21 | End: 2026-05-21

## 2025-05-21 NOTE — TELEPHONE ENCOUNTER
Copied from CRM #[id number]. Topic: Medications - Medication Refill  >> May 21, 2025 10:18 AM Angélica wrote:  Who Called: Fabiola Mejia    Refill or New Rx:Refill  RX Name and Strength:semaglutide (OZEMPIC) 0.25 mg or 0.5 mg (2 mg/3 mL) pen injector   How is the patient currently taking it? (ex. 1XDay):  Is this a 30 day or 90 day RX:  Local or Mail Order:local  List of preferred pharmacies on file (remove unneeded): [facility]/pharmacy #[id number] - [place], [place] - [address] AT [facility]   Phone: [phone]  Fax: [phone]        If different Pharmacy is requested, enter Pharmacy information here including location and phone number:    Ordering Provider:      Preferred Method of Contact: Phone Call  Patient's Preferred Phone Number on File: [phone]   Best Call Back Number, if different:  Additional Information: Patient is requesting a refill on medication.

## 2025-05-21 NOTE — PROGRESS NOTES
[facility]  Nephrology Clinic    Chief complaint: Chronic Kidney Disease (Follow up)    History of present illness:   Fabiola Mejia is a 74 y.o. Black or  female who presents to Nephrology clinic today for management of chronic kidney disease. The patient has pertinent chronic conditions that includenon-insulin dependent diabetes mellitus type 2 diagnosed greater than 20 years ago, hypertension, fibromyalgia, dyslipidemia, osteopenia, and obesity.       Review of Systems  12 point review of systems conducted, negative except as stated in the history of present illness.    Allergies: Patient is allergic to codeine, gabapentin, hydroxychloroquine, methocarbamol, acetaminophen-codeine, and tramadol.     Past Medical History:  has a past medical history of Connective tissue disorder, Degenerative arthritis of knee, bilateral, Disorder of rotator cuff, Effusion of knee, Fibromyalgia, Osteopenia, and Strain of flexor muscle of hip.    Procedure History:  has a past surgical history that includes Tonsillectomy.    Family History: family history includes Diabetes in her mother; Heart disease in her mother; Hypertension in her mother; No Known Problems in her father.    Social History:  reports that she has never smoked. She has never used smokeless tobacco. She reports that she does not drink alcohol and does not use drugs.    Physical exam  There were no vitals taken for this visit.  General appearance: Patient is in no acute distress.  Skin: No rashes or wounds.  HEENT: PERRLA, EOMI, no scleral icterus, no JVD. Neck is supple.  Chest: Respirations are unlabored. Lungs sounds are clear.   Heart: S1, S2.   Abdomen: Benign.  : Deferred.  Extremities: No edema, peripheral pulses are palpable.   Neuro: No focal deficits.     Home Medications:  Current Medications[1]    Laboratory data    Lab Results   Component Value Date    WBC 4.74 04/23/2025    HGB 14.2 04/23/2025    HCT 46.9  04/23/2025     04/23/2025     (H) 05/14/2025    K 5.0 05/14/2025    CO2 29 05/14/2025    BUN 34.9 (H) 05/14/2025    CREATININE 1.21 (H) 05/14/2025    EGFRNORACEVR 47 05/14/2025    CALCIUM 9.4 05/14/2025    ALKPHOS 65 05/14/2025    ALBUMIN 3.4 05/14/2025    BILIDIR 0.3 02/21/2023    IBILI 0.40 02/21/2023    AST 14 05/14/2025    ALT 17 05/14/2025      Lab Results   Component Value Date    HGBA1C 11.0 (H) 04/23/2025    .8 (H) 10/18/2024    VGDMIKUG59DZ 54.5 02/29/2024    HEPCAB Nonreactive 07/22/2021     Urine:  Lab Results   Component Value Date    APPEARANCEUA Clear 05/14/2025    SGUA 1.020 05/14/2025    PROTEINUA Negative 05/14/2025    KETONESUA Negative 05/14/2025    LEUKOCYTESUR Negative 05/14/2025    RBCUA None Seen 10/18/2024    WBCUA None Seen 10/18/2024    BACTERIA Rare 10/18/2024    SQEPUA Occ (A) 04/22/2024    HYALINECASTS None Seen 04/22/2024    CREATRANDUR 122.7 (H) 05/14/2025    PROTEINURINE 12.4 05/14/2025    UPROTCREA 0.1 05/14/2025         Imaging  Complete abdominal ultrasound 11/29/2023  Liver:  Size: 17.9 cm in the right midclavicular line, normal  Appearance: There is increased echogenicity of the liver parenchyma increased echogenicity decreases sensitivity to detect focal lesions  Mass: No focal masses  Gallbladder:  Stones/Sludge: None  Appearance: No wall thickening, pericholecystic fluid or hydrops  Sonographic Pan's Sign: Negative  Bile Ducts:  Intrahepatic Ducts: No dilatation  Extrahepatic Ducts: Common bile duct measures 0.20 cm, no dilatation  Pancreas:  Visualized portions of the pancreas are normal.  Both kidneys are of normal size shape and contour with no abnormal masses or hydronephrosis.  Subcentimeter cyst identified in the right kidney measuring 7 mm x 8 mm x 7 mm  Spleen is unremarkable  Vessels:  Aorta: Visualized portions are normal.  Inferior Vena Cava: Visualized portions are normal.  Main Portal Vein: Patent with hepatopedal  flow.  Free Fluid:  No  ascites or pleural effusions     Impression:  Limited exam due to patient's body habitus.  Hepatic steatosis.  Cyst of the right kidney    Impression    ICD-10-CM ICD-9-CM   1. CKD stage G3a/A1, GFR 45-59 and albumin creatinine ratio <30 mg/g  N18.31 585.3   2. Type 2 diabetes mellitus with stage 3a chronic kidney disease, without long-term current use of insulin  E11.22 250.40    N18.31 585.3   3. Primary hypertension  I10 401.9   4. Severe obesity (BMI >= 40)  E66.01 278.01        Plan  CKD stage G3a/A1, GFR 45-59 and albumin creatinine ratio <30 mg/g  Diabetic kidney disease.  Imaging of the kidneys was unremarkable.  There is no significant proteinuria.  Continue risk factor management and SGLT2i.  MRA was discontinued due to acute kidney injury and hyperkalemia.   Diabetes is very poorly controlled, most recent A1c (04/23/2025) was 11%.  Importance adhering to lower carbohydrate diet discussed with the patient at length.  She was advised to follow up with the primary care provider for medication adjustment.    Type 2 diabetes mellitus with stage 3a chronic kidney disease, without long-term current use of insulin  Diabetes is very poorly controlled, most recent A1c (04/23/2025) was 11%.  Importance adhering to lower carbohydrate diet discussed with the patient at length.  She was advised to follow up with the primary care provider for medication adjustment.    Primary hypertension  Blood pressure reading is at goal, continue current antihypertensive regimen and 2 g a day dietary sodium restriction.      Severe obesity (BMI >= 40)  Lifestyle and dietary interventions discussed, patient encouraged to maintain non-sedentary lifestyle and well-balanced diet.     Follow up in about 6 months (around 11/21/2025).     Orders Placed This Encounter   Procedures    Comprehensive Metabolic Panel     Standing Status:   Future     Expected Date:   11/11/2025     Expiration Date:   12/10/2025    Microalbumin/Creatinine Ratio,  Urine     Standing Status:   Future     Expected Date:   11/11/2025     Expiration Date:   12/10/2025     Specimen Source:   Urine    Urinalysis, Reflex to Urine Culture     Standing Status:   Future     Expected Date:   11/11/2025     Expiration Date:   12/10/2025     Specimen Source:   Urine        Shikha Ramirez NP  [facility] Nephrology            [1]   Current Outpatient Medications:     ACCU-CHEK GUIDE ME GLUCOSE MTR Misc, 4 (four) times daily., Disp: , Rfl:     acetaminophen (TYLENOL) 650 MG TbSR, Take 650 mg by mouth every 8 (eight) hours., Disp: , Rfl:     berberine chloride 500 mg Cap, Take 2 capsules by mouth once daily., Disp: 180 capsule, Rfl: 2    blood pressure monitor Kit, 1 each by Misc.(Non-Drug; Combo Route) route once daily., Disp: 1 each, Rfl: 0    blood sugar diagnostic Strp, To check BG 1 times daily, to use with insurance preferred meter, Disp: 100 strip, Rfl: 11    blood sugar diagnostic Strp, To check BG four times daily, to use with insurance preferred meter, Disp: 120 each, Rfl: 11    blood-glucose meter kit, To check BG 1 times daily, to use with insurance preferred meter, Disp: 1 each, Rfl: 0    blood-glucose meter kit, To check BG four times daily, to use with insurance preferred meter, Disp: 1 each, Rfl: 0    clobetasoL (TEMOVATE) 0.05 % cream, , Disp: , Rfl:     dapagliflozin propanediol (FARXIGA) 10 mg tablet, Take 1 tablet (10 mg total) by mouth once daily., Disp: 90 tablet, Rfl: 3    desonide (DESOWEN) 0.05 % cream, , Disp: , Rfl:     ezetimibe (ZETIA) 10 mg tablet, Take 1 tablet (10 mg total) by mouth once daily., Disp: 90 tablet, Rfl: 3    glipiZIDE (GLUCOTROL) 5 MG tablet, Take 1 tab po daily., Disp: 90 tablet, Rfl: 1    hydroCHLOROthiazide 12.5 MG Tab, Take 1 tablet (12.5 mg total) by mouth once daily. for 7 days, Disp: 7 tablet, Rfl: 0    lancets Misc, To check BG 1 times daily, to use with insurance preferred meter, Disp: 100 each, Rfl: 11    lancets Misc, To check BG four times  daily, to use with insurance preferred meter, Disp: 120 each, Rfl: 11    multivitamin (THERAGRAN) per tablet, Take 1 tablet by mouth once daily., Disp: , Rfl:     mupirocin (BACTROBAN) 2 % ointment, Apply topically 2 (two) times daily. Apply to wound on left thigh, Disp: 15 g, Rfl: 2    ONETOUCH DELICA PLUS LANCET 33 gauge Misc, Apply topically., Disp: , Rfl:     oxybutynin (DITROPAN-XL) 5 MG TR24, Take 1 tablet (5 mg total) by mouth once daily., Disp: 90 tablet, Rfl: 3    pantoprazole (PROTONIX) 40 MG tablet, Take 40 mg by mouth once daily., Disp: , Rfl:     pectin/vit B6/mins 4/c.vinegar (APPLE CIDER VINEGAR COMPLEX ORAL), Take 1 capsule by mouth Daily., Disp: , Rfl:     pioglitazone (ACTOS) 15 MG tablet, Take 1 tab po daily., Disp: 90 tablet, Rfl: 1    pravastatin (PRAVACHOL) 40 MG tablet, Take 1 tab po Q hs., Disp: 90 tablet, Rfl: 1    pravastatin (PRAVACHOL) 80 MG tablet, Take 1 tablet (80 mg total) by mouth once daily., Disp: 90 tablet, Rfl: 3    semaglutide (OZEMPIC) 0.25 mg or 0.5 mg (2 mg/3 mL) pen injector, Inject 0.5 mg into the skin every 7 days., Disp: 9 mL, Rfl: 2    UNABLE TO FIND, Take 1 capsule by mouth once daily. medication name: tumeric curcumin with adrian powder, Disp: , Rfl:     UNABLE TO FIND, Take 850 mg by mouth once daily. medication name: hyaluronic acid, Disp: , Rfl:     VITAMIN A ORAL, Take 2,400 mcg by mouth Daily., Disp: , Rfl:     vitamin E 400 UNIT capsule, Take 400 Units by mouth once daily., Disp: , Rfl:

## 2025-05-21 NOTE — PROGRESS NOTES
" [facility]  Nephrology Clinic    Chief complaint: Chronic Kidney Disease (Follow up)    History of present illness:   Fabiola Mejia is a 74 y.o. Black or  female who presents to Nephrology clinic today for management of chronic kidney disease. The patient has pertinent chronic conditions that includenon-insulin dependent diabetes mellitus type 2 diagnosed greater than 20 years ago, hypertension, fibromyalgia, dyslipidemia, osteopenia, and obesity.  Complains of bilateral lower extremity edema and pain.      Review of Systems  12 point review of systems conducted, negative except as stated in the history of present illness.    Allergies: Patient is allergic to codeine, gabapentin, hydroxychloroquine, methocarbamol, acetaminophen-codeine, and tramadol.     Past Medical History:  has a past medical history of Connective tissue disorder, Degenerative arthritis of knee, bilateral, Disorder of rotator cuff, Effusion of knee, Fibromyalgia, Osteopenia, and Strain of flexor muscle of hip.    Procedure History:  has a past surgical history that includes Tonsillectomy.    Family History: family history includes Diabetes in her mother; Heart disease in her mother; Hypertension in her mother; No Known Problems in her father.    Social History:  reports that she has never smoked. She has never used smokeless tobacco. She reports that she does not drink alcohol and does not use drugs.    Physical exam  /84 (BP Location: Left arm, Patient Position: Sitting)   Pulse 87   Temp 97.7 °F (36.5 °C) (Oral)   Resp 18   Ht 5' 3" (1.6 m)   Wt 115.2 kg (254 lb)   SpO2 100%   BMI 44.99 kg/m²   General appearance: Patient is in no acute distress.  Skin: No rashes or wounds.  HEENT: PERRLA, EOMI, no scleral icterus, no JVD. Neck is supple.  Chest: Respirations are unlabored. Lungs sounds are clear.   Heart: S1, S2.   Abdomen: Benign.  : Deferred.  Extremities:  Bilateral lower " extremity pitting 2+ edema, peripheral pulses are palpable.   Neuro: No focal deficits.     Home Medications:  Current Medications[1]    Laboratory data    Lab Results   Component Value Date    WBC 4.74 04/23/2025    HGB 14.2 04/23/2025    HCT 46.9 04/23/2025     04/23/2025     (H) 05/14/2025    K 5.0 05/14/2025    CO2 29 05/14/2025    BUN 34.9 (H) 05/14/2025    CREATININE 1.21 (H) 05/14/2025    EGFRNORACEVR 47 05/14/2025    CALCIUM 9.4 05/14/2025    ALKPHOS 65 05/14/2025    ALBUMIN 3.4 05/14/2025    BILIDIR 0.3 02/21/2023    IBILI 0.40 02/21/2023    AST 14 05/14/2025    ALT 17 05/14/2025      Lab Results   Component Value Date    HGBA1C 11.0 (H) 04/23/2025    .8 (H) 10/18/2024    [id number] 54.5 02/29/2024    HEPCAB Nonreactive 07/22/2021     Urine:  Lab Results   Component Value Date    APPEARANCEUA Clear 05/14/2025    SGUA 1.020 05/14/2025    PROTEINUA Negative 05/14/2025    KETONESUA Negative 05/14/2025    LEUKOCYTESUR Negative 05/14/2025    RBCUA None Seen 10/18/2024    WBCUA None Seen 10/18/2024    BACTERIA Rare 10/18/2024    SQEPUA Occ (A) 04/22/2024    HYALINECASTS None Seen 04/22/2024    CREATRANDUR 122.7 (H) 05/14/2025    PROTEINURINE 12.4 05/14/2025    UPROTCREA 0.1 05/14/2025         Imaging  Complete abdominal ultrasound 11/29/2023  Liver:  Size: 17.9 cm in the right midclavicular line, normal  Appearance: There is increased echogenicity of the liver parenchyma increased echogenicity decreases sensitivity to detect focal lesions  Mass: No focal masses  Gallbladder:  Stones/Sludge: None  Appearance: No wall thickening, pericholecystic fluid or hydrops  Sonographic Pan's Sign: Negative  Bile Ducts:  Intrahepatic Ducts: No dilatation  Extrahepatic Ducts: Common bile duct measures 0.20 cm, no dilatation  Pancreas:  Visualized portions of the pancreas are normal.  Both kidneys are of normal size shape and contour with no abnormal masses or hydronephrosis.  Subcentimeter cyst  identified in the right kidney measuring 7 mm x 8 mm x 7 mm  Spleen is unremarkable  Vessels:  Aorta: Visualized portions are normal.  Inferior Vena Cava: Visualized portions are normal.  Main Portal Vein: Patent with hepatopedal  flow.  Free Fluid:  No ascites or pleural effusions     Impression:  Limited exam due to patient's body habitus.  Hepatic steatosis.  Cyst of the right kidney    Impression    ICD-10-CM ICD-9-CM   1. CKD stage G3a/A1, GFR 45-59 and albumin creatinine ratio <30 mg/g  N18.31 585.3   2. Type 2 diabetes mellitus with stage 3a chronic kidney disease, without long-term current use of insulin  E11.22 250.40    N18.31 585.3   3. Primary hypertension  I10 401.9   4. Severe obesity (BMI >= 40)  E66.01 278.01        Plan  CKD stage G3a/A1, GFR 45-59 and albumin creatinine ratio <30 mg/g  Diabetic kidney disease.  Imaging of the kidneys was unremarkable.  There is no significant proteinuria.  Continue risk factor management and SGLT2i.  MRA was discontinued due to acute kidney injury and hyperkalemia.   Diabetes is very poorly controlled, most recent A1c (04/23/2025) was 11%.  Importance adhering to lower carbohydrate diet discussed with the patient at length.  She was advised to follow up with the primary care provider for medication adjustment.    Type 2 diabetes mellitus with stage 3a chronic kidney disease, without long-term current use of insulin  Diabetes is very poorly controlled, most recent A1c (04/23/2025) was 11%.  Importance adhering to lower carbohydrate diet discussed with the patient at length.  She was advised to follow up with the primary care provider for medication adjustment.    Primary hypertension  Blood pressure reading is at goal, continue current antihypertensive regimen and 2 g a day dietary sodium restriction.  Will increase hydrochlorothiazide to 25 mg a day as patient is edematous.      Severe obesity (BMI >= 40)  Lifestyle and dietary interventions discussed, patient  encouraged to maintain non-sedentary lifestyle and well-balanced diet.     Follow up in about 6 months (around 11/21/2025).     Orders Placed This Encounter   Procedures    Comprehensive Metabolic Panel     Standing Status:   Future     Expected Date:   11/11/2025     Expiration Date:   12/10/2025    Microalbumin/Creatinine Ratio, Urine     Standing Status:   Future     Expected Date:   11/11/2025     Expiration Date:   12/10/2025     Specimen Source:   Urine    Urinalysis, Reflex to Urine Culture     Standing Status:   Future     Expected Date:   11/11/2025     Expiration Date:   12/10/2025     Specimen Source:   Urine        Shikha Ramirez NP  [facility] Nephrology            [1]   Current Outpatient Medications:     ACCU-CHEK GUIDE ME GLUCOSE MTR Misc, 4 (four) times daily., Disp: , Rfl:     acetaminophen (TYLENOL) 650 MG TbSR, Take 650 mg by mouth every 8 (eight) hours., Disp: , Rfl:     berberine chloride 500 mg Cap, Take 2 capsules by mouth once daily., Disp: 180 capsule, Rfl: 2    blood pressure monitor Kit, 1 each by Misc.(Non-Drug; Combo Route) route once daily., Disp: 1 each, Rfl: 0    blood sugar diagnostic Strp, To check BG 1 times daily, to use with insurance preferred meter, Disp: 100 strip, Rfl: 11    blood sugar diagnostic Strp, To check BG four times daily, to use with insurance preferred meter, Disp: 120 each, Rfl: 11    blood-glucose meter kit, To check BG 1 times daily, to use with insurance preferred meter, Disp: 1 each, Rfl: 0    blood-glucose meter kit, To check BG four times daily, to use with insurance preferred meter, Disp: 1 each, Rfl: 0    dapagliflozin propanediol (FARXIGA) 10 mg tablet, Take 1 tablet (10 mg total) by mouth once daily., Disp: 90 tablet, Rfl: 3    ezetimibe (ZETIA) 10 mg tablet, Take 1 tablet (10 mg total) by mouth once daily., Disp: 90 tablet, Rfl: 3    glipiZIDE (GLUCOTROL) 5 MG tablet, Take 1 tab po daily., Disp: 90 tablet, Rfl: 1    lancets Misc, To check BG 1 times  daily, to use with insurance preferred meter, Disp: 100 each, Rfl: 11    lancets Misc, To check BG four times daily, to use with insurance preferred meter, Disp: 120 each, Rfl: 11    multivitamin (THERAGRAN) per tablet, Take 1 tablet by mouth once daily., Disp: , Rfl:     ONETOUCH DELICA PLUS LANCET 33 gauge Misc, Apply topically., Disp: , Rfl:     oxybutynin (DITROPAN-XL) 5 MG TR24, Take 1 tablet (5 mg total) by mouth once daily., Disp: 90 tablet, Rfl: 3    pantoprazole (PROTONIX) 40 MG tablet, Take 40 mg by mouth daily as needed., Disp: , Rfl:     pectin/vit B6/mins 4/c.vinegar (APPLE CIDER VINEGAR COMPLEX ORAL), Take 1 capsule by mouth Daily., Disp: , Rfl:     pioglitazone (ACTOS) 15 MG tablet, Take 1 tab po daily., Disp: 90 tablet, Rfl: 1    pravastatin (PRAVACHOL) 80 MG tablet, Take 1 tablet (80 mg total) by mouth once daily., Disp: 90 tablet, Rfl: 3    semaglutide (OZEMPIC) 0.25 mg or 0.5 mg (2 mg/3 mL) pen injector, Inject 0.5 mg into the skin every 7 days., Disp: 9 mL, Rfl: 2    UNABLE TO FIND, Take 1 capsule by mouth once daily. medication name: tumeric curcumin with ginger powder, Disp: , Rfl:     UNABLE TO FIND, Take 850 mg by mouth once daily. medication name: hyaluronic acid, Disp: , Rfl:     VITAMIN A ORAL, Take 2,400 mcg by mouth Daily., Disp: , Rfl:     vitamin E 400 UNIT capsule, Take 400 Units by mouth once daily., Disp: , Rfl:     clobetasoL (TEMOVATE) 0.05 % cream, , Disp: , Rfl:     desonide (DESOWEN) 0.05 % cream, , Disp: , Rfl:     hydroCHLOROthiazide (HYDRODIURIL) 25 MG tablet, Take 1 tablet (25 mg total) by mouth once daily., Disp: 90 tablet, Rfl: 3    mupirocin (BACTROBAN) 2 % ointment, Apply topically 2 (two) times daily. Apply to wound on left thigh (Patient not taking: Reported on 5/21/2025), Disp: 15 g, Rfl: 2    pravastatin (PRAVACHOL) 40 MG tablet, Take 1 tab po Q hs. (Patient not taking: Reported on 5/21/2025), Disp: 90 tablet, Rfl: 1

## 2025-05-21 NOTE — TELEPHONE ENCOUNTER
Pharmacy had Rf ozempic - increased dose on hold - they will order. Insurance pays. Left  for summer

## 2025-05-23 DIAGNOSIS — N18.30 CONTROLLED TYPE 2 DIABETES MELLITUS WITH STAGE 3 CHRONIC KIDNEY DISEASE, WITHOUT LONG-TERM CURRENT USE OF INSULIN: ICD-10-CM

## 2025-05-23 DIAGNOSIS — E11.22 CONTROLLED TYPE 2 DIABETES MELLITUS WITH STAGE 3 CHRONIC KIDNEY DISEASE, WITHOUT LONG-TERM CURRENT USE OF INSULIN: ICD-10-CM

## 2025-05-23 RX ORDER — GLIPIZIDE 5 MG/1
TABLET ORAL
Qty: 90 TABLET | Refills: 1 | Status: SHIPPED | OUTPATIENT
Start: 2025-05-23

## 2025-05-29 ENCOUNTER — TELEPHONE (OUTPATIENT)
Dept: FAMILY MEDICINE | Facility: CLINIC | Age: 75
End: 2025-05-29
Payer: MEDICARE

## 2025-05-29 DIAGNOSIS — E78.2 MIXED HYPERLIPIDEMIA: ICD-10-CM

## 2025-05-29 RX ORDER — PRAVASTATIN SODIUM 40 MG/1
TABLET ORAL
Qty: 90 TABLET | Refills: 1 | Status: SHIPPED | OUTPATIENT
Start: 2025-05-29

## 2025-05-29 NOTE — TELEPHONE ENCOUNTER
Copied from CRM #[id number]. Topic: Medications - Medication Refill  >> May 29, 2025 11:12 AM Tressa wrote:  Who Called: Fabiola Mejia    Refill or New Rx:Refill  RX Name and Strength: pravastatin (PRAVACHOL) 40 MG tablet  How is the patient currently taking it? (ex. 1XDay):  Is this a 30 day or 90 day RX:  Local or Mail Order:  List of preferred pharmacies on file (remove unneeded):  [facility] HOME DELIVERY - [address]  Ordering Provider:      Preferred Method of Contact: Phone Call  Patient's Preferred Phone Number on File: [phone]   Best Call Back Number, if different:  Additional Information:

## 2025-06-02 ENCOUNTER — OFFICE VISIT (OUTPATIENT)
Dept: FAMILY MEDICINE | Facility: CLINIC | Age: 75
End: 2025-06-02
Payer: MEDICARE

## 2025-06-02 VITALS
SYSTOLIC BLOOD PRESSURE: 100 MMHG | TEMPERATURE: 99 F | RESPIRATION RATE: 18 BRPM | DIASTOLIC BLOOD PRESSURE: 68 MMHG | OXYGEN SATURATION: 94 % | HEART RATE: 96 BPM | BODY MASS INDEX: 44.21 KG/M2 | WEIGHT: 249.63 LBS

## 2025-06-02 DIAGNOSIS — I10 PRIMARY HYPERTENSION: ICD-10-CM

## 2025-06-02 DIAGNOSIS — N18.32 TYPE 2 DIABETES MELLITUS WITH STAGE 3B CHRONIC KIDNEY DISEASE, WITHOUT LONG-TERM CURRENT USE OF INSULIN: Primary | ICD-10-CM

## 2025-06-02 DIAGNOSIS — E11.22 TYPE 2 DIABETES MELLITUS WITH STAGE 3B CHRONIC KIDNEY DISEASE, WITHOUT LONG-TERM CURRENT USE OF INSULIN: Primary | ICD-10-CM

## 2025-06-02 PROCEDURE — 1160F RVW MEDS BY RX/DR IN RCRD: CPT | Mod: CPTII,,,

## 2025-06-02 PROCEDURE — 3078F DIAST BP <80 MM HG: CPT | Mod: CPTII,,,

## 2025-06-02 PROCEDURE — 3288F FALL RISK ASSESSMENT DOCD: CPT | Mod: CPTII,,,

## 2025-06-02 PROCEDURE — 1101F PT FALLS ASSESS-DOCD LE1/YR: CPT | Mod: CPTII,,,

## 2025-06-02 PROCEDURE — 99214 OFFICE O/P EST MOD 30 MIN: CPT | Mod: ,,,

## 2025-06-02 PROCEDURE — 1126F AMNT PAIN NOTED NONE PRSNT: CPT | Mod: CPTII,,,

## 2025-06-02 PROCEDURE — 1159F MED LIST DOCD IN RCRD: CPT | Mod: CPTII,,,

## 2025-06-02 PROCEDURE — 3074F SYST BP LT 130 MM HG: CPT | Mod: CPTII,,,

## 2025-06-02 PROCEDURE — 3046F HEMOGLOBIN A1C LEVEL >9.0%: CPT | Mod: CPTII,,,

## 2025-06-02 RX ORDER — SEMAGLUTIDE 1.34 MG/ML
1 INJECTION, SOLUTION SUBCUTANEOUS
Qty: 3 ML | Refills: 11 | Status: SHIPPED | OUTPATIENT
Start: 2025-06-02 | End: 2026-06-02

## 2025-06-04 ENCOUNTER — OFFICE VISIT (OUTPATIENT)
Dept: ORTHOPEDICS | Facility: CLINIC | Age: 75
End: 2025-06-04
Payer: MEDICARE

## 2025-06-04 VITALS
HEIGHT: 63 IN | DIASTOLIC BLOOD PRESSURE: 80 MMHG | SYSTOLIC BLOOD PRESSURE: 131 MMHG | WEIGHT: 249.56 LBS | HEART RATE: 90 BPM | BODY MASS INDEX: 44.22 KG/M2

## 2025-06-04 DIAGNOSIS — M17.0 PRIMARY OSTEOARTHRITIS OF BOTH KNEES: Primary | ICD-10-CM

## 2025-06-04 PROCEDURE — 3075F SYST BP GE 130 - 139MM HG: CPT | Mod: CPTII,,, | Performed by: ORTHOPAEDIC SURGERY

## 2025-06-04 PROCEDURE — 1101F PT FALLS ASSESS-DOCD LE1/YR: CPT | Mod: CPTII,,, | Performed by: ORTHOPAEDIC SURGERY

## 2025-06-04 PROCEDURE — 3046F HEMOGLOBIN A1C LEVEL >9.0%: CPT | Mod: CPTII,,, | Performed by: ORTHOPAEDIC SURGERY

## 2025-06-04 PROCEDURE — 20610 DRAIN/INJ JOINT/BURSA W/O US: CPT | Mod: 50,,, | Performed by: ORTHOPAEDIC SURGERY

## 2025-06-04 PROCEDURE — 3288F FALL RISK ASSESSMENT DOCD: CPT | Mod: CPTII,,, | Performed by: ORTHOPAEDIC SURGERY

## 2025-06-04 PROCEDURE — 99214 OFFICE O/P EST MOD 30 MIN: CPT | Mod: 25,,, | Performed by: ORTHOPAEDIC SURGERY

## 2025-06-04 PROCEDURE — 1159F MED LIST DOCD IN RCRD: CPT | Mod: CPTII,,, | Performed by: ORTHOPAEDIC SURGERY

## 2025-06-04 PROCEDURE — 3079F DIAST BP 80-89 MM HG: CPT | Mod: CPTII,,, | Performed by: ORTHOPAEDIC SURGERY

## 2025-06-04 RX ORDER — LIDOCAINE HYDROCHLORIDE 20 MG/ML
2 INJECTION, SOLUTION INFILTRATION; PERINEURAL
Status: DISCONTINUED | OUTPATIENT
Start: 2025-06-04 | End: 2025-06-04 | Stop reason: HOSPADM

## 2025-06-04 RX ADMIN — LIDOCAINE HYDROCHLORIDE 2 ML: 20 INJECTION, SOLUTION INFILTRATION; PERINEURAL at 08:06

## 2025-06-13 NOTE — PROGRESS NOTES
"  Patient ID: [id number]     Chief Complaint: New Patient (Rates pain Hands, back, legs 8/10.  )      Referred By: Dr. Hanna Gupta     HPI:     Fabiola Mejia is a 74 y.o. female here today for a new patient visit a history +MANOJ/Connective tissue disorder.      Presents today for evaluation of previous reports of Connective tissue disorder. However do not have these records. She was previously seen by Dr Barrientos and diagnosed with connective tissue disease- last seen in December of 2021- she was started on Plaquenil but reports never worked- did take for "a good while", last OV note appears MTX was started-thinks she did take but denies much relief. She can recall taking Mobic in the past and states this helped her overall the best but was d/c due to kidney functions- states she felt "great" when taking.  She reports she never followed up with Rheumatology as the medication never helped. Appears she was also seen by Kaitlyn Rojas NP (Rheum NP in 11/2019) with no concerns for inflammatory arthritis, more OA of knees.  She states joint pain to bilateral knees has been present for years- has received VSI last month but has not been helpful. She does follow with Ortho. She also reports pain in her hands for the past few years. She use to get shots for trigger fingers in the past and no longer has issues with this, will still have occ cramp but now in different fingers- otherwise denies joint pain in hands. She does have some swelling but to bilateral lower extremities- denies in joints- she was put on a fluid pill and states has helped. She has been limited with activity yfn walking due to on going knee pain- she does walk with a cane/walker. She denies red/warm/swollen joints, morning stiffness lasting about 5 -10 minutes some times 30 minutes, some days all day. She reports joints pain is worse with movement, better with rest/sitting- she has tried PT in the past but reports made pain worse so had to stop. She will take " "Tylenol Arthritis and this does help some, occ BC Powder- does not use often. She uses Icy hot and helps at times as well. She does report having a rash off and on that occurs on her right buttocks- has OTC steroid cream and resolves when uses- never seen by Dermatology. She does have some balding, states this started years back- reports hereditary, states everyone in her family has lost their hair.     PMH: CKD, OA, T2DM with stage III CKD, fibromyalgia, HLD, HTN, osteopenia    Dr. Ghotra- Ortho  Dr. Walker- Podiatry- [facility]  Dr. Mcpherson- Ophthalmology  KIERSTEN Ramirez, NP- Nephrology    Denies history of fevers, rashes, photosensitivity, oral or nasal ulcers, h/o MI, stroke, seizures, h/o PE or DVT, Raynaud's phenomenon, uveitis, malignancies.   Family history of autoimmune disease: None  Pregnancies:0 Miscarriages: None  Smoking: Start age of 17 years old, quit age 39, history of 1 ppd    -------------------------------------    Connective tissue disorder    Degenerative arthritis of knee, bilateral    Disorder of rotator cuff    Effusion of knee    Fibromyalgia    Osteopenia    Strain of flexor muscle of hip        Past Surgical History:   Procedure Laterality Date    TONSILLECTOMY         Review of patient's allergies indicates:   Allergen Reactions    Codeine Other (See Comments)    Gabapentin     Hydroxychloroquine      Other Reaction(s): Loose bowel movement    Methocarbamol      Other Reaction(s): makes her sleepy and feel "funny"    Acetaminophen-codeine Other (See Comments)    Tramadol Rash       Current Outpatient Medications   Medication Instructions    ACCU-CHEK GUIDE ME GLUCOSE MTR Misc 4 times daily    acetaminophen (TYLENOL) 650 mg, Every 8 hours    berberine chloride 500 mg Cap 2 capsules, Oral, Daily    blood pressure monitor Kit 1 each, Misc.(Non-Drug; Combo Route), Daily    blood sugar diagnostic Strp To check BG 1 times daily, to use with insurance preferred meter    blood sugar diagnostic " Strp To check BG four times daily, to use with insurance preferred meter    blood-glucose meter kit To check BG 1 times daily, to use with insurance preferred meter    blood-glucose meter kit To check BG four times daily, to use with insurance preferred meter    clobetasoL (TEMOVATE) 0.05 % cream     dapagliflozin propanediol (FARXIGA) 10 mg, Oral, Daily    desonide (DESOWEN) 0.05 % cream     ezetimibe (ZETIA) 10 mg, Oral, Daily    glipiZIDE (GLUCOTROL) 5 MG tablet Take 1 tab po daily.    hydroCHLOROthiazide (HYDRODIURIL) 25 mg, Oral, Daily    lancets Misc To check BG 1 times daily, to use with insurance preferred meter    lancets Misc To check BG four times daily, to use with insurance preferred meter    multivitamin (THERAGRAN) per tablet 1 tablet, Daily    mupirocin (BACTROBAN) 2 % ointment Topical (Top), 2 times daily, Apply to wound on left thigh    ONETOUCH DELICA PLUS LANCET 33 gauge Misc Apply topically.    oxybutynin (DITROPAN-XL) 5 mg, Oral, Daily    OZEMPIC 1 mg, Subcutaneous, Every 7 days    pantoprazole (PROTONIX) 40 mg, Daily PRN    pectin/vit B6/mins 4/c.vinegar (APPLE CIDER VINEGAR COMPLEX ORAL) 1 capsule, Daily    pioglitazone (ACTOS) 15 MG tablet Take 1 tab po daily.    pravastatin (PRAVACHOL) 40 MG tablet Take 1 tab po Q hs.    UNABLE TO FIND 1 capsule, Daily    UNABLE TO FIND 850 mg, Daily    VITAMIN A ORAL 2,400 mcg, Daily    vitamin E 400 Units, Daily       Social History[1]     Family History   Problem Relation Name Age of Onset    Hypertension Mother      Diabetes Mother      Heart disease Mother      No Known Problems Father          Immunization History   Administered Date(s) Administered    COVID-19, vector-nr, rS-Ad26, PF ([employer]) 03/23/2021    Influenza - Quadrivalent - High Dose - PF (65 years and older) 11/16/2022, 09/27/2023    Influenza - Quadrivalent - PF *Preferred* (6 months and older) 11/06/2017, 12/11/2018, 02/03/2020, 11/10/2021    Influenza - Trivalent - Fluad - Adjuvanted -  PF (65 years and older 02/03/2025    Influenza - Trivalent - Fluarix, Flulaval, Fluzone, Afluria - PF 12/02/2013, 10/13/2014    Pneumococcal Conjugate - 13 Valent 12/11/2018    Pneumococcal Polysaccharide - 23 Valent 01/13/2014, 02/17/2022       Patient Care Team:  Hanna Gupta MD as PCP - General (Family Medicine)  Shikha Ramirez FNP as Nurse Practitioner (Nephrology)  Mariana Whelan MD (Rheumatology)  Aries Mcpherson MD as Consulting Physician (Ophthalmology)     Subjective:     ROS    Constitutional:  Denies chills. Denies fever. Denies night sweats. Denies weight loss. Denies sleep disturbance.   Ophthalmology: Denies blurred vision. Denies diminished visual acuity. Denies dry eyes. Denies eye pain. Denies Itching and redness.   ENT: Denies decreased hearing. Denies oral ulcers. Denies epistaxis. Denies swelling of ears or throat. Denies dry mouth. Denies swollen glands. Admits hair loss-reports as being hereditary.   Endocrine: Admits diabetes. Denies thyroid Problems.   Respiratory: Denies cough. Denies shortness of breath. Denies shortness of breath with exertion. Denies hemoptysis.   Cardiovascular: Denies chest pain at rest. Denies chest pain with exertion. Denies Irregular heartbeat. Denies palpitations. Denies edema. Denies orthopnea.   Gastrointestinal: Denies abdominal pain. Denies diarrhea. Denies nausea. Denies vomiting. Denies hematemesis or hematochezia. Denies change in appetite. Denies heartburn.  Genitourinary: Denies dysuria. Denies urinary frequency. Denies urinary urgency. Denies blood in urine.  Musculoskeletal: See HPI for details  Integumentary: Admits rash- comes and goes to right buttocks. Denies Itching. Denies dry skin. Denies photosensitivity.   Peripheral Vascular: Denies Ulcers of hands and/or feet. Denies Cold extremities.   Neurologic: Denies dizziness. Denies headache. Denies difficulty speaking. Denies loss of strength. Denies numbness or tingling.   Psychiatric: Denies  "depression. Denies anxiety. Denies suicidal/homicidal ideations.      Objective:     Visit Vitals  /80 (BP Location: Right arm, Patient Position: Sitting)   Pulse 86   Temp 97.8 °F (36.6 °C) (Oral)   Resp 20   Ht 5' 3" (1.6 m)   Wt 113.9 kg (251 lb)   SpO2 98%   BMI 44.46 kg/m²       Physical Exam    General Appearance: alert, pleasant, in no acute distress. Appears female pattern hair loss noted, ambulates with cane.   Skin: Skin color, texture, turgor normal. No lesions. Very faint patch of dry skin noted to right buttocks- no plaque or slivery scaling noted.   Eyes:  extraocular movement intact (EOMI), pupils equal, round, reactive to light and accommodation, conjunctiva clear.  ENT: No oral or nasal ulcers.  Neck:  Neck supple. No adenopathy.   Lungs: CTA bilaterally without crackles, rhonchi, or wheezes.   Heart: RRR w/o murmurs.  +1 edema to BLE. 2+ DP pulse.  Abdomen: Soft, non-tender, no masses, rebound or guarding. Diastasis recti noted   Neuro: Alert, oriented, CN II-XII GI, sensory and motor innervation intact.  Musculoskeletal: No active synovitis noted to bilateral MCPs/PIPs/DIPs, FROM noted to bilateral wrists, elbows and shoulders with no pain, FROM noted to bilateral knees, no pain, no effusions noted, +crepitus noted bilaterally, no pain to bilateral ankles or MTPs, - SLE, no soft tissue tender points noted on exam.   Psych: Alert, oriented, normal eye contact.       Labs Reviewed:   7/22/2021 MANOJ 1:160 Speckled pattern (in note)     7/22/2021 MANOJ, dsDNA, SSA, SSB, centromere, Jailyn 1, Scl 70, RF, CCP, RNP 70, Fierro, U1 RNP all negative, HLAB27 negative        Rheumatology:  Lab Results   Component Value Date    HSCRP 3.00 07/22/2021    SEDRATE 35 (H) 07/22/2021        Chemistry:  Lab Results   Component Value Date     (H) 05/14/2025    K 5.0 05/14/2025    BUN 34.9 (H) 05/14/2025    CREATININE 1.21 (H) 05/14/2025    EGFRNORACEVR 47 05/14/2025    CALCIUM 9.4 05/14/2025    ALKPHOS 65 " 2025    ALBUMIN 3.4 2025    BILIDIR 0.3 2023    IBILI 0.40 2023    AST 14 2025    ALT 17 2025    GXLWVCHW86OA 54.5 2024        Hematology:  Lab Results   Component Value Date    WBC 4.74 2025    HGB 14.2 2025    HCT 46.9 2025     2025        Urine:  Lab Results   Component Value Date    APPEARANCEUA Clear 2025    SGUA 1.020 2025    PROTEINUA Negative 2025    KETONESUA Negative 2025    LEUKOCYTESUR Negative 2025    RBCUA None Seen 10/18/2024    WBCUA None Seen 10/18/2024    BACTERIA Rare 10/18/2024    SQEPUA Occ (A) 2024    HYALINECASTS None Seen 2024    CREATRANDUR 122.7 (H) 2025    PROTEINURINE 12.4 2025    UPROTCREA 0.1 2025        Lab Results   Component Value Date    PROTEINURINE 12.4 2025    CREATRANDUR 122.7 (H) 2025    UPROTCREA 0.1 2025        Imagin2024 Left and Right Knee Xray: Impression: Degenerative changes.     2025 Lumbar spine impression: Degenerative changes  T-spine impression: No acute fractures or subluxations are identified, significant degenerative changes.         Assessment:       ICD-10-CM ICD-9-CM   1. History of connective tissue disease  Z87.39 V13.89   2. Fibromyalgia  M79.7 729.1   3. Stage 3b chronic kidney disease  N18.32 585.3   4. Primary hypertension  I10 401.9   5. Type 2 diabetes mellitus with stage 3b chronic kidney disease, without long-term current use of insulin  E11.22 250.40    N18.32 585.3   6. Primary osteoarthritis of both knees  M17.0 715.16   7. Class 3 obesity  E66.813 278.01        Plan:     1. History of connective tissue disease (Primary)  Very pleasant 74-year-old female who presents today for evaluation of previous diagnosis of connective tissue disease.  She was previously seen by Rheumatology initially back in 2019 noted pain due to OA, no concerns of inflammatory arthritis at this  "time.  She later was seen by Dr. Barrientos in 2021 per his note noted a positive MANOJ 1:160 speckled pattern, all ENAs along with CCP and RF are noted to be negative however when labs reviewed in [facility] (7/2021)- MANOJ negative, unable to locate + MANOJ unless possibly completed outside- she was started on Plaquenil and then later on MTX in which she reports did not help with joint pain- most  improvement when she took Mobic in the past and was d/c due to increase in creat.  She reports ongoing bilateral knee pain for several years, she is followed by Orthopedics and was diagnosed with OA, she has received several VSI in the past with no relief.  She also reports occasional bilateral hand pain, apparently suffered with trigger finger in the past and which she had injected and overall has resolved.  She denies any red/warm/swollen joints, occasional morning stiffness lasting anywhere between 5 minutes to which she reports as all day" to her knees.  She also reports having hereditary hair loss which has been present for several years, has never followed with Dermatology.  She denies oral/nasal ulcers/LAD/fever/color changes in hands or feet. She does report a rash that comes and goes to her right buttocks that resolves with OTC topical steroids.  Today on exam, no active synovitis noted on exam, appears female pattern balding noted, very faint patch of dry skin noted to right buttocks- no plaques/scaling noted.   - No s/s of inflammatory arthritis noted- bilateral knee pain OA- discussed and reviewed the difference at length  - Will repeat MANOJ today for completeness, currently does not have any signs or symptoms of lupus or connective tissue disorder. If abnormal will see her  back. If negative, no need to follow up with me- continue to follow up with PCP and Ortho.     2. Fibromyalgia  - Diagnosed in the past, no soft tissue tender points noted on exam.    3. Stage 3b chronic kidney disease  - Stable and followed by " PCP/Nephrology    4. Primary hypertension  - Current stable and at goal, continue to follow-up with PCP, encouraged low-sodium diet    5. Type 2 diabetes mellitus with stage 3b chronic kidney disease, without long-term current use of insulin  - Last A1c April 2025 noted to be 11, currently uncontrolled, encouraged diet modifications of sugar/carb/starch intake, continue to follow-up with PCP- recent adjustments made    6. Primary osteoarthritis of both knees  - Currently followed by Orthopedic and receives IA injections, last seen last month- as mentioned above discussed the difference btw Inflammatory Arthritis and OA- verbalized understanding- enc to continue to follow up with Ortho.     7. Class 3 obesity  - Discuss importance of diet modifications for weight loss, exercise as tolerated as this will also help overall joint pain       Follow up in about 6 months (around 12/16/2025) for NP Follow Up. In addition to their scheduled follow up, the patient has also been instructed to follow up on as needed basis.     Total time spent with patient and documentation is 75 minutes. All questions were answered to patient's satisfaction and patient verbalized understanding. This includes face to face time and non-face to face time preparing to see the patient (eg, review of tests), obtaining and/or reviewing separately obtained history, documenting clinical information in the electronic or other health record, independently interpreting results and communicating results to the patient/family/caregiver, or care coordinator.        Orders Placed This Encounter   Procedures    Miscellaneous Test, Sendout MANOJ by [facility], send to [facility]     [id number]     Standing Status:   Future     Expected Date:   6/16/2025     Expiration Date:   8/16/2026     What is the name of the test you wish to order? (Note: Please provide the reference lab test code if possible. If you have any questions, call the Lab.):   MANOJ by [facility], send to [facility]     Miscellaneous Test, Sendout GREGG panel, send to [facility], Sm/RNP (or U1 RNP), SSA-52 (Ro52), SSA-60 (Ro60), SSB antibodies. [id number]     Standing Status:   Future     Expected Date:   6/16/2025     Expiration Date:   8/16/2026     What is the name of the test you wish to order? (Note: Please provide the reference lab test code if possible. If you have any questions, call the Lab.):   GREGG panel, send to [facility]    C-Reactive Protein     Standing Status:   Future     Expected Date:   6/16/2025     Expiration Date:   8/15/2026    Sedimentation rate     Standing Status:   Future     Expected Date:   6/16/2025     Expiration Date:   8/15/2026        GEN [place]                 [1]   Social History  Socioeconomic History    Marital status:    Tobacco Use    Smoking status: Never    Smokeless tobacco: Never   Substance and Sexual Activity    Alcohol use: Never    Drug use: Never    Sexual activity: Not Currently     Social Drivers of Health     Financial Resource Strain: Low Risk  (1/31/2024)    Overall Financial Resource Strain (CARDIA)     Difficulty of Paying Living Expenses: Not hard at all   Food Insecurity: No Food Insecurity (1/31/2024)    Hunger Vital Sign     Worried About Running Out of Food in the Last Year: Never true     Ran Out of Food in the Last Year: Never true   Transportation Needs: No Transportation Needs (1/31/2024)    PRAPARE - Transportation     Lack of Transportation (Medical): No     Lack of Transportation (Non-Medical): No   Physical Activity: Inactive (1/31/2024)    Exercise Vital Sign     Days of Exercise per Week: 0 days     Minutes of Exercise per Session: 0 min   Stress: No Stress Concern Present (1/31/2024)    [employer] [place] [employer] - Occupational Stress Questionnaire     Feeling of Stress : Not at all   Housing Stability: Low Risk  (1/31/2024)    Housing Stability Vital Sign     Unable to Pay for Housing in the Last Year: No     Number of Places  Lived in the Last Year: 1     Unstable Housing in the Last Year: No

## 2025-06-16 ENCOUNTER — LAB VISIT (OUTPATIENT)
Dept: LAB | Facility: HOSPITAL | Age: 75
End: 2025-06-16
Attending: NURSE PRACTITIONER
Payer: MEDICARE

## 2025-06-16 ENCOUNTER — OFFICE VISIT (OUTPATIENT)
Dept: RHEUMATOLOGY | Facility: CLINIC | Age: 75
End: 2025-06-16
Payer: MEDICARE

## 2025-06-16 VITALS
BODY MASS INDEX: 44.47 KG/M2 | OXYGEN SATURATION: 98 % | WEIGHT: 251 LBS | SYSTOLIC BLOOD PRESSURE: 130 MMHG | HEART RATE: 86 BPM | HEIGHT: 63 IN | DIASTOLIC BLOOD PRESSURE: 80 MMHG | TEMPERATURE: 98 F | RESPIRATION RATE: 20 BRPM

## 2025-06-16 DIAGNOSIS — E11.22 TYPE 2 DIABETES MELLITUS WITH STAGE 3B CHRONIC KIDNEY DISEASE, WITHOUT LONG-TERM CURRENT USE OF INSULIN: ICD-10-CM

## 2025-06-16 DIAGNOSIS — I10 PRIMARY HYPERTENSION: ICD-10-CM

## 2025-06-16 DIAGNOSIS — N18.32 STAGE 3B CHRONIC KIDNEY DISEASE: ICD-10-CM

## 2025-06-16 DIAGNOSIS — N18.32 TYPE 2 DIABETES MELLITUS WITH STAGE 3B CHRONIC KIDNEY DISEASE, WITHOUT LONG-TERM CURRENT USE OF INSULIN: ICD-10-CM

## 2025-06-16 DIAGNOSIS — E66.813 CLASS 3 OBESITY: ICD-10-CM

## 2025-06-16 DIAGNOSIS — M17.0 PRIMARY OSTEOARTHRITIS OF BOTH KNEES: ICD-10-CM

## 2025-06-16 DIAGNOSIS — M79.7 FIBROMYALGIA: ICD-10-CM

## 2025-06-16 DIAGNOSIS — Z87.39 HISTORY OF CONNECTIVE TISSUE DISEASE: ICD-10-CM

## 2025-06-16 DIAGNOSIS — Z87.39 HISTORY OF CONNECTIVE TISSUE DISEASE: Primary | ICD-10-CM

## 2025-06-16 LAB
CRP SERPL-MCNC: 2.3 MG/L
ERYTHROCYTE [SEDIMENTATION RATE] IN BLOOD: 31 MM/HR (ref 0–20)

## 2025-06-16 PROCEDURE — 99205 OFFICE O/P NEW HI 60 MIN: CPT | Mod: S$PBB,,, | Performed by: NURSE PRACTITIONER

## 2025-06-16 PROCEDURE — 86235 NUCLEAR ANTIGEN ANTIBODY: CPT

## 2025-06-16 PROCEDURE — 3046F HEMOGLOBIN A1C LEVEL >9.0%: CPT | Mod: CPTII,,, | Performed by: NURSE PRACTITIONER

## 2025-06-16 PROCEDURE — 1125F AMNT PAIN NOTED PAIN PRSNT: CPT | Mod: CPTII,,, | Performed by: NURSE PRACTITIONER

## 2025-06-16 PROCEDURE — 3288F FALL RISK ASSESSMENT DOCD: CPT | Mod: CPTII,,, | Performed by: NURSE PRACTITIONER

## 2025-06-16 PROCEDURE — 85652 RBC SED RATE AUTOMATED: CPT

## 2025-06-16 PROCEDURE — 36415 COLL VENOUS BLD VENIPUNCTURE: CPT

## 2025-06-16 PROCEDURE — 1101F PT FALLS ASSESS-DOCD LE1/YR: CPT | Mod: CPTII,,, | Performed by: NURSE PRACTITIONER

## 2025-06-16 PROCEDURE — 1159F MED LIST DOCD IN RCRD: CPT | Mod: CPTII,,, | Performed by: NURSE PRACTITIONER

## 2025-06-16 PROCEDURE — 99213 OFFICE O/P EST LOW 20 MIN: CPT | Mod: PBBFAC | Performed by: NURSE PRACTITIONER

## 2025-06-16 PROCEDURE — 3075F SYST BP GE 130 - 139MM HG: CPT | Mod: CPTII,,, | Performed by: NURSE PRACTITIONER

## 2025-06-16 PROCEDURE — 3079F DIAST BP 80-89 MM HG: CPT | Mod: CPTII,,, | Performed by: NURSE PRACTITIONER

## 2025-06-16 PROCEDURE — 86140 C-REACTIVE PROTEIN: CPT

## 2025-06-16 PROCEDURE — 1160F RVW MEDS BY RX/DR IN RCRD: CPT | Mod: CPTII,,, | Performed by: NURSE PRACTITIONER

## 2025-06-20 ENCOUNTER — RESULTS FOLLOW-UP (OUTPATIENT)
Dept: RHEUMATOLOGY | Facility: CLINIC | Age: 75
End: 2025-06-20
Payer: MEDICARE

## 2025-06-20 LAB
BEAKER SEE SCANNED REPORT: NORMAL
BEAKER SEE SCANNED REPORT: NORMAL

## 2025-07-03 ENCOUNTER — TELEPHONE (OUTPATIENT)
Dept: ORTHOPEDICS | Facility: CLINIC | Age: 75
End: 2025-07-03
Payer: MEDICARE

## 2025-07-03 NOTE — TELEPHONE ENCOUNTER
Patient called stating that her knees were hurting and she was requesting medication for the pain.     Attempted to call patient. No answer. Left vm.

## 2025-07-03 NOTE — TELEPHONE ENCOUNTER
Patient called stating she is having a lot of pain in the back of her knee. She also states the synvisc inj she got 6/4/25 didn't give her any relief. Patient denies trying icing. She has been elevating. Patient would like something called in for pain.

## 2025-07-03 NOTE — TELEPHONE ENCOUNTER
Called and left patient a voicemail explaining she needs to go to the ER to have her leg checked if she is having any swelling or increased pain in her calf. Also she will need to take OTC arthritis meds for knee pain.

## 2025-07-11 ENCOUNTER — TELEPHONE (OUTPATIENT)
Dept: FAMILY MEDICINE | Facility: CLINIC | Age: 75
End: 2025-07-11
Payer: MEDICARE

## 2025-07-11 NOTE — TELEPHONE ENCOUNTER
Copied from CRM #[id number]. Topic: General Inquiry - Patient Advice  >> Jul 11, 2025 11:14 AM Rishi wrote:  Who Called: Fabiola Mejia    Caller is requesting assistance/information from provider's office.    Symptoms (please be specific): blood clots   How long has patient had these symptoms:    List of preferred pharmacies on file (remove unneeded): [unfilled]  If different, enter pharmacy into here including location and phone number:       Preferred Method of Contact: Phone Call  Patient's Preferred Phone Number on File: [phone]   Best Call Back Number, if different:  Additional Information: pt called to speak with nurse thinks she might have blood clots in back of legs pls advise

## 2025-07-11 NOTE — TELEPHONE ENCOUNTER
Left VM for deacon to got o ER or urgent care if she seeks immediate care or if she would like an appointment to call us to schedule.

## 2025-07-15 ENCOUNTER — OFFICE VISIT (OUTPATIENT)
Dept: FAMILY MEDICINE | Facility: CLINIC | Age: 75
End: 2025-07-15
Payer: MEDICARE

## 2025-07-15 VITALS
OXYGEN SATURATION: 95 % | TEMPERATURE: 98 F | WEIGHT: 250.81 LBS | DIASTOLIC BLOOD PRESSURE: 83 MMHG | RESPIRATION RATE: 15 BRPM | SYSTOLIC BLOOD PRESSURE: 124 MMHG | HEART RATE: 87 BPM | BODY MASS INDEX: 44.43 KG/M2

## 2025-07-15 DIAGNOSIS — M79.89 SWELLING OF RIGHT LOWER EXTREMITY: ICD-10-CM

## 2025-07-15 DIAGNOSIS — R60.0 LOCALIZED EDEMA: ICD-10-CM

## 2025-07-15 DIAGNOSIS — Z74.09 POOR MOBILITY: Primary | ICD-10-CM

## 2025-07-15 DIAGNOSIS — M17.0 PRIMARY OSTEOARTHRITIS OF BOTH KNEES: ICD-10-CM

## 2025-07-15 PROCEDURE — 3079F DIAST BP 80-89 MM HG: CPT | Mod: CPTII,,,

## 2025-07-15 PROCEDURE — 1101F PT FALLS ASSESS-DOCD LE1/YR: CPT | Mod: CPTII,,,

## 2025-07-15 PROCEDURE — 1126F AMNT PAIN NOTED NONE PRSNT: CPT | Mod: CPTII,,,

## 2025-07-15 PROCEDURE — 3046F HEMOGLOBIN A1C LEVEL >9.0%: CPT | Mod: CPTII,,,

## 2025-07-15 PROCEDURE — 1159F MED LIST DOCD IN RCRD: CPT | Mod: CPTII,,,

## 2025-07-15 PROCEDURE — 99213 OFFICE O/P EST LOW 20 MIN: CPT | Mod: ,,,

## 2025-07-15 PROCEDURE — 3074F SYST BP LT 130 MM HG: CPT | Mod: CPTII,,,

## 2025-07-15 PROCEDURE — 3288F FALL RISK ASSESSMENT DOCD: CPT | Mod: CPTII,,,

## 2025-07-15 PROCEDURE — G2211 COMPLEX E/M VISIT ADD ON: HCPCS | Mod: ,,,

## 2025-07-15 RX ORDER — DAPAGLIFLOZIN 10 MG/1
10 TABLET, FILM COATED ORAL DAILY
Qty: 90 TABLET | Refills: 3 | Status: SHIPPED | OUTPATIENT
Start: 2025-07-15

## 2025-07-15 NOTE — ASSESSMENT & PLAN NOTE
Patient with bilateral knee and leg pain, but worse in the right side.  Her right leg is also more swollen than usual.  We will obtain stat venous Doppler ultrasound to rule out DVT.  However, this is likely her osteoarthritis.  Keep scheduled follow up with Dr. Ghotra.   Awake/Alert

## 2025-07-15 NOTE — PROGRESS NOTES
[facility]  Hanna Gupta MD    Patient ID: [id number]     Chief Complaint: Follow-up      HPI:     Fabiola Mejia is a 74 y.o. female here today for leg swelling.    Patient reports right leg swelling and pain behind the right knee and calf for the last 2 weeks.    Patient is being followed by Dr. Ghotra for bilateral knee osteoarthritis.  She had Synvisc injections 1 month ago.  Patient has not noticed much improvement yet.  She is requesting a motorized scooter as she is unable to ambulate short distances even with her walker due to knee pain.    Past Medical History:   Diagnosis Date    Connective tissue disorder     Degenerative arthritis of knee, bilateral     Disorder of rotator cuff 02/03/2025    Effusion of knee 02/03/2025    Fibromyalgia     Osteopenia     Strain of flexor muscle of hip 02/03/2025        Past Surgical History:   Procedure Laterality Date    TONSILLECTOMY          Social History     Tobacco Use    Smoking status: Never    Smokeless tobacco: Never   Substance and Sexual Activity    Alcohol use: Never    Drug use: Never    Sexual activity: Not Currently        Current Outpatient Medications   Medication Instructions    ACCU-CHEK GUIDE ME GLUCOSE MTR Misc 4 times daily    acetaminophen (TYLENOL) 650 mg, Every 8 hours    berberine chloride 500 mg Cap 2 capsules, Oral, Daily    blood pressure monitor Kit 1 each, Misc.(Non-Drug; Combo Route), Daily    blood sugar diagnostic Strp To check BG 1 times daily, to use with insurance preferred meter    blood sugar diagnostic Strp To check BG four times daily, to use with insurance preferred meter    blood-glucose meter kit To check BG 1 times daily, to use with insurance preferred meter    blood-glucose meter kit To check BG four times daily, to use with insurance preferred meter    clobetasoL (TEMOVATE) 0.05 % cream     dapagliflozin propanediol (FARXIGA) 10 mg, Oral, Daily    desonide (DESOWEN) 0.05 % cream      "ezetimibe (ZETIA) 10 mg, Oral, Daily    glipiZIDE (GLUCOTROL) 5 MG tablet Take 1 tab po daily.    hydroCHLOROthiazide (HYDRODIURIL) 25 mg, Oral, Daily    lancets Misc To check BG 1 times daily, to use with insurance preferred meter    lancets Misc To check BG four times daily, to use with insurance preferred meter    multivitamin (THERAGRAN) per tablet 1 tablet, Daily    mupirocin (BACTROBAN) 2 % ointment Topical (Top), 2 times daily, Apply to wound on left thigh    ONETOUCH DELICA PLUS LANCET 33 gauge Misc Apply topically.    oxybutynin (DITROPAN-XL) 5 mg, Oral, Daily    OZEMPIC 1 mg, Subcutaneous, Every 7 days    pantoprazole (PROTONIX) 40 mg, Daily PRN    pectin/vit B6/mins 4/c.vinegar (APPLE CIDER VINEGAR COMPLEX ORAL) 1 capsule, Daily    pioglitazone (ACTOS) 15 MG tablet Take 1 tab po daily.    pravastatin (PRAVACHOL) 40 MG tablet Take 1 tab po Q hs.    UNABLE TO FIND 1 capsule, Daily    UNABLE TO FIND 850 mg, Daily    VITAMIN A ORAL 2,400 mcg, Daily    vitamin E 400 Units, Daily       Review of patient's allergies indicates:   Allergen Reactions    Codeine Other (See Comments)    Gabapentin     Hydroxychloroquine      Other Reaction(s): Loose bowel movement    Methocarbamol      Other Reaction(s): makes her sleepy and feel "funny"    Acetaminophen-codeine Other (See Comments)    Tramadol Rash        Patient Care Team:  Hanna Gupta MD as PCP - General (Family Medicine)  Shikha Ramirez FNP as Nurse Practitioner (Nephrology)  Mariana Whelan MD (Rheumatology)  Aries Mcpherson MD as Consulting Physician (Ophthalmology)     Subjective:     Review of Systems    12 point review of systems conducted, negative except as stated in the history of present illness. See HPI for details.    Objective:     Visit Vitals  /83   Pulse 87   Temp 98 °F (36.7 °C) (Oral)   Resp 15   Wt 113.8 kg (250 lb 12.8 oz)   SpO2 95%   BMI 44.43 kg/m²       Physical Exam  Vitals and nursing note reviewed. "   Constitutional:       General: She is not in acute distress.     Appearance: She is not ill-appearing.      Comments: Slow ambulation with walker.   HENT:      Head: Normocephalic and atraumatic.   Eyes:      General: No scleral icterus.     Extraocular Movements: Extraocular movements intact.      Conjunctiva/sclera: Conjunctivae normal.   Cardiovascular:      Rate and Rhythm: Normal rate and regular rhythm.      Heart sounds: No murmur heard.     No friction rub. No gallop.   Pulmonary:      Effort: Pulmonary effort is normal. No respiratory distress.      Breath sounds: Normal breath sounds. No wheezing, rhonchi or rales.   Musculoskeletal:         General: Normal range of motion.      Right lower leg: Edema present.      Left lower leg: No edema.   Skin:     General: Skin is warm and dry.   Neurological:      General: No focal deficit present.      Mental Status: She is alert.   Psychiatric:         Mood and Affect: Mood normal.         Labs Reviewed:     Chemistry:  Lab Results   Component Value Date     (H) 05/14/2025    K 5.0 05/14/2025    BUN 34.9 (H) 05/14/2025    CREATININE 1.21 (H) 05/14/2025    EGFRNORACEVR 47 05/14/2025    CALCIUM 9.4 05/14/2025    ALKPHOS 65 05/14/2025    ALBUMIN 3.4 05/14/2025    BILIDIR 0.3 02/21/2023    IBILI 0.40 02/21/2023    AST 14 05/14/2025    ALT 17 05/14/2025    EMYPPGXX48MO 54.5 02/29/2024    TSH 2.420 11/08/2024    CWJTQD5RLUZ 0.78 02/29/2024        Lab Results   Component Value Date    HGBA1C 11.0 (H) 04/23/2025        Hematology:  Lab Results   Component Value Date    WBC 4.74 04/23/2025    HGB 14.2 04/23/2025    HCT 46.9 04/23/2025     04/23/2025       Lipid Panel:  Lab Results   Component Value Date    CHOL 206 (H) 04/23/2025    HDL 43 04/23/2025    .00 04/23/2025    TRIG 177 (H) 04/23/2025    TOTALCHOLEST 5 04/23/2025        Urine:  Lab Results   Component Value Date    APPEARANCEUA Clear 05/14/2025    SGUA 1.020 05/14/2025    PROTEINUA Negative  05/14/2025    KETONESUA Negative 05/14/2025    LEUKOCYTESUR Negative 05/14/2025    RBCUA None Seen 10/18/2024    WBCUA None Seen 10/18/2024    BACTERIA Rare 10/18/2024    SQEPUA Occ (A) 04/22/2024    HYALINECASTS None Seen 04/22/2024    CREATRANDUR 122.7 (H) 05/14/2025    PROTEINURINE 12.4 05/14/2025    UPROTCREA 0.1 05/14/2025        Assessment:       ICD-10-CM ICD-9-CM   1. Poor mobility  Z74.09 799.89   2. Swelling of right lower extremity  M79.89 729.81   3. Localized edema  R60.0 782.3   4. Primary osteoarthritis of both knees  M17.0 715.16        Plan:     1. Poor mobility  -     HME - OTHER    2. Swelling of right lower extremity  Assessment & Plan:  Patient with bilateral knee and leg pain, but worse in the right side.  Her right leg is also more swollen than usual.  We will obtain stat venous Doppler ultrasound to rule out DVT.  However, this is likely her osteoarthritis.  Keep scheduled follow up with Dr. Ghotra.    Orders:  -     Cancel: US Lower Extremity Veins Left; Future; Expected date: 07/15/2025  -     US Lower Extremity Veins Right; Future; Expected date: 07/16/2025    3. Localized edema  -     Cancel: US Lower Extremity Veins Left; Future; Expected date: 07/15/2025  -     US Lower Extremity Veins Right; Future; Expected date: 07/16/2025    4. Primary osteoarthritis of both knees  -     HME - OTHER         Fabiola Mejia has a mobility limitation that significantly impairs her ability to participate in one or more Mobility-Related Activities of Daily Living (MRADLs) such as toileting, feeding, dressing, grooming, and bathing in customary locations in the home.  Her mobility limitation cannot be sufficiently and safely resolved by using an appropriately fitted cane or walker. She does not have sufficient upper extremity function to self-propel an optimally configured manual wheelchair in the home to perform MRADLs during a typical day. Using a POV will significantly improve Herability to participate  in MRADLs, and she will use the POV in the home.  She has not expressed an unwillingness to use a POV in the home. Fabiola Mejia's mental and physical capabilities are sufficient for safe mobility using a POV in the home. Her weight is less than or equal to the weight capacity of the POV and greater than or equal to 95 percent of the weight capacity of the next lower weight class of POV. The patients home provides adequate access between rooms, maneuvering space, and surfaces for operating the POV.  She is able to safely transfer to and from a POV, operate the tiller steering system and maintain postural stability and position while operating the POV in the home.         No follow-ups on file. In addition to their scheduled follow up, the patient has also been instructed to follow up on as needed basis.     Future Appointments   Date Time Provider Department Center   8/4/2025  1:20 PM Hanna Gupta MD [facility]   9/10/2025  9:45 AM Genie Ariza PA-C [facility]   11/26/2025 10:00 AM Shikha Ramirez FNP [facility] Gupta MD

## 2025-07-16 ENCOUNTER — TELEPHONE (OUTPATIENT)
Dept: FAMILY MEDICINE | Facility: CLINIC | Age: 75
End: 2025-07-16
Payer: MEDICARE

## 2025-07-16 NOTE — TELEPHONE ENCOUNTER
Copied from CRM #[id number]. Topic: Appointments - Appointment Confirmation  >> Jul 16, 2025  8:55 AM Abelino wrote:  Who Called: Fabiola eMjia    Caller is requesting assistance/information from provider's office.    Symptoms (please be specific):    How long has patient had these symptoms:    List of preferred pharmacies on file (remove unneeded): [unfilled]  If different, enter pharmacy into here including location and phone number:       Preferred Method of Contact: Phone Call  Patient's Preferred Phone Number on File: [phone]   Best Call Back Number, if different:  Additional Information: pt called to inform provider US order should be for Right leg, not Left. Please revise , please follow up

## 2025-07-17 ENCOUNTER — TELEPHONE (OUTPATIENT)
Dept: FAMILY MEDICINE | Facility: CLINIC | Age: 75
End: 2025-07-17

## 2025-07-17 NOTE — TELEPHONE ENCOUNTER
Called [facility] scheduling and let them know. They said someone else was in her chart at the time and so they couldnt move appt.

## 2025-07-21 ENCOUNTER — TELEPHONE (OUTPATIENT)
Dept: FAMILY MEDICINE | Facility: CLINIC | Age: 75
End: 2025-07-21
Payer: MEDICARE

## 2025-07-21 ENCOUNTER — HOSPITAL ENCOUNTER (OUTPATIENT)
Dept: RADIOLOGY | Facility: HOSPITAL | Age: 75
Discharge: HOME | End: 2025-07-21
Payer: MEDICARE

## 2025-07-21 ENCOUNTER — RESULTS FOLLOW-UP (OUTPATIENT)
Dept: FAMILY MEDICINE | Facility: CLINIC | Age: 75
End: 2025-07-21
Payer: MEDICARE

## 2025-07-21 DIAGNOSIS — M79.605 BILATERAL LEG PAIN: Primary | ICD-10-CM

## 2025-07-21 DIAGNOSIS — R60.0 LOCALIZED EDEMA: ICD-10-CM

## 2025-07-21 DIAGNOSIS — M79.604 BILATERAL LEG PAIN: Primary | ICD-10-CM

## 2025-07-21 DIAGNOSIS — M79.89 SWELLING OF RIGHT LOWER EXTREMITY: ICD-10-CM

## 2025-07-21 DIAGNOSIS — I70.422 ATHEROSCLEROSIS OF AUTOLOGOUS VEIN BYPASS GRAFT OF LEFT LOWER EXTREMITY WITH REST PAIN: Primary | ICD-10-CM

## 2025-07-21 PROCEDURE — 93971 EXTREMITY STUDY: CPT | Mod: TC,RT

## 2025-07-21 NOTE — TELEPHONE ENCOUNTER
Copied from CRM #[id number]. Topic: General Inquiry - Patient Advice  >> Jul 21, 2025  7:51 AM Rishi wrote:  Who Called: Fabiola Mejia    Caller is requesting assistance/information from provider's office.    Symptoms (please be specific):    How long has patient had these symptoms:    List of preferred pharmacies on file (remove unneeded): [unfilled]  If different, enter pharmacy into here including location and phone number:       Preferred Method of Contact: Phone Call  Patient's Preferred Phone Number on File: [phone]   Best Call Back Number, if different:  Additional Information: pt called to speak with nurse wants orders for both legs goes for u/s for right leg today now stated left leg hurts too pls advse

## 2025-07-28 ENCOUNTER — TELEPHONE (OUTPATIENT)
Dept: FAMILY MEDICINE | Facility: CLINIC | Age: 75
End: 2025-07-28
Payer: MEDICARE

## 2025-07-28 NOTE — TELEPHONE ENCOUNTER
1. Are there any outstanding tasks in the patient's chart? (Ex. Labs, MMG)?  [facility]  2. Do we have any outstanding/Pending referrals?  [facility]/S - tomorrow  3. Has the patient been seen in an ER, Urgent Care or been admitted to the hospital since last office visit with our office?  No   4. Has the patient seen any other health care provider (doctors) since last visit?  Rheum, ortho  5. Has the patient had any blood work or x-rays done since last visit?  No

## 2025-07-29 ENCOUNTER — HOSPITAL ENCOUNTER (OUTPATIENT)
Dept: RADIOLOGY | Facility: HOSPITAL | Age: 75
Discharge: HOME | End: 2025-07-29
Payer: MEDICARE

## 2025-07-29 ENCOUNTER — RESULTS FOLLOW-UP (OUTPATIENT)
Dept: FAMILY MEDICINE | Facility: CLINIC | Age: 75
End: 2025-07-29
Payer: MEDICARE

## 2025-07-29 DIAGNOSIS — I70.422 ATHEROSCLEROSIS OF AUTOLOGOUS VEIN BYPASS GRAFT OF LEFT LOWER EXTREMITY WITH REST PAIN: ICD-10-CM

## 2025-07-29 DIAGNOSIS — M79.605 BILATERAL LEG PAIN: ICD-10-CM

## 2025-07-29 DIAGNOSIS — R60.0 LOCALIZED EDEMA: ICD-10-CM

## 2025-07-29 DIAGNOSIS — M79.604 BILATERAL LEG PAIN: ICD-10-CM

## 2025-07-29 PROCEDURE — 93971 EXTREMITY STUDY: CPT | Mod: TC,LT

## 2025-07-29 PROCEDURE — 93925 LOWER EXTREMITY STUDY: CPT | Mod: TC

## 2025-07-30 ENCOUNTER — RESULTS FOLLOW-UP (OUTPATIENT)
Dept: FAMILY MEDICINE | Facility: CLINIC | Age: 75
End: 2025-07-30
Payer: MEDICARE

## 2025-08-04 ENCOUNTER — OFFICE VISIT (OUTPATIENT)
Dept: FAMILY MEDICINE | Facility: CLINIC | Age: 75
End: 2025-08-04
Payer: MEDICARE

## 2025-08-04 VITALS
HEART RATE: 90 BPM | TEMPERATURE: 98 F | RESPIRATION RATE: 15 BRPM | WEIGHT: 253.63 LBS | DIASTOLIC BLOOD PRESSURE: 79 MMHG | OXYGEN SATURATION: 96 % | BODY MASS INDEX: 44.92 KG/M2 | SYSTOLIC BLOOD PRESSURE: 129 MMHG

## 2025-08-04 DIAGNOSIS — M65.30 TRIGGER FINGER, UNSPECIFIED FINGER, UNSPECIFIED LATERALITY: ICD-10-CM

## 2025-08-04 DIAGNOSIS — N18.32 TYPE 2 DIABETES MELLITUS WITH STAGE 3B CHRONIC KIDNEY DISEASE, WITHOUT LONG-TERM CURRENT USE OF INSULIN: ICD-10-CM

## 2025-08-04 DIAGNOSIS — E78.2 MIXED HYPERLIPIDEMIA: ICD-10-CM

## 2025-08-04 DIAGNOSIS — M54.6 BILATERAL THORACIC BACK PAIN, UNSPECIFIED CHRONICITY: ICD-10-CM

## 2025-08-04 DIAGNOSIS — E11.22 TYPE 2 DIABETES MELLITUS WITH STAGE 3B CHRONIC KIDNEY DISEASE, WITHOUT LONG-TERM CURRENT USE OF INSULIN: ICD-10-CM

## 2025-08-04 DIAGNOSIS — I10 PRIMARY HYPERTENSION: Primary | ICD-10-CM

## 2025-08-04 PROCEDURE — 3051F HG A1C>EQUAL 7.0%<8.0%: CPT | Mod: CPTII,,,

## 2025-08-04 PROCEDURE — 3078F DIAST BP <80 MM HG: CPT | Mod: CPTII,,,

## 2025-08-04 PROCEDURE — 20550 NJX 1 TENDON SHEATH/LIGAMENT: CPT | Mod: RT,,,

## 2025-08-04 PROCEDURE — 1159F MED LIST DOCD IN RCRD: CPT | Mod: CPTII,,,

## 2025-08-04 PROCEDURE — 99214 OFFICE O/P EST MOD 30 MIN: CPT | Mod: 25,,,

## 2025-08-04 PROCEDURE — 1160F RVW MEDS BY RX/DR IN RCRD: CPT | Mod: CPTII,,,

## 2025-08-04 PROCEDURE — 3074F SYST BP LT 130 MM HG: CPT | Mod: CPTII,,,

## 2025-08-04 RX ORDER — LIDOCAINE 50 MG/G
1 PATCH TOPICAL DAILY
Qty: 30 PATCH | Refills: 0 | Status: SHIPPED | OUTPATIENT
Start: 2025-08-04

## 2025-08-04 RX ORDER — SEMAGLUTIDE 2.68 MG/ML
2 INJECTION, SOLUTION SUBCUTANEOUS
Qty: 3 ML | Refills: 11 | Status: SHIPPED | OUTPATIENT
Start: 2025-08-04 | End: 2026-08-04

## 2025-08-04 RX ORDER — TRIAMCINOLONE ACETONIDE 40 MG/ML
40 INJECTION, SUSPENSION INTRA-ARTICULAR; INTRAMUSCULAR
Status: COMPLETED | OUTPATIENT
Start: 2025-08-04 | End: 2025-08-04

## 2025-08-04 RX ORDER — LIDOCAINE HYDROCHLORIDE 10 MG/ML
4 INJECTION, SOLUTION INFILTRATION; PERINEURAL
Status: COMPLETED | OUTPATIENT
Start: 2025-08-04 | End: 2025-08-04

## 2025-08-04 RX ADMIN — LIDOCAINE HYDROCHLORIDE 4 ML: 10 INJECTION, SOLUTION INFILTRATION; PERINEURAL at 02:08

## 2025-08-04 RX ADMIN — TRIAMCINOLONE ACETONIDE 40 MG: 40 INJECTION, SUSPENSION INTRA-ARTICULAR; INTRAMUSCULAR at 02:08

## 2025-08-04 NOTE — ASSESSMENT & PLAN NOTE
Near goal of LDL<70 with increase in pravastatin. Patient encouraged to work on diet. Continue above regimen.     Lab Results   Component Value Date    TRIG 152 (H) 07/29/2025    HDL 41 07/29/2025    TOTALCHOLEST 4 07/29/2025    LDLCALC 99.00 07/29/2025     Educated on importance of dietary modifications. Follow a low cholesterol, low saturated fat diet with less that 200mg of cholesterol a day.  Avoid fried foods and high saturated fats (high saturated fats less than 7% of calories).  Increase dietary fiber.  Regular exercise can reduce LDL and raise HDL. Stressed importance of physical activity 5 times per week for 30 minutes per day.

## 2025-08-04 NOTE — PROGRESS NOTES
[facility]  Hanna Gupta MD    Patient ID: [id number]     Chief Complaint: Follow-up (2 mth)      HPI:     Fabiola Mejia is a 74 y.o. female here today for follow up of chronic conditions.    A1c improved from 11 to 7.3. She would like to increase her Ozempic for further weight loss.     Patient reports pain and triggering of her R second finger for the last 2 weeks. Requesting a steroid injection.     Patient reports BL flank pain with certain movements and positions for months. No improvement with PT and OTC treatments including voltaren.     Past Medical History:   Diagnosis Date    Connective tissue disorder     Degenerative arthritis of knee, bilateral     Disorder of rotator cuff 02/03/2025    Effusion of knee 02/03/2025    Fibromyalgia     Osteopenia     Strain of flexor muscle of hip 02/03/2025        Past Surgical History:   Procedure Laterality Date    TONSILLECTOMY          Social History     Tobacco Use    Smoking status: Never    Smokeless tobacco: Never   Substance and Sexual Activity    Alcohol use: Never    Drug use: Never    Sexual activity: Not Currently        Current Outpatient Medications   Medication Instructions    ACCU-CHEK GUIDE ME GLUCOSE MTR Misc 4 times daily    acetaminophen (TYLENOL) 650 mg, Every 8 hours    berberine chloride 500 mg Cap 2 capsules, Oral, Daily    blood pressure monitor Kit 1 each, Misc.(Non-Drug; Combo Route), Daily    blood sugar diagnostic Strp To check BG 1 times daily, to use with insurance preferred meter    blood sugar diagnostic Strp To check BG four times daily, to use with insurance preferred meter    blood-glucose meter kit To check BG 1 times daily, to use with insurance preferred meter    blood-glucose meter kit To check BG four times daily, to use with insurance preferred meter    clobetasoL (TEMOVATE) 0.05 % cream     dapagliflozin propanediol (FARXIGA) 10 mg, Oral, Daily    desonide (DESOWEN) 0.05 % cream      "ezetimibe (ZETIA) 10 mg, Oral, Daily    glipiZIDE (GLUCOTROL) 5 MG tablet Take 1 tab po daily.    hydroCHLOROthiazide (HYDRODIURIL) 25 mg, Oral, Daily    lancets Misc To check BG 1 times daily, to use with insurance preferred meter    lancets Misc To check BG four times daily, to use with insurance preferred meter    LIDOcaine (LIDODERM) 5 % 1 patch, Transdermal, Daily, Remove & Discard patch within 12 hours or as directed by MD    multivitamin (THERAGRAN) per tablet 1 tablet, Daily    mupirocin (BACTROBAN) 2 % ointment Topical (Top), 2 times daily, Apply to wound on left thigh    ONETOUCH DELICA PLUS LANCET 33 gauge Misc Apply topically.    oxybutynin (DITROPAN-XL) 5 mg, Oral, Daily    OZEMPIC 2 mg, Subcutaneous, Every 7 days    pantoprazole (PROTONIX) 40 mg, Daily PRN    pectin/vit B6/mins 4/c.vinegar (APPLE CIDER VINEGAR COMPLEX ORAL) 1 capsule, Daily    pioglitazone (ACTOS) 15 MG tablet Take 1 tab po daily.    pravastatin (PRAVACHOL) 40 MG tablet Take 1 tab po Q hs.    UNABLE TO FIND 1 capsule, Daily    UNABLE TO FIND 850 mg, Daily    VITAMIN A ORAL 2,400 mcg, Daily    vitamin E 400 Units, Daily       Review of patient's allergies indicates:   Allergen Reactions    Codeine Other (See Comments)    Gabapentin     Hydroxychloroquine      Other Reaction(s): Loose bowel movement    Methocarbamol      Other Reaction(s): makes her sleepy and feel "funny"    Acetaminophen-codeine Other (See Comments)    Tramadol Rash        Patient Care Team:  Hanna Gupta MD as PCP - General (Family Medicine)  Shikha Ramirez FNP as Nurse Practitioner (Nephrology)  Mariana Whelan MD (Rheumatology)  Aries Mcpherson MD as Consulting Physician (Ophthalmology)     Subjective:     Review of Systems    12 point review of systems conducted, negative except as stated in the history of present illness. See HPI for details.    Objective:     Visit Vitals  /79 (Patient Position: Sitting)   Pulse 90   Temp 98.2 " °F (36.8 °C) (Oral)   Resp 15   Wt 115 kg (253 lb 9.6 oz)   SpO2 96%   BMI 44.92 kg/m²       Physical Exam  Vitals and nursing note reviewed.   Constitutional:       General: She is not in acute distress.     Appearance: She is not ill-appearing.   HENT:      Head: Normocephalic and atraumatic.   Eyes:      General: No scleral icterus.     Extraocular Movements: Extraocular movements intact.      Conjunctiva/sclera: Conjunctivae normal.   Cardiovascular:      Rate and Rhythm: Normal rate and regular rhythm.      Heart sounds: No murmur heard.     No friction rub. No gallop.   Pulmonary:      Effort: Pulmonary effort is normal. No respiratory distress.      Breath sounds: Normal breath sounds. No wheezing, rhonchi or rales.   Musculoskeletal:         General: Normal range of motion.      Right lower leg: No edema.      Left lower leg: No edema.      Comments: Triggering of right index finger with nodule palpated on tendon sheath.   Skin:     General: Skin is warm and dry.   Neurological:      General: No focal deficit present.      Mental Status: She is alert.   Psychiatric:         Mood and Affect: Mood normal.         Labs Reviewed:     Chemistry:  Lab Results   Component Value Date     07/29/2025    K 4.0 07/29/2025    BUN 29.6 (H) 07/29/2025    CREATININE 1.29 (H) 07/29/2025    EGFRNORACEVR 43 07/29/2025    CALCIUM 9.9 07/29/2025    ALKPHOS 62 07/29/2025    ALBUMIN 3.4 07/29/2025    BILIDIR 0.3 02/21/2023    IBILI 0.40 02/21/2023    AST 15 07/29/2025    ALT 20 07/29/2025    YVDADHNT07BY 54.5 02/29/2024    TSH 2.420 11/08/2024    PZSCAL3ASAF 0.78 02/29/2024        Lab Results   Component Value Date    HGBA1C 7.3 (H) 07/29/2025        Hematology:  Lab Results   Component Value Date    WBC 6.34 07/29/2025    HGB 13.1 07/29/2025    HCT 42.7 07/29/2025     07/29/2025       Lipid Panel:  Lab Results   Component Value Date    CHOL 170 07/29/2025    HDL 41 07/29/2025    TRIG 152 (H) 07/29/2025     TOTALCHOLEST 4 07/29/2025        Urine:  Lab Results   Component Value Date    APPEARANCEUA Clear 05/14/2025    SGUA 1.020 05/14/2025    PROTEINUA Negative 05/14/2025    KETONESUA Negative 05/14/2025    LEUKOCYTESUR Negative 05/14/2025    RBCUA None Seen 10/18/2024    WBCUA None Seen 10/18/2024    BACTERIA Rare 10/18/2024    SQEPUA Occ (A) 04/22/2024    HYALINECASTS None Seen 04/22/2024    CREATRANDUR 122.7 (H) 05/14/2025    PROTEINURINE 12.4 05/14/2025    UPROTCREA 0.1 05/14/2025        Assessment:       ICD-10-CM ICD-9-CM   1. Primary hypertension  I10 401.9   2. Type 2 diabetes mellitus with stage 3b chronic kidney disease, without long-term current use of insulin  E11.22 250.40    N18.32 585.3   3. Mixed hyperlipidemia  E78.2 272.2   4. Bilateral thoracic back pain, unspecified chronicity  M54.6 724.1   5. Trigger finger, unspecified finger, unspecified laterality  M65.30 727.03        Plan:     1. Primary hypertension  Assessment & Plan:  Blood pressure well-controlled.  Hypertension Medications               hydroCHLOROthiazide 25 MG Tab Take 1 tablet by mouth once daily.          AHA/ACC goal <130/80. JNC8 goal <140/90 (all ages if DM or CKD OR <60), <150/90 (>60 w/o CKD or DM)  Low Sodium Diet (DASH Diet - Less than 2 grams of sodium per day).  Monitor blood pressure daily and log. Report consistent numbers greater than 140/90.  Maintain healthy weight with goal BMI <30. Exercise 30 minutes per day, 5 days per week.  Smoking cessation encouraged to aid in BP reduction.        2. Type 2 diabetes mellitus with stage 3b chronic kidney disease, without long-term current use of insulin  Overview:  Farxiga 10 mg daily  Glipizide 5 mg daily  Actos 15 mg daily  Ozempic 1 mg daily    Assessment & Plan:  A1c 7.3, at goal for age and comorbidities.  Continue current regimen.  Lab Results   Component Value Date    HGBA1C 7.3 (H) 07/29/2025    HGBA1C 11.0 (H) 04/23/2025    CREATININE 1.29 (H) 07/29/2025      Diabetes  Medications              dapagliflozin propanediol (FARXIGA) 10 mg tablet Take 1 tablet (10 mg total) by mouth once daily.    glipiZIDE (GLUCOTROL) 5 MG tablet Take 1 tab po daily.    pioglitazone (ACTOS) 15 MG tablet Take 1 tab po daily.    semaglutide (OZEMPIC) 1 mg/dose (4 mg/3 mL) Inject 1 mg into the skin every 7 days.          On Statin according to guidelines.   Follow ADA Diet. Avoid soda, sweets, and limit carbs (no more than 45-50 grams per meal).  Maintain healthy weight with goal BMI <30.  Exercise 5 times per week for 30 minutes per day.  Stressed importance of daily foot exams.            Orders:  -     semaglutide (OZEMPIC) 2 mg/dose (8 mg/3 mL) PnIj; Inject 2 mg into the skin every 7 days.  Dispense: 3 mL; Refill: 11    3. Mixed hyperlipidemia  Overview:  Pravastatin 80 mg daily  Zetia 10mg daily.     Assessment & Plan:  Near goal of LDL<70 with increase in pravastatin. Patient encouraged to work on diet. Continue above regimen.     Lab Results   Component Value Date    TRIG 152 (H) 07/29/2025    HDL 41 07/29/2025    TOTALCHOLEST 4 07/29/2025    LDLCALC 99.00 07/29/2025     Educated on importance of dietary modifications. Follow a low cholesterol, low saturated fat diet with less that 200mg of cholesterol a day.  Avoid fried foods and high saturated fats (high saturated fats less than 7% of calories).  Increase dietary fiber.  Regular exercise can reduce LDL and raise HDL. Stressed importance of physical activity 5 times per week for 30 minutes per day.         4. Bilateral thoracic back pain, unspecified chronicity  Assessment & Plan:  Patient with radicular pain radiating from her thoracic spine around to bilateral flank for the last month.  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 Trial of lidocaine patches    Orders:  -     LIDOcaine (LIDODERM) 5 %; Place 1 patch onto the skin once daily. Remove & Discard patch within 12 hours or as directed by MD  Dispense: 30 patch; Refill: 0    5.  Trigger finger, unspecified finger, unspecified laterality  -     triamcinolone acetonide injection 40 mg  -     LIDOcaine HCL 10 mg/ml (1%) injection 4 mL  -     Tendon Sheath         Follow up in about 3 months (around 11/4/2025). In addition to their scheduled follow up, the patient has also been instructed to follow up on as needed basis.     Future Appointments   Date Time Provider Department Center   9/10/2025  9:45 AM Genie Ariza PA-C [facility]   11/26/2025 10:00 AM Shikha Ramirez FNP [facility] Gupta MD

## 2025-08-04 NOTE — ASSESSMENT & PLAN NOTE
Blood pressure well-controlled.  Hypertension Medications               hydroCHLOROthiazide 25 MG Tab Take 1 tablet by mouth once daily.          AHA/ACC goal <130/80. JNC8 goal <140/90 (all ages if DM or CKD OR <60), <150/90 (>60 w/o CKD or DM)  Low Sodium Diet (DASH Diet - Less than 2 grams of sodium per day).  Monitor blood pressure daily and log. Report consistent numbers greater than 140/90.  Maintain healthy weight with goal BMI <30. Exercise 30 minutes per day, 5 days per week.  Smoking cessation encouraged to aid in BP reduction.

## 2025-08-04 NOTE — ASSESSMENT & PLAN NOTE
A1c 7.3, at goal for age and comorbidities.  Continue current regimen.  Lab Results   Component Value Date    HGBA1C 7.3 (H) 07/29/2025    HGBA1C 11.0 (H) 04/23/2025    CREATININE 1.29 (H) 07/29/2025      Diabetes Medications              dapagliflozin propanediol (FARXIGA) 10 mg tablet Take 1 tablet (10 mg total) by mouth once daily.    glipiZIDE (GLUCOTROL) 5 MG tablet Take 1 tab po daily.    pioglitazone (ACTOS) 15 MG tablet Take 1 tab po daily.    semaglutide (OZEMPIC) 1 mg/dose (4 mg/3 mL) Inject 1 mg into the skin every 7 days.          On Statin according to guidelines.   Follow ADA Diet. Avoid soda, sweets, and limit carbs (no more than 45-50 grams per meal).  Maintain healthy weight with goal BMI <30.  Exercise 5 times per week for 30 minutes per day.  Stressed importance of daily foot exams.

## 2025-08-04 NOTE — PROCEDURES
Tendon Sheath    Date/Time: 8/4/2025 1:20 PM    Performed by: Hanna Gupta MD  Authorized by: Hanna Gupta MD    Consent Done?:  Yes (Written)  Indications:  Pain (trigger finger)  Prep: patient was prepped and draped in usual sterile fashion      Location:  Index finger  Site:  R index flexor tendon sheath  Ultrasonic guidance for needle placement?: No    Needle size:  27 G  Medication group details: 0.5cc lidocaine 2%, 0.5cc Kenolog 40mg/mL.  Patient tolerance:  Patient tolerated the procedure well with no immediate complications

## 2025-08-04 NOTE — ASSESSMENT & PLAN NOTE
Patient with radicular pain radiating from her thoracic spine around to bilateral flank for the last month.  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 Trial of lidocaine patches

## 2025-08-05 ENCOUNTER — TELEPHONE (OUTPATIENT)
Dept: FAMILY MEDICINE | Facility: CLINIC | Age: 75
End: 2025-08-05
Payer: MEDICARE

## 2025-08-05 NOTE — TELEPHONE ENCOUNTER
Patient came into the office today to provide the name and number to where she would like the order for her scooter.    Rob  Qywgx-[phone]  Fax- [phone]

## 2025-08-19 ENCOUNTER — TELEPHONE (OUTPATIENT)
Dept: FAMILY MEDICINE | Facility: CLINIC | Age: 75
End: 2025-08-19
Payer: MEDICARE

## 2025-08-19 DIAGNOSIS — Z12.31 BREAST CANCER SCREENING BY MAMMOGRAM: Primary | ICD-10-CM

## 2025-08-20 ENCOUNTER — OFFICE VISIT (OUTPATIENT)
Dept: ORTHOPEDICS | Facility: CLINIC | Age: 75
End: 2025-08-20
Payer: MEDICARE

## 2025-08-20 DIAGNOSIS — M17.0 PRIMARY OSTEOARTHRITIS OF BOTH KNEES: Primary | ICD-10-CM

## 2025-08-20 RX ORDER — LIDOCAINE HYDROCHLORIDE 20 MG/ML
2 INJECTION, SOLUTION INFILTRATION; PERINEURAL
Status: DISCONTINUED | OUTPATIENT
Start: 2025-08-20 | End: 2025-08-20 | Stop reason: HOSPADM

## 2025-08-20 RX ORDER — METHYLPREDNISOLONE ACETATE 80 MG/ML
80 INJECTION, SUSPENSION INTRA-ARTICULAR; INTRALESIONAL; INTRAMUSCULAR; SOFT TISSUE
Status: DISCONTINUED | OUTPATIENT
Start: 2025-08-20 | End: 2025-08-20 | Stop reason: HOSPADM

## 2025-08-20 RX ADMIN — LIDOCAINE HYDROCHLORIDE 2 MG: 20 INJECTION, SOLUTION INFILTRATION; PERINEURAL at 02:08

## 2025-08-20 RX ADMIN — METHYLPREDNISOLONE ACETATE 80 MG: 80 INJECTION, SUSPENSION INTRA-ARTICULAR; INTRALESIONAL; INTRAMUSCULAR; SOFT TISSUE at 02:08

## 2025-08-25 ENCOUNTER — TELEPHONE (OUTPATIENT)
Dept: FAMILY MEDICINE | Facility: CLINIC | Age: 75
End: 2025-08-25
Payer: MEDICARE

## 2025-08-25 DIAGNOSIS — E11.22 TYPE 2 DIABETES MELLITUS WITH STAGE 3B CHRONIC KIDNEY DISEASE, WITHOUT LONG-TERM CURRENT USE OF INSULIN: Primary | ICD-10-CM

## 2025-08-25 DIAGNOSIS — N18.32 TYPE 2 DIABETES MELLITUS WITH STAGE 3B CHRONIC KIDNEY DISEASE, WITHOUT LONG-TERM CURRENT USE OF INSULIN: Primary | ICD-10-CM

## 2025-08-26 PROBLEM — Z74.09 POOR MOBILITY: Status: ACTIVE | Noted: 2025-08-26

## 2025-08-26 RX ORDER — INSULIN PUMP SYRINGE, 3 ML
EACH MISCELLANEOUS
Qty: 1 EACH | Refills: 0 | Status: SHIPPED | OUTPATIENT
Start: 2025-08-26 | End: 2025-08-29 | Stop reason: SDUPTHER

## 2025-08-26 RX ORDER — LANCETS
EACH MISCELLANEOUS
Qty: 100 EACH | Refills: 11 | Status: SHIPPED | OUTPATIENT
Start: 2025-08-26 | End: 2025-08-29 | Stop reason: SDUPTHER

## 2025-08-26 RX ORDER — INSULIN PUMP SYRINGE, 3 ML
1 EACH MISCELLANEOUS
COMMUNITY
End: 2025-08-26 | Stop reason: SDUPTHER

## 2025-08-26 RX ORDER — INSULIN PUMP SYRINGE, 3 ML
1 EACH MISCELLANEOUS
Qty: 1 EACH | Refills: 0 | Status: SHIPPED | OUTPATIENT
Start: 2025-08-26

## 2025-08-29 ENCOUNTER — OFFICE VISIT (OUTPATIENT)
Dept: FAMILY MEDICINE | Facility: CLINIC | Age: 75
End: 2025-08-29
Payer: MEDICARE

## 2025-08-29 VITALS
RESPIRATION RATE: 15 BRPM | SYSTOLIC BLOOD PRESSURE: 123 MMHG | WEIGHT: 249 LBS | OXYGEN SATURATION: 98 % | HEART RATE: 87 BPM | BODY MASS INDEX: 44.11 KG/M2 | DIASTOLIC BLOOD PRESSURE: 74 MMHG | TEMPERATURE: 98 F

## 2025-08-29 DIAGNOSIS — B00.89 HERPES SIMPLEX VIRUS (HSV) INFECTION OF BUTTOCK: Primary | ICD-10-CM

## 2025-08-29 RX ORDER — LANCETS 28 GAUGE
EACH MISCELLANEOUS
COMMUNITY
Start: 2025-08-26

## 2025-08-29 RX ORDER — VALACYCLOVIR HYDROCHLORIDE 1 G/1
1000 TABLET, FILM COATED ORAL 2 TIMES DAILY
Qty: 14 TABLET | Refills: 2 | Status: SHIPPED | OUTPATIENT
Start: 2025-08-29 | End: 2025-09-05

## 2025-09-04 ENCOUNTER — TELEPHONE (OUTPATIENT)
Dept: FAMILY MEDICINE | Facility: CLINIC | Age: 75
End: 2025-09-04
Payer: MEDICARE